# Patient Record
Sex: FEMALE | Race: WHITE | NOT HISPANIC OR LATINO | Employment: UNEMPLOYED | URBAN - METROPOLITAN AREA
[De-identification: names, ages, dates, MRNs, and addresses within clinical notes are randomized per-mention and may not be internally consistent; named-entity substitution may affect disease eponyms.]

---

## 2017-02-17 ENCOUNTER — HISTORICAL (OUTPATIENT)
Dept: RADIOLOGY | Facility: HOSPITAL | Age: 57
End: 2017-02-17

## 2017-02-27 ENCOUNTER — HISTORICAL (OUTPATIENT)
Dept: ADMINISTRATIVE | Facility: HOSPITAL | Age: 57
End: 2017-02-27

## 2017-03-31 ENCOUNTER — HISTORICAL (OUTPATIENT)
Dept: RADIOLOGY | Facility: HOSPITAL | Age: 57
End: 2017-03-31

## 2017-07-26 ENCOUNTER — HISTORICAL (OUTPATIENT)
Dept: ADMINISTRATIVE | Facility: HOSPITAL | Age: 57
End: 2017-07-26

## 2017-07-26 LAB
ABS NEUT (OLG): 2.44 X10(3)/MCL (ref 2.1–9.2)
ALBUMIN SERPL-MCNC: 3.7 GM/DL (ref 3.4–5)
ALBUMIN/GLOB SERPL: 1.3 {RATIO}
ALP SERPL-CCNC: 120 UNIT/L (ref 38–126)
ALT SERPL-CCNC: 26 UNIT/L (ref 12–78)
AST SERPL-CCNC: 18 UNIT/L (ref 15–37)
BASOPHILS # BLD AUTO: 0 X10(3)/MCL (ref 0–0.2)
BASOPHILS NFR BLD AUTO: 1 %
BILIRUB SERPL-MCNC: 0.6 MG/DL (ref 0.2–1)
BILIRUBIN DIRECT+TOT PNL SERPL-MCNC: 0.1 MG/DL (ref 0–0.2)
BILIRUBIN DIRECT+TOT PNL SERPL-MCNC: 0.5 MG/DL (ref 0–0.8)
BUN SERPL-MCNC: 21 MG/DL (ref 7–18)
CALCIUM SERPL-MCNC: 9.2 MG/DL (ref 8.5–10.1)
CHLORIDE SERPL-SCNC: 101 MMOL/L (ref 98–107)
CO2 SERPL-SCNC: 31 MMOL/L (ref 21–32)
CREAT SERPL-MCNC: 1.01 MG/DL (ref 0.55–1.02)
EOSINOPHIL # BLD AUTO: 0.4 X10(3)/MCL (ref 0–0.9)
EOSINOPHIL NFR BLD AUTO: 9 %
ERYTHROCYTE [DISTWIDTH] IN BLOOD BY AUTOMATED COUNT: 12.7 % (ref 11.5–17)
EST. AVERAGE GLUCOSE BLD GHB EST-MCNC: 154 MG/DL
GLOBULIN SER-MCNC: 2.9 GM/DL (ref 2.4–3.5)
GLUCOSE SERPL-MCNC: 107 MG/DL (ref 74–106)
HBA1C MFR BLD: 7 % (ref 4.2–6.3)
HCT VFR BLD AUTO: 40.3 % (ref 37–47)
HGB BLD-MCNC: 13.2 GM/DL (ref 12–16)
IRON SATN MFR SERPL: 19.3 % (ref 20–50)
IRON SERPL-MCNC: 75 MCG/DL (ref 50–175)
LYMPHOCYTES # BLD AUTO: 1.4 X10(3)/MCL (ref 0.6–4.6)
LYMPHOCYTES NFR BLD AUTO: 28 %
MCH RBC QN AUTO: 29.9 PG (ref 27–31)
MCHC RBC AUTO-ENTMCNC: 32.8 GM/DL (ref 33–36)
MCV RBC AUTO: 91.4 FL (ref 80–94)
MONOCYTES # BLD AUTO: 0.5 X10(3)/MCL (ref 0.1–1.3)
MONOCYTES NFR BLD AUTO: 10 %
NEUTROPHILS # BLD AUTO: 2.44 X10(3)/MCL (ref 2.1–9.2)
NEUTROPHILS NFR BLD AUTO: 51 %
PLATELET # BLD AUTO: 226 X10(3)/MCL (ref 130–400)
PMV BLD AUTO: 9.4 FL (ref 9.4–12.4)
POTASSIUM SERPL-SCNC: 3.4 MMOL/L (ref 3.5–5.1)
PROT SERPL-MCNC: 6.6 GM/DL (ref 6.4–8.2)
RBC # BLD AUTO: 4.41 X10(6)/MCL (ref 4.2–5.4)
SODIUM SERPL-SCNC: 140 MMOL/L (ref 136–145)
T3FREE SERPL-MCNC: 3.18 PG/ML (ref 2.18–3.98)
T4 FREE SERPL-MCNC: 0.94 NG/DL (ref 0.76–1.46)
TIBC SERPL-MCNC: 388 MCG/DL (ref 250–450)
TRANSFERRIN SERPL-MCNC: 323 MG/DL (ref 200–360)
TSH SERPL-ACNC: 0.71 MIU/ML (ref 0.36–3.74)
WBC # SPEC AUTO: 4.8 X10(3)/MCL (ref 4.5–11.5)

## 2017-07-27 ENCOUNTER — HISTORICAL (OUTPATIENT)
Dept: ADMINISTRATIVE | Facility: HOSPITAL | Age: 57
End: 2017-07-27

## 2017-10-17 ENCOUNTER — HISTORICAL (OUTPATIENT)
Dept: LAB | Facility: HOSPITAL | Age: 57
End: 2017-10-17

## 2017-12-14 ENCOUNTER — HISTORICAL (OUTPATIENT)
Dept: LAB | Facility: HOSPITAL | Age: 57
End: 2017-12-14

## 2017-12-14 LAB
ABS NEUT (OLG): 3.61 X10(3)/MCL (ref 2.1–9.2)
ALBUMIN SERPL-MCNC: 3.6 GM/DL (ref 3.4–5)
ALBUMIN/GLOB SERPL: 1.1 RATIO (ref 1.1–2)
ALP SERPL-CCNC: 119 UNIT/L (ref 38–126)
ALT SERPL-CCNC: 38 UNIT/L (ref 12–78)
APPEARANCE, UA: CLEAR
AST SERPL-CCNC: 35 UNIT/L (ref 15–37)
BACTERIA SPEC CULT: ABNORMAL /HPF
BASOPHILS # BLD AUTO: 0 X10(3)/MCL (ref 0–0.2)
BASOPHILS NFR BLD AUTO: 1 %
BILIRUB SERPL-MCNC: 0.5 MG/DL (ref 0.2–1)
BILIRUB UR QL STRIP: NEGATIVE
BILIRUBIN DIRECT+TOT PNL SERPL-MCNC: 0.1 MG/DL (ref 0–0.5)
BILIRUBIN DIRECT+TOT PNL SERPL-MCNC: 0.4 MG/DL (ref 0–0.8)
BUN SERPL-MCNC: 20 MG/DL (ref 7–18)
CALCIUM SERPL-MCNC: 8.7 MG/DL (ref 8.5–10.1)
CHLORIDE SERPL-SCNC: 102 MMOL/L (ref 98–107)
CO2 SERPL-SCNC: 31 MMOL/L (ref 21–32)
COLOR UR: YELLOW
CREAT SERPL-MCNC: 1 MG/DL (ref 0.55–1.02)
EOSINOPHIL # BLD AUTO: 0.6 X10(3)/MCL (ref 0–0.9)
EOSINOPHIL NFR BLD AUTO: 10 %
ERYTHROCYTE [DISTWIDTH] IN BLOOD BY AUTOMATED COUNT: 12.4 % (ref 11.5–17)
EST. AVERAGE GLUCOSE BLD GHB EST-MCNC: 157 MG/DL
GLOBULIN SER-MCNC: 3.3 GM/DL (ref 2.4–3.5)
GLUCOSE (UA): ABNORMAL
GLUCOSE SERPL-MCNC: 259 MG/DL (ref 74–106)
HBA1C MFR BLD: 7.1 % (ref 4.2–6.3)
HCT VFR BLD AUTO: 41.6 % (ref 37–47)
HGB BLD-MCNC: 13.5 GM/DL (ref 12–16)
HGB UR QL STRIP: NEGATIVE
KETONES UR QL STRIP: NEGATIVE
LEUKOCYTE ESTERASE UR QL STRIP: NEGATIVE
LYMPHOCYTES # BLD AUTO: 1.4 X10(3)/MCL (ref 0.6–4.6)
LYMPHOCYTES NFR BLD AUTO: 23 %
MCH RBC QN AUTO: 29.8 PG (ref 27–31)
MCHC RBC AUTO-ENTMCNC: 32.5 GM/DL (ref 33–36)
MCV RBC AUTO: 91.8 FL (ref 80–94)
MONOCYTES # BLD AUTO: 0.4 X10(3)/MCL (ref 0.1–1.3)
MONOCYTES NFR BLD AUTO: 6 %
NEUTROPHILS # BLD AUTO: 3.61 X10(3)/MCL (ref 1.4–7.9)
NEUTROPHILS NFR BLD AUTO: 60 %
NITRITE UR QL STRIP: NEGATIVE
PH UR STRIP: 6 [PH] (ref 5–9)
PLATELET # BLD AUTO: 233 X10(3)/MCL (ref 130–400)
PMV BLD AUTO: 9.7 FL (ref 9.4–12.4)
POTASSIUM SERPL-SCNC: 4 MMOL/L (ref 3.5–5.1)
PROT SERPL-MCNC: 6.9 GM/DL (ref 6.4–8.2)
PROT UR QL STRIP: NEGATIVE
RBC # BLD AUTO: 4.53 X10(6)/MCL (ref 4.2–5.4)
RBC #/AREA URNS HPF: ABNORMAL /[HPF]
SODIUM SERPL-SCNC: 139 MMOL/L (ref 136–145)
SP GR UR STRIP: 1.02 (ref 1–1.03)
SQUAMOUS EPITHELIAL, UA: ABNORMAL
TSH SERPL-ACNC: 0.4 MIU/ML (ref 0.36–3.74)
UROBILINOGEN UR STRIP-ACNC: 0.2
WBC # SPEC AUTO: 6 X10(3)/MCL (ref 4.5–11.5)
WBC #/AREA URNS HPF: ABNORMAL /[HPF]

## 2017-12-15 ENCOUNTER — HISTORICAL (OUTPATIENT)
Dept: LAB | Facility: HOSPITAL | Age: 57
End: 2017-12-15

## 2018-06-18 ENCOUNTER — HISTORICAL (OUTPATIENT)
Dept: ADMINISTRATIVE | Facility: HOSPITAL | Age: 58
End: 2018-06-18

## 2018-06-18 LAB
ABS NEUT (OLG): 2.72 X10(3)/MCL (ref 2.1–9.2)
ALBUMIN SERPL-MCNC: 3.4 GM/DL (ref 3.4–5)
ALBUMIN/GLOB SERPL: 1.2 {RATIO}
ALP SERPL-CCNC: 111 UNIT/L (ref 38–126)
ALT SERPL-CCNC: 24 UNIT/L (ref 12–78)
AST SERPL-CCNC: 15 UNIT/L (ref 15–37)
BASOPHILS # BLD AUTO: 0 X10(3)/MCL (ref 0–0.2)
BASOPHILS NFR BLD AUTO: 1 %
BILIRUB SERPL-MCNC: 0.5 MG/DL (ref 0.2–1)
BILIRUBIN DIRECT+TOT PNL SERPL-MCNC: 0.1 MG/DL (ref 0–0.2)
BILIRUBIN DIRECT+TOT PNL SERPL-MCNC: 0.4 MG/DL (ref 0–0.8)
BUN SERPL-MCNC: 12 MG/DL (ref 7–18)
CALCIUM SERPL-MCNC: 9 MG/DL (ref 8.5–10.1)
CHLORIDE SERPL-SCNC: 102 MMOL/L (ref 98–107)
CHOLEST SERPL-MCNC: 197 MG/DL (ref 0–200)
CHOLEST/HDLC SERPL: 3.4 {RATIO} (ref 0–4)
CO2 SERPL-SCNC: 31 MMOL/L (ref 21–32)
CREAT SERPL-MCNC: 0.89 MG/DL (ref 0.55–1.02)
CREAT UR-MCNC: 349 MG/DL
ERYTHROCYTE [DISTWIDTH] IN BLOOD BY AUTOMATED COUNT: 12.4 % (ref 11.5–17)
EST. AVERAGE GLUCOSE BLD GHB EST-MCNC: 134 MG/DL
GLOBULIN SER-MCNC: 2.9 GM/DL (ref 2.4–3.5)
GLUCOSE SERPL-MCNC: 210 MG/DL (ref 74–106)
HAV IGM SERPL QL IA: NEGATIVE
HBA1C MFR BLD: 6.3 % (ref 4.2–6.3)
HBV CORE IGM SERPL QL IA: NEGATIVE
HBV SURFACE AG SERPL QL IA: NEGATIVE
HCT VFR BLD AUTO: 40.2 % (ref 37–47)
HCV AB SERPL QL IA: NEGATIVE
HDLC SERPL-MCNC: 58 MG/DL (ref 35–60)
HEPATITIS PANEL INTERP: NORMAL
HGB BLD-MCNC: 13.5 GM/DL (ref 12–16)
LDLC SERPL CALC-MCNC: 112 MG/DL (ref 0–129)
LYMPHOCYTES # BLD AUTO: 2.1 X10(3)/MCL (ref 0.6–4.6)
LYMPHOCYTES NFR BLD AUTO: 39 %
MCH RBC QN AUTO: 31 PG (ref 27–31)
MCHC RBC AUTO-ENTMCNC: 33.6 GM/DL (ref 33–36)
MCV RBC AUTO: 92.4 FL (ref 80–94)
MICROALBUMIN UR-MCNC: 1.6 MG/DL
MICROALBUMIN/CREAT RATIO PNL UR: 4.6 MG/GM CR (ref 0–30)
MONOCYTES # BLD AUTO: 0.5 X10(3)/MCL (ref 0.1–1.3)
MONOCYTES NFR BLD AUTO: 9 %
NEUTROPHILS # BLD AUTO: 2.72 X10(3)/MCL (ref 1.4–7.9)
NEUTROPHILS NFR BLD AUTO: 51 %
PLATELET # BLD AUTO: 214 X10(3)/MCL (ref 130–400)
PMV BLD AUTO: 9.5 FL (ref 9.4–12.4)
POTASSIUM SERPL-SCNC: 4.1 MMOL/L (ref 3.5–5.1)
PROT SERPL-MCNC: 6.3 GM/DL (ref 6.4–8.2)
RBC # BLD AUTO: 4.35 X10(6)/MCL (ref 4.2–5.4)
SODIUM SERPL-SCNC: 142 MMOL/L (ref 136–145)
TRIGL SERPL-MCNC: 137 MG/DL (ref 30–150)
VLDLC SERPL CALC-MCNC: 27 MG/DL
WBC # SPEC AUTO: 5.3 X10(3)/MCL (ref 4.5–11.5)

## 2018-06-25 ENCOUNTER — HISTORICAL (OUTPATIENT)
Dept: ADMINISTRATIVE | Facility: HOSPITAL | Age: 58
End: 2018-06-25

## 2018-08-02 ENCOUNTER — HISTORICAL (OUTPATIENT)
Dept: ADMINISTRATIVE | Facility: HOSPITAL | Age: 58
End: 2018-08-02

## 2018-08-02 LAB
T4 FREE SERPL-MCNC: 0.85 NG/DL (ref 0.76–1.46)
TSH SERPL-ACNC: 0.64 MIU/L (ref 0.36–3.74)

## 2018-12-21 LAB — CRC RECOMMENDATION EXT: NORMAL

## 2019-01-21 LAB — BCS RECOMMENDATION EXT: NORMAL

## 2019-02-25 ENCOUNTER — HISTORICAL (OUTPATIENT)
Dept: ADMINISTRATIVE | Facility: HOSPITAL | Age: 59
End: 2019-02-25

## 2019-02-25 LAB
ALBUMIN SERPL-MCNC: 3.2 GM/DL (ref 3.4–5)
ALBUMIN/GLOB SERPL: 1 {RATIO}
ALP SERPL-CCNC: 128 UNIT/L (ref 38–126)
ALT SERPL-CCNC: 36 UNIT/L (ref 12–78)
AST SERPL-CCNC: 21 UNIT/L (ref 15–37)
BILIRUB SERPL-MCNC: 0.2 MG/DL (ref 0.2–1)
BILIRUBIN DIRECT+TOT PNL SERPL-MCNC: 0.1 MG/DL (ref 0–0.2)
BILIRUBIN DIRECT+TOT PNL SERPL-MCNC: 0.1 MG/DL (ref 0–0.8)
BUN SERPL-MCNC: 12 MG/DL (ref 7–18)
CALCIUM SERPL-MCNC: 8.9 MG/DL (ref 8.5–10.1)
CHLORIDE SERPL-SCNC: 100 MMOL/L (ref 98–107)
CO2 SERPL-SCNC: 31 MMOL/L (ref 21–32)
CREAT SERPL-MCNC: 0.99 MG/DL (ref 0.55–1.02)
CREAT UR-MCNC: 235 MG/DL
EST. AVERAGE GLUCOSE BLD GHB EST-MCNC: 169 MG/DL
GLOBULIN SER-MCNC: 3.2 GM/DL (ref 2.4–3.5)
GLUCOSE SERPL-MCNC: 284 MG/DL (ref 74–106)
HBA1C MFR BLD: 7.5 % (ref 4.2–6.3)
MICROALBUMIN UR-MCNC: 1.7 MG/DL
MICROALBUMIN/CREAT RATIO PNL UR: 7.2 MG/GM CR (ref 0–30)
POTASSIUM SERPL-SCNC: 4.7 MMOL/L (ref 3.5–5.1)
PROT SERPL-MCNC: 6.4 GM/DL (ref 6.4–8.2)
SODIUM SERPL-SCNC: 137 MMOL/L (ref 136–145)

## 2019-03-07 ENCOUNTER — HISTORICAL (OUTPATIENT)
Dept: ANESTHESIOLOGY | Facility: HOSPITAL | Age: 59
End: 2019-03-07

## 2019-05-15 ENCOUNTER — HISTORICAL (OUTPATIENT)
Dept: CARDIOLOGY | Facility: HOSPITAL | Age: 59
End: 2019-05-15

## 2019-07-02 ENCOUNTER — HISTORICAL (OUTPATIENT)
Dept: ADMINISTRATIVE | Facility: HOSPITAL | Age: 59
End: 2019-07-02

## 2019-07-02 LAB
BUN SERPL-MCNC: 17 MG/DL (ref 7–18)
CALCIUM SERPL-MCNC: 8.6 MG/DL (ref 8.5–10.1)
CHLORIDE SERPL-SCNC: 101 MMOL/L (ref 98–107)
CO2 SERPL-SCNC: 30 MMOL/L (ref 21–32)
CREAT SERPL-MCNC: 1.07 MG/DL (ref 0.55–1.02)
CREAT/UREA NIT SERPL: 15.9
GLUCOSE SERPL-MCNC: 322 MG/DL (ref 74–106)
POTASSIUM SERPL-SCNC: 4.8 MMOL/L (ref 3.5–5.1)
SODIUM SERPL-SCNC: 138 MMOL/L (ref 136–145)

## 2019-09-12 ENCOUNTER — HISTORICAL (OUTPATIENT)
Dept: LAB | Facility: HOSPITAL | Age: 59
End: 2019-09-12

## 2019-09-12 LAB
EST. AVERAGE GLUCOSE BLD GHB EST-MCNC: 151 MG/DL
HBA1C MFR BLD: 6.9 % (ref 4.2–6.3)

## 2020-01-15 ENCOUNTER — HISTORICAL (OUTPATIENT)
Dept: ADMINISTRATIVE | Facility: HOSPITAL | Age: 60
End: 2020-01-15

## 2020-01-15 LAB
ABS NEUT (OLG): 3.2 X10(3)/MCL (ref 2.1–9.2)
ALBUMIN SERPL-MCNC: 3.8 GM/DL (ref 3.4–5)
ALBUMIN/GLOB SERPL: 1.2 {RATIO}
ALP SERPL-CCNC: 122 UNIT/L (ref 38–126)
ALT SERPL-CCNC: 29 UNIT/L (ref 12–78)
AST SERPL-CCNC: 19 UNIT/L (ref 15–37)
BASOPHILS # BLD AUTO: 0.1 X10(3)/MCL (ref 0–0.2)
BASOPHILS NFR BLD AUTO: 1 %
BILIRUB SERPL-MCNC: 0.6 MG/DL (ref 0.2–1)
BILIRUBIN DIRECT+TOT PNL SERPL-MCNC: 0.1 MG/DL (ref 0–0.2)
BILIRUBIN DIRECT+TOT PNL SERPL-MCNC: 0.5 MG/DL (ref 0–0.8)
BUN SERPL-MCNC: 17 MG/DL (ref 7–18)
CALCIUM SERPL-MCNC: 9.5 MG/DL (ref 8.5–10.1)
CHLORIDE SERPL-SCNC: 103 MMOL/L (ref 98–107)
CHOLEST SERPL-MCNC: 195 MG/DL (ref 0–200)
CHOLEST/HDLC SERPL: 3.4 {RATIO} (ref 0–4)
CO2 SERPL-SCNC: 29 MMOL/L (ref 21–32)
CREAT SERPL-MCNC: 1.04 MG/DL (ref 0.55–1.02)
EOSINOPHIL # BLD AUTO: 0.3 X10(3)/MCL (ref 0–0.9)
EOSINOPHIL NFR BLD AUTO: 5 %
ERYTHROCYTE [DISTWIDTH] IN BLOOD BY AUTOMATED COUNT: 12.7 % (ref 11.5–17)
EST. AVERAGE GLUCOSE BLD GHB EST-MCNC: 148 MG/DL
GLOBULIN SER-MCNC: 3.1 GM/DL (ref 2.4–3.5)
GLUCOSE SERPL-MCNC: 91 MG/DL (ref 74–106)
HBA1C MFR BLD: 6.8 % (ref 4.2–6.3)
HCT VFR BLD AUTO: 42.6 % (ref 37–47)
HDLC SERPL-MCNC: 58 MG/DL (ref 35–60)
HGB BLD-MCNC: 13.8 GM/DL (ref 12–16)
LDLC SERPL CALC-MCNC: 114 MG/DL (ref 0–129)
LYMPHOCYTES # BLD AUTO: 2.4 X10(3)/MCL (ref 0.6–4.6)
LYMPHOCYTES NFR BLD AUTO: 36 %
MCH RBC QN AUTO: 29.9 PG (ref 27–31)
MCHC RBC AUTO-ENTMCNC: 32.4 GM/DL (ref 33–36)
MCV RBC AUTO: 92.2 FL (ref 80–94)
MONOCYTES # BLD AUTO: 0.6 X10(3)/MCL (ref 0.1–1.3)
MONOCYTES NFR BLD AUTO: 9 %
NEUTROPHILS # BLD AUTO: 3.2 X10(3)/MCL (ref 2.1–9.2)
NEUTROPHILS NFR BLD AUTO: 48 %
PLATELET # BLD AUTO: 287 X10(3)/MCL (ref 130–400)
PMV BLD AUTO: 9.1 FL (ref 9.4–12.4)
POTASSIUM SERPL-SCNC: 3.7 MMOL/L (ref 3.5–5.1)
PROT SERPL-MCNC: 6.9 GM/DL (ref 6.4–8.2)
RBC # BLD AUTO: 4.62 X10(6)/MCL (ref 4.2–5.4)
SODIUM SERPL-SCNC: 141 MMOL/L (ref 136–145)
TRIGL SERPL-MCNC: 117 MG/DL (ref 30–150)
VLDLC SERPL CALC-MCNC: 23 MG/DL
WBC # SPEC AUTO: 6.6 X10(3)/MCL (ref 4.5–11.5)

## 2020-02-03 ENCOUNTER — HISTORICAL (OUTPATIENT)
Dept: RADIOLOGY | Facility: HOSPITAL | Age: 60
End: 2020-02-03

## 2020-02-27 LAB
ALBUMIN SERPL-MCNC: 3.6 GM/DL (ref 3.4–5)
ALBUMIN/GLOB SERPL: 1.1 {RATIO}
ALP SERPL-CCNC: 104 UNIT/L (ref 38–126)
ALT SERPL-CCNC: 26 UNIT/L (ref 12–78)
AST SERPL-CCNC: 24 UNIT/L (ref 15–37)
BILIRUB SERPL-MCNC: 0.4 MG/DL (ref 0.2–1)
BILIRUBIN DIRECT+TOT PNL SERPL-MCNC: 0.1 MG/DL (ref 0–0.2)
BILIRUBIN DIRECT+TOT PNL SERPL-MCNC: 0.3 MG/DL (ref 0–0.8)
BUN SERPL-MCNC: 24 MG/DL (ref 7–18)
CALCIUM SERPL-MCNC: 9.2 MG/DL (ref 8.5–10.1)
CHLORIDE SERPL-SCNC: 103 MMOL/L (ref 98–107)
CO2 SERPL-SCNC: 32 MMOL/L (ref 21–32)
CREAT SERPL-MCNC: 1.02 MG/DL (ref 0.55–1.02)
ERYTHROCYTE [DISTWIDTH] IN BLOOD BY AUTOMATED COUNT: 12.5 % (ref 11.5–17)
GLOBULIN SER-MCNC: 3.2 GM/DL (ref 2.4–3.5)
GLUCOSE SERPL-MCNC: 133 MG/DL (ref 74–106)
HCT VFR BLD AUTO: 41 % (ref 37–47)
HGB BLD-MCNC: 13.1 GM/DL (ref 12–16)
MCH RBC QN AUTO: 29.6 PG (ref 27–31)
MCHC RBC AUTO-ENTMCNC: 32 GM/DL (ref 33–36)
MCV RBC AUTO: 92.6 FL (ref 80–94)
PLATELET # BLD AUTO: 269 X10(3)/MCL (ref 130–400)
PMV BLD AUTO: 9.7 FL (ref 9.4–12.4)
POTASSIUM SERPL-SCNC: 4.1 MMOL/L (ref 3.5–5.1)
PROT SERPL-MCNC: 6.8 GM/DL (ref 6.4–8.2)
RBC # BLD AUTO: 4.43 X10(6)/MCL (ref 4.2–5.4)
SODIUM SERPL-SCNC: 141 MMOL/L (ref 136–145)
WBC # SPEC AUTO: 5.6 X10(3)/MCL (ref 4.5–11.5)

## 2020-03-03 ENCOUNTER — HISTORICAL (OUTPATIENT)
Dept: ADMINISTRATIVE | Facility: HOSPITAL | Age: 60
End: 2020-03-03

## 2020-05-07 ENCOUNTER — HISTORICAL (OUTPATIENT)
Dept: LAB | Facility: HOSPITAL | Age: 60
End: 2020-05-07

## 2020-05-07 LAB
APPEARANCE, UA: CLEAR
BACTERIA SPEC CULT: NORMAL /HPF
BILIRUB UR QL STRIP: NEGATIVE
COLOR UR: YELLOW
GLUCOSE (UA): NEGATIVE
HGB UR QL STRIP: NEGATIVE
KETONES UR QL STRIP: NEGATIVE
LEUKOCYTE ESTERASE UR QL STRIP: NEGATIVE
NITRITE UR QL STRIP: NEGATIVE
PH UR STRIP: 5 [PH] (ref 5–9)
PROT UR QL STRIP: NEGATIVE
RBC #/AREA URNS HPF: NORMAL /[HPF]
SP GR UR STRIP: 1.01 (ref 1–1.03)
SQUAMOUS EPITHELIAL, UA: NORMAL
UROBILINOGEN UR STRIP-ACNC: 0.2
WBC #/AREA URNS HPF: NORMAL /[HPF]

## 2020-05-11 ENCOUNTER — HISTORICAL (OUTPATIENT)
Dept: ADMINISTRATIVE | Facility: HOSPITAL | Age: 60
End: 2020-05-11

## 2020-05-11 LAB
ALBUMIN SERPL-MCNC: 3.7 GM/DL (ref 3.5–5)
ALBUMIN/GLOB SERPL: 1.4 RATIO (ref 1.1–2)
ALP SERPL-CCNC: 95 UNIT/L (ref 40–150)
ALT SERPL-CCNC: 17 UNIT/L (ref 0–55)
AST SERPL-CCNC: 22 UNIT/L (ref 5–34)
BILIRUB SERPL-MCNC: 0.2 MG/DL
BILIRUBIN DIRECT+TOT PNL SERPL-MCNC: 0.1 MG/DL (ref 0–0.5)
BILIRUBIN DIRECT+TOT PNL SERPL-MCNC: 0.1 MG/DL (ref 0–0.8)
BUN SERPL-MCNC: 15.8 MG/DL (ref 9.8–20.1)
CALCIUM SERPL-MCNC: 9.2 MG/DL (ref 8.4–10.2)
CHLORIDE SERPL-SCNC: 103 MMOL/L (ref 98–107)
CO2 SERPL-SCNC: 29 MMOL/L (ref 22–29)
CREAT SERPL-MCNC: 0.93 MG/DL (ref 0.55–1.02)
ERYTHROCYTE [DISTWIDTH] IN BLOOD BY AUTOMATED COUNT: 12.3 % (ref 11.5–17)
EST. AVERAGE GLUCOSE BLD GHB EST-MCNC: 139.8 MG/DL
GLOBULIN SER-MCNC: 2.7 GM/DL (ref 2.4–3.5)
GLUCOSE SERPL-MCNC: 63 MG/DL (ref 74–100)
HBA1C MFR BLD: 6.5 %
HCT VFR BLD AUTO: 39.2 % (ref 37–47)
HGB BLD-MCNC: 12.6 GM/DL (ref 12–16)
MCH RBC QN AUTO: 30.2 PG (ref 27–31)
MCHC RBC AUTO-ENTMCNC: 32.1 GM/DL (ref 33–36)
MCV RBC AUTO: 94 FL (ref 80–94)
PLATELET # BLD AUTO: 244 X10(3)/MCL (ref 130–400)
PMV BLD AUTO: 9.3 FL (ref 9.4–12.4)
POTASSIUM SERPL-SCNC: 3.8 MMOL/L (ref 3.5–5.1)
PROT SERPL-MCNC: 6.4 GM/DL (ref 6.4–8.3)
RBC # BLD AUTO: 4.17 X10(6)/MCL (ref 4.2–5.4)
SODIUM SERPL-SCNC: 141 MMOL/L (ref 136–145)
WBC # SPEC AUTO: 5.9 X10(3)/MCL (ref 4.5–11.5)

## 2020-05-29 ENCOUNTER — HISTORICAL (OUTPATIENT)
Dept: ADMINISTRATIVE | Facility: HOSPITAL | Age: 60
End: 2020-05-29

## 2021-03-29 ENCOUNTER — HISTORICAL (OUTPATIENT)
Dept: ADMINISTRATIVE | Facility: HOSPITAL | Age: 61
End: 2021-03-29

## 2021-06-18 ENCOUNTER — HISTORICAL (OUTPATIENT)
Dept: RADIOLOGY | Facility: HOSPITAL | Age: 61
End: 2021-06-18

## 2021-08-05 ENCOUNTER — HISTORICAL (OUTPATIENT)
Dept: ADMINISTRATIVE | Facility: HOSPITAL | Age: 61
End: 2021-08-05

## 2021-08-05 LAB — SARS-COV-2 RNA RESP QL NAA+PROBE: NEGATIVE

## 2021-11-26 LAB
INFLUENZA A ANTIGEN, POC: NEGATIVE
INFLUENZA B ANTIGEN, POC: NEGATIVE
RAPID GROUP A STREP (OHS): NEGATIVE
SARS-COV-2 RNA RESP QL NAA+PROBE: NEGATIVE

## 2021-12-14 LAB — PAP RECOMMENDATION EXT: NORMAL

## 2022-01-22 LAB
INFLUENZA A ANTIGEN, POC: NEGATIVE
INFLUENZA B ANTIGEN, POC: NEGATIVE
RAPID GROUP A STREP (OHS): NEGATIVE
SARS-COV-2 RNA RESP QL NAA+PROBE: POSITIVE

## 2022-04-10 ENCOUNTER — HISTORICAL (OUTPATIENT)
Dept: ADMINISTRATIVE | Facility: HOSPITAL | Age: 62
End: 2022-04-10
Payer: COMMERCIAL

## 2022-04-27 VITALS
WEIGHT: 180.75 LBS | SYSTOLIC BLOOD PRESSURE: 110 MMHG | HEIGHT: 67 IN | BODY MASS INDEX: 28.37 KG/M2 | OXYGEN SATURATION: 98 % | DIASTOLIC BLOOD PRESSURE: 68 MMHG

## 2022-04-30 NOTE — H&P
"   Patient:   Beth Chaudhary            MRN: [id number]            FIN: [id number]               Age:   59 years     Sex:  Female     :  1960   Associated Diagnoses:   None   Author:   Yao Singletary MD      Chief Complaint     "I have a hernia"      History of Present Illness     60 yo female with history of multiple abdominal surgeries who states her hernia is continuing to worsen.  Tolerating a regular diet, with normal bowel movements.  Denies any fever / chills.        Review of Systems   Constitutional:  Negative.    Eye:  Negative.    Ear/Nose/Mouth/Throat:  Negative.    Respiratory:  Negative.    Cardiovascular:  Negative.    Gastrointestinal:  Negative.    Genitourinary:  Negative.    Hematology/Lymphatics:  Negative.    Endocrine:  Negative.    Immunologic:  Negative.    Musculoskeletal:  Negative.    Integumentary:  Negative.    Neurologic:  Negative.    Psychiatric:  Negative.    All other systems are negative      Health Status   Allergies:    Allergic Reactions (Selected)  Severity Not Documented  Codeine- No reactions were documented.  Gabapentin- No reactions were documented.  Morphine- Morphine.  Sulfa drugs- No reactions were documented.   Current medications:  (Selected)   Inpatient Medications  Ordered  Ancef: 2 gm, form: Infusion, IV Piggyback, On Call, Infuse over: 1 hr, Order duration: 1 dose(s), first dose 20 6:02:00 CST  Prescriptions  Prescribed  Altace 10 mg oral capsule: 10 mg = 1 cap(s), Oral, Daily, # 30 cap(s), 6 Refill(s), Pharmacy: [facility] [id number]  Benicar 5 mg oral tablet: 10 mg = 2 tab(s), Oral, BID, # 360 tab(s), 3 Refill(s), Pharmacy: "[email or web address] #[id number]  Bentyl 10 mg oral capsule: 10 mg = 1 cap(s), Oral, QID, # 56 cap(s), 0 Refill(s), Pharmacy: [facility] [id number]  Bentyl 10 mg oral capsule: 20 mg = 2 cap(s), Oral, QID, # 56 cap(s), 0 Refill(s), Pharmacy: [facility] [id number]  DuoNeb 0.5 mg-2.5 mg/3 mL inhalation solution: 3 " mL, INH, QID, # 30 EA, 0 Refill(s), Pharmacy: [facility] [id number]  Motrin 800 mg oral tablet: See Instructions, TAKE 1 TABLET BY MOUTH THREE TIMES DAILY, # 90 tab(s), 3 Refill(s), Pharmacy: [facility] #[id number], 169, cm, Height/Length Dosing, 11/13/19 16:58:00 CST, 81.5, kg, Weight Dosing, 11/13/19 16:58:00 CST  Phenergan 25 mg Tab: 25 mg = 1 tab(s), Oral, TID, # 21 tab(s), 0 Refill(s), Pharmacy: [facility] [id number]  ProAir HFA 90 mcg/inh inhalation aerosol with adapter: 2 puff(s), INH, q4hr, PRN PRN for wheezing, # 1 EA, 0 Refill(s), Pharmacy: [facility] [id number]  SYR BD JE U-FINE 0.3ML 10'S 30G 1/2: See Instructions, USE TWICE A DAY, # 200 EA, 3 Refill(s), eRx: [facility]  SYR BD Je U-Fine 0.3ml 10's 30G 1/2: SYR BD Je U-Fine 0.3ml 10's 30G 1/2, See Instructions, use bid  dx: DM, # 200 EA, 3 Refill(s), Pharmacy: [facility]  Seroquel 50 mg oral tablet: 50 mg = 1 tab(s), Oral, At Bedtime, # 30 tab(s), 3 Refill(s), Pharmacy: [facility] #[id number]  Toujeo SoloStar 300 units/mL subcutaneous solution: 10 units, Subcutaneous, Daily, # 4.5 mL, 6 Refill(s), Pharmacy: [facility] #[id number]  acetaminophen-hydrocodone 325 mg-10 mg oral tablet: 1 tab(s), Oral, TID, PRN PRN for pain, Greater than 7 day supply, medically necessary   Fill on 02/02/20, # 90 tab(s), 0 Refill(s), Weight Dosing  carisoprodol 350 mg oral tablet: 350 mg = 1 tab(s), Oral, Daily, # 30 tab(s), 0 Refill(s), Pharmacy: [place], 169, cm, Height/Length Dosing, 01/16/20 15:36:00 CST, 79.83, kg, Weight Dosing, 01/16/20 15:36:00 CST  hydrOXYzine hydrochloride 25 mg oral tablet: See Instructions, TAKE 1 TABLET BY MOUTH EVERY 6 HOURS AS NEEDED FOR ITCHING, # 40 tab(s), 5 Refill(s), eRx: [facility] [id number]  methocarbamol 500 mg oral tablet: 500 mg = 1 tab(s), Oral, BID, # 20 tab(s), 0 Refill(s), Pharmacy: [facility] #[id number], 169, cm,  Height/Length Dosing, 01/16/20 15:36:00 CST, 79.83, kg, Weight Dosing, 01/16/20 15:36:00 CST  one touch mini strips: one touch mini strips, See Instructions, use bid, # 100 EA, 0 Refill(s), Pharmacy: [facility]  traZODONE 50 mg oral tablet ( Desyrel ): 50 mg = 1 tab(s), Oral, Once a day (at bedtime), # 30 tab(s), 3 Refill(s), Pharmacy: [facility] #[id number]  venlafaxine 150 mg oral capsule, extended release: 150 mg = 1 cap(s), Oral, Daily, # 30 cap(s), 3 Refill(s), Pharmacy: [facility] #[id number], 169, cm, Height/Length Dosing, 01/16/20 15:36:00 CST, 79.83, kg, Weight Dosing, 01/16/20 15:36:00 CST  Documented Medications  Documented  ANASTROZOLE 1MG TABLETS: 1 mg = 1 tab(s), Oral, Daily  Co Q-10: 200 mg, Oral, Daily, 0 Refill(s)  Intestinal Movement formula: Intestinal Movement formula, 2 tab(s), Oral, Daily, 0 Refill(s)  LORAzepam 2 mg oral tablet: 1 tab(s) ( 2 mg ), PO, bid, PRN: for anxiety, # 60 tab(s), 0 Refill(s), Type: Maintenance  Misc Prescription: 1,000 mcg =, Oral, Daily, 0 Refill(s)  Movantik 25 mg oral tablet: 25 mg = 1 tab(s), Oral, qAM, 0 Refill(s)  Multiple Vitamins oral tablet: 1 tab(s), PO, Daily, # 30 tab(s), 0 Refill(s), Type: Maintenance  NovoLIN N 100 units/mL subcutaneous suspension: 30 units, Subcutaneous, Daily, See Instructions, Instructions: 30 units QAM 15 units QPM, # 10 mL, 11 Refill(s), Type: Maintenance, Pharmacy: [facility], 30 units QAM; 15 units QPM  Special Instructions: 30 units QAM 15 units QPM, # 10 mL  Raw probiotics: Raw probiotics, 1 cap, Oral, Daily, 0 Refill(s)  Vitamin C 500 mg oral tablet: 1 tab(s) ( 500 mg ), PO, Daily, # 30 tab(s), 0 Refill(s), Type: Maintenance  Zyrtec-D: 1 tab(s), Oral, Daily, 0 Refill(s)  ciprofloxacin 500 mg oral tablet: 500 mg = 1 tab(s), Oral, BID  hydroCHLOROthiazide 12.5 mg oral capsule: 12.5 mg = 1 cap(s), Oral, Daily  magnesium oxide 400 mg oral tablet: 400 mg = 1 tab(s), Oral, Daily, 0  Refill(s)  meloxicam 15 mg oral tablet: 15 mg = 1 tab(s), Oral, Daily  methocarbamol 500 mg oral tablet: 500 mg = 1 tab(s), Oral, TID  skinny shot: skinny shot, qWeek, 0 Refill(s)   Problem list:    All Problems  Acid reflux / SNOMED CT 0047019874 / Confirmed  Bronchial Asthma(  Confirmed  ) / SNOMED CT 16940208 / Confirmed  Back pain, chronic / SNOMED CT [id number] / Confirmed  Benign Essential Hypertension(  Confirmed  ) / SNOMED CT 7991355 / Confirmed  Breast cancer / SNOMED CT 293222783 / Confirmed  Chronic Pain Syndrome(  Confirmed  ) / SNOMED CT 3604948227 / Confirmed  Constipation / ICD-9-.00 / Confirmed  ALEC (Generalized Anxiety Disorder)(  Confirmed  ) / SNOMED CT 92664410 / Confirmed  Sleep apnea / SNOMED CT 390650667 / Confirmed  Diabetes mellitus type 1(  Confirmed  ) / SNOMED CT 383481931 / Confirmed,    Active Problems (10)  Acid reflux   Back pain, chronic   Benign Essential Hypertension(  Confirmed  )   Breast cancer   Bronchial Asthma(  Confirmed  )   Chronic Pain Syndrome(  Confirmed  )   Constipation   Diabetes mellitus type 1(  Confirmed  )   ALEC (Generalized Anxiety Disorder)(  Confirmed  )   Sleep apnea         Histories   Past Medical History:    No active or resolved past medical history items have been selected or recorded.   Family History:    Pancreatic cancer  Father  Heart failure.  Mother     Procedure history:    Colonoscopy (SNOMED CT 031362467) performed by LILIA Burgess MD on 12/21/2018 at 58 Years.  plastic sx.  cholecystectomy.  appendectomy.  T & A.  Skin graft for dehiscence wound abdomen.  lumpectomy.   Social History        Social & Psychosocial Habits    Alcohol  03/31/2015  Use: Never    Employment/School  03/31/2015  Status: Unemployed    Exercise  03/31/2015  Duration (average number of minutes): 30    Times per week: 3-4 times/week    Exercise type: Walking    Home/Environment  03/31/2015  Lives with:  Spouse    Nutrition/Health  03/31/2015  Type of diet: Regular    Sexual  03/31/2015  Sexually active: Yes    Substance Use  02/20/2018  Use: Never    Tobacco  03/31/2015 Risk Assessment: Denies Tobacco Use    11/13/2019  Use: Former smoker, quit more    Patient Wants Consult For Cessation Counseling N/A    03/03/2020  Use: Former smoker, quit more    Patient Wants Consult For Cessation Counseling N/A    Abuse/Neglect  03/03/2020  SHX Any signs of abuse or neglect No    Feels unsafe at home: No    Safe place to go: Yes  .        Physical Examination   General:  Alert and oriented, No acute distress.    Eye:  Pupils are equal, round and reactive to light.    HENT:  Normocephalic.    Neck:  Supple, Non-tender, No carotid bruit, No jugular venous distention.    Respiratory:  Lungs are clear to auscultation, Respirations are non-labored, Breath sounds are equal, Symmetrical chest wall expansion.    Cardiovascular:  Normal rate, Regular rhythm, No murmur.    Gastrointestinal:  Soft, Non-tender, Non-distended, Normal bowel sounds, midling incision well healed, lower midline reducible hernia, nttp.       Vital Signs (last 24 hrs)_____  Last Charted___________  Temp Oral     36.6 DegC  (MAR 03 06:45)  Heart Rate Peripheral   92 bpm  (MAR 03 06:45)  Resp Rate         18 br/min  (MAR 03 06:30)  SBP      L 87mmHg  (MAR 03 06:45)  DBP      L 56mmHg  (MAR 03 06:45)  SpO2      L 92%  (MAR 03 06:45)  Weight      79.8 kg  (MAR 03 06:28)  Height      170.18 cm  (MAR 03 06:28)  BMI      27.55  (MAR 03 06:28)        Review / Management     CT -   IMPRESSION:  1. Midline ventral abdominal wall surgical scar is seen. There appears  to be a right para midline ventral abdominal/lower pelvic wall hernia  to the right of the surgical scar which contains mesenteric fat with  slight involvement of the anterior wall of a portion of the sigmoid  colon. No associated bowel obstruction is seen. No evidence of  associated fluid or edema is seen to  suggest incarceration or  entrapment by CT. Additional small fat-containing midline ventral  abdominal wall hernias are seen along the upper abdomen. There is  background thinning of the ventral abdominal wall, mostly vascular  midline.  2. Additional findings and details as above.      Impression and Plan     58 yo female with ventral hernia    -  PLan for open ventral hernia repair  -  Pre-operative workup

## 2022-04-30 NOTE — OP NOTE
Patient:   Beth Chaudhary            MRN: [id number]            FIN: [id number]               Age:   59 years     Sex:  Female     :  1960   Associated Diagnoses:   None   Author:   Yao Singletary MD          DATE OF SURGERY:    3/3/20    SURGEON:  Yao Singletary MD    PREOPERATIVE DIAGNOSES: 1.  Ventral / Incisional hernia    POSTOPERATIVE DIAGNOSES:  1.  Same    OPERATION:  Primary ventral hernia repair.    ANESTHESIA:  General endotracheal anesthesia.    ESTIMATED BLOOD LOSS:  Minimal.    BLOOD ADMINISTERED:  None.    LAP AND INSTRUMENT COUNTS:  Correct x2 at the end the case.    INDICATIONS AND SIGNIFICANT HISTORY:  The patient is a 59-year-old female who presented with complaints of a ventral hernia from previous surgical manipulation.  Risks, benefits of surgery were discussed with the patient.  Patient voiced understanding of risks and benefits, and elected to proceed with surgery.    DESCRIPTION OF OPERATIVE PROCEDURE:  Once informed consents were obtained, patient was taken to the operating room, placed supine on the operating table.  After general endotracheal anesthesia was induced, the abdomen was prepped and draped in the standard surgical fashion.  An incision made with a scalpel over the midline, upon which the dissection was carried out to the level of the anterior rectus fascia.  After this was dissected to the hernia defect which was dissected around circumferentially exposing the hernia defect.  There was incarcerated omentum which was easily reduced and no bowel contents visualized in the defect.  This was then closed with a 0 Ethibond suture in a figure-of-eight fashion x3.  The wound was then irrigated and dried with no active bleeding noted.  The umbilical stalk was then reapproximated to the abdominal fascia with 3-0 Vicryl suture.  The skin was then closed in 2 layer fashion with 3-0 Vicryl followed by 4-0 Vicryl suture in running subcuticular fashion.  Skin was cleaned and  dried, and a dry sterile pressure dressing was then placed in the wound.

## 2022-05-04 NOTE — HISTORICAL [facility] CERNER
This is a historical note converted from Gracie. Formatting and pictures may have been removed.  Please reference Gracie for original formatting and attached multimedia. Chief Complaint  5 week follow up Left foot fracture here for eval and xrays. Doing good c/o No pain. Stopped boot x2 days. fx 05/21/18 gl. 08/19/18.  History of Present Illness  5/21/2018: Left?tarsal bone fracture, nonoperative treatment  ?  She returns today.? She has been able to successfully wean out of the boot.? She is not having any pain.? She is eager to get back to walking.  Physical Exam  Vitals & Measurements  BP:?112/60?  HT:?169?cm? HT:?169?cm? HT:?169?cm? WT:?78.92?kg? WT:?78.92?kg? WT:?78.92?kg? BMI:?27.63?  Her foot is well aligned. ?The swelling and ecchymosis have resolved. ?She is nontender over her calcaneus,?cuneiform, and navicular. ?She is full range of motion her ankle and foot. ?Distally she is neurovascular intact  Assessment/Plan  Fractured tarsal bone  Doing well status post above.? She has weaned from the boot. ?She can return to?walking activities. ?She is clear for full activities?about 2 weeks. ?I will see her back as needed  Ordered:  Clinic Follow up, 06/25/18 15:06:23 CDT, Fracture of tarsal bone, [facility]  Post-Op follow-up visit [id number] PC, Fractured tarsal bone, [facility], 06/25/18 15:38:00 CDT  XR Foot Left Minimum 3 Views, Routine, 06/25/18 15:21:00 CDT, Fracture, None, Ambulatory, Rad Type, Fractured tarsal bone, Not Scheduled, 06/25/18 15:21:00 CDT  ?  Orders:  Clinic Follow-up PRN, 06/25/18 15:38:00 CDT, Future Order, [facility]   Problem List/Past Medical History  Ongoing  Acid reflux  Back pain, chronic  Benign Essential Hypertension(  Confirmed  )  Breast cancer  Bronchial Asthma(  Confirmed  )  Chronic Pain Syndrome(  Confirmed  )  Constipation  Diabetes mellitus type 1(  Confirmed  )  ALEC (Generalized Anxiety Disorder)(  Confirmed  )  Historical  No qualifying  data  Procedure/Surgical History  Application of short leg splint (calf to foot) (05/17/2018), Immobilization of Left Lower Leg using Splint (05/17/2018), appendectomy, cholecystectomy, plastic sx, Skin graft for dehiscence wound abdomen, T & A.  Medications  acetaminophen-hydrocodone 325 mg-10 mg oral tablet, 1 tab(s), Oral, TID, PRN  ANASTROZOLE 1MG TABLETS, 1 mg= 1 tab(s), Oral, Daily  carisoprodol 350 mg oral tablet, 350 mg= 1 tab(s), Oral, TID  Co Q-10, 200 mg, Oral, Daily  DuoNeb 0.5 mg-2.5 mg/3 mL inhalation solution, 3 mL, INH, QID  Elderberry syrup, Oral, Daily  hydrochlorothiazide 12.5 mg oral capsule  hydrOXYzine hydrochloride 25 mg oral tablet, 25 mg= 1 tab(s), Oral, QID, PRN  LORAzepam 2 mg oral tablet  Misc Prescription, 1000 mcg, Oral, Daily  Movantik 25 mg oral tablet, 25 mg= 1 tab(s), Oral, qAM  Multiple Vitamins oral tablet  NovoLIN N 100 units/mL subcutaneous suspension, 20 units, Subcutaneous, Daily  olmesartan 5 mg oral tablet, 1 tab, Oral, BID, 3 refills  one touch mini strips, See Instructions  ProAir HFA 90 mcg/inh inhalation aerosol with adapter, 2 puff(s), INH, q4hr, PRN  Protonix 40 mg ORAL enteric coated tablet, 40 mg= 1 tab(s), Oral, Daily, 6 refills  Qsymia 3.75 mg-23 mg oral capsule, extended release, 1 cap(s), Oral, qAM, 1 refills  SYR BD Imer U-Fine 0.3ml 10s 30G 1/2, See Instructions, 3 refills  Toujeo SoloStar 300 units/mL subcutaneous solution, 10 units, Subcutaneous, Daily, 6 refills  venlafaxine 150 mg oral capsule, extended release, 150 mg= 1 cap(s), Oral, BID, 1 refills  Vitamin C 500 mg oral tablet  Zyrtec-D, 1 tab(s), Oral, Daily  Allergies  codeine  gabapentin  morphine?(morphine)  sulfa drugs  Social History  Alcohol  Never, 03/31/2015  Employment/School  Unemployed, 03/31/2015  Exercise  Exercise duration: 30. Exercise frequency: 3-4 times/week. Exercise type: Walking., 03/31/2015  Home/Environment  Lives with Spouse., 03/31/2015  Nutrition/Health  Regular,  03/31/2015  Sexual  Sexually active: Yes., 03/31/2015  Substance Abuse  Never, 02/20/2018  Tobacco - Denies Tobacco Use, 03/31/2015  Former smoker Use:., 06/19/2018  Former smoker, Started age 53.0 Years. Stopped age 18 Years., 11/28/2017  Family History  Heart failure.: Mother.  Pancreatic cancer: Father.  Immunizations  Vaccine Date Status   influenza virus vaccine, inactivated 09/22/2017 Recorded   influenza virus vaccine, inactivated 09/02/2017 Recorded   influenza virus vaccine, inactivated 08/26/2016 Recorded   influenza virus vaccine, inactivated 10/27/2015 Recorded   influenza virus vaccine, inactivated 09/23/2014 Recorded   influenza virus vaccine, inactivated 09/16/2014 Recorded   influenza virus vaccine, inactivated 12/09/2013 Recorded   influenza virus vaccine, inactivated 10/18/2010 Recorded   Health Maintenance  Health Maintenance  ???Pending?(in the next year)  ??? ??OverDue  ??? ? ? ?Diabetes Maintenance-Eye Exam due??01/20/17??and every 1??day(s)  ??? ? ? ?Asthma Management-Asthma Education due??06/07/17??and every 6??month(s)  ??? ? ? ?Asthma Management-Spirometry due??12/07/17??Variable frequency  ??? ? ? ?Asthma Management-[place] Flow due??12/07/17??Variable frequency  ??? ? ? ?Tetanus Vaccine due??12/07/17??and every 10??year(s)  ??? ? ? ?Diabetes Maintenance-Foot Exam due??06/15/18??and every 1??year(s)  ??? ??Due?  ??? ? ? ?Cervical Cancer Screening due??06/25/18??and every 5??year(s)  ??? ??Postponed?  ??? ? ? ?Alcohol Misuse Screening due??12/07/18??and every 1??year(s)  ??? ??Refused?  ??? ? ? ?Asthma Management-Written Action Plan due??06/07/17??and every 6??month(s)  ??? ??Due In Future?  ??? ? ? ?Asthma Management-Asthma Medication Prescribed not due until??07/27/18??and every 1??year(s)  ??? ? ? ?Influenza Vaccine not due until??12/07/18??and every 1??year(s)  ??? ? ? ?Aspirin Therapy for CVD Prevention not due until??12/14/18??and every 1??year(s)  ??? ? ? ?Hypertension  Management-Education not due until??12/14/18??and every 1??year(s)  ??? ? ? ?Diabetes Maintenance-Urine Dipstick not due until??12/14/18??and every 1??year(s)  ??? ? ? ?Diabetes Maintenance-HgbA1c not due until??12/18/18??and every 6??month(s)  ??? ? ? ?Hypertension Management-Blood Pressure not due until??12/25/18??and every 6??month(s)  ??? ? ? ?Diabetes Maintenance-Fasting Lipid Profile not due until??06/18/19??and every 1??year(s)  ??? ? ? ?Diabetes Maintenance-Serum Creatinine not due until??06/18/19??and every 1??year(s)  ??? ? ? ?Lipid Screening not due until??06/18/19??and every 1??year(s)  ??? ? ? ?Diabetes Maintenance-Microalbumin not due until??06/18/19??and every 1??year(s)  ??? ? ? ?Hypertension Management-BMP not due until??06/18/19??and every 1??year(s)  ???Satisfied?(in the past 1 year)  ??? ??Satisfied?  ??? ? ? ?Aspirin Therapy for CVD Prevention on??12/14/17.??Satisfied by Adilene Jacobsen LPN??Reason: Expectation Satisfied Elsewhere  ??? ? ? ?Asthma Management-Asthma Medication Prescribed on??07/27/17.??Satisfied by Rachel Schaffer MD  ??? ? ? ?Blood Pressure Screening on??06/25/18.??Satisfied by Sarah Julien L. L.  ??? ? ? ?Body Mass Index Check on??06/25/18.??Satisfied by Sarah Julien L. L.  ??? ? ? ?Depression Screening on??06/25/18.??Satisfied by Anaya Sarah L. L.  ??? ? ? ?Diabetes Maintenance-Microalbumin on??06/18/18.??Satisfied by Yojana Ponce MT  ??? ? ? ?Hypertension Management-Blood Pressure on??06/25/18.??Satisfied by Sarah Julien.  ??? ? ? ?Influenza Vaccine on??09/22/17.??Satisfied by Albertina Kaye LPN.  ??? ? ? ?Lipid Screening on??06/18/18.??Satisfied by Yojana Ponce MT.  ??? ? ? ?Obesity Screening on??06/25/18.??Satisfied by Sarah Julien.  ??? ? ? ?Tobacco Use Screening on??06/25/18.??Satisfied by Sarah Julien.  ?  ?  Diagnostic Results  Foot radiographs 6/25/2018:?3 views of the foot show interval fracture healing

## 2022-06-22 ENCOUNTER — OFFICE VISIT (OUTPATIENT)
Dept: URGENT CARE | Facility: CLINIC | Age: 62
End: 2022-06-22
Payer: COMMERCIAL

## 2022-06-22 DIAGNOSIS — B34.9 VIRAL INFECTION: ICD-10-CM

## 2022-06-22 DIAGNOSIS — J02.9 SORE THROAT: Primary | ICD-10-CM

## 2022-06-22 DIAGNOSIS — L98.9 SKIN LESION: ICD-10-CM

## 2022-06-22 LAB
CTP QC/QA: YES
CTP QC/QA: YES
S PYO RRNA THROAT QL PROBE: NEGATIVE
SARS-COV-2 RDRP RESP QL NAA+PROBE: NEGATIVE

## 2022-06-22 PROCEDURE — U0002: ICD-10-PCS | Mod: QW,,, | Performed by: GENERAL PRACTICE

## 2022-06-22 PROCEDURE — 99213 PR OFFICE/OUTPT VISIT, EST, LEVL III, 20-29 MIN: ICD-10-PCS | Mod: S$PBB,25,, | Performed by: GENERAL PRACTICE

## 2022-06-22 PROCEDURE — U0002 COVID-19 LAB TEST NON-CDC: HCPCS | Mod: QW,,, | Performed by: GENERAL PRACTICE

## 2022-06-22 PROCEDURE — 4010F PR ACE/ARB THEARPY RXD/TAKEN: ICD-10-PCS | Mod: CPTII,,, | Performed by: GENERAL PRACTICE

## 2022-06-22 PROCEDURE — 1159F MED LIST DOCD IN RCRD: CPT | Mod: CPTII,,, | Performed by: GENERAL PRACTICE

## 2022-06-22 PROCEDURE — 1159F PR MEDICATION LIST DOCUMENTED IN MEDICAL RECORD: ICD-10-PCS | Mod: CPTII,,, | Performed by: GENERAL PRACTICE

## 2022-06-22 PROCEDURE — 1160F RVW MEDS BY RX/DR IN RCRD: CPT | Mod: CPTII,,, | Performed by: GENERAL PRACTICE

## 2022-06-22 PROCEDURE — 87880 POCT RAPID STREP A: ICD-10-PCS | Mod: QW,,, | Performed by: GENERAL PRACTICE

## 2022-06-22 PROCEDURE — 1160F PR REVIEW ALL MEDS BY PRESCRIBER/CLIN PHARMACIST DOCUMENTED: ICD-10-PCS | Mod: CPTII,,, | Performed by: GENERAL PRACTICE

## 2022-06-22 PROCEDURE — 87880 STREP A ASSAY W/OPTIC: CPT | Mod: QW,,, | Performed by: GENERAL PRACTICE

## 2022-06-22 PROCEDURE — 99213 OFFICE O/P EST LOW 20 MIN: CPT | Mod: S$PBB,25,, | Performed by: GENERAL PRACTICE

## 2022-06-22 PROCEDURE — 4010F ACE/ARB THERAPY RXD/TAKEN: CPT | Mod: CPTII,,, | Performed by: GENERAL PRACTICE

## 2022-06-22 RX ORDER — PRAVASTATIN SODIUM 40 MG/1
40 TABLET ORAL DAILY
COMMUNITY
Start: 2022-05-17 | End: 2023-11-29 | Stop reason: ALTCHOICE

## 2022-06-22 RX ORDER — VENLAFAXINE HYDROCHLORIDE 150 MG/1
150 CAPSULE, EXTENDED RELEASE ORAL DAILY
COMMUNITY
Start: 2022-01-22 | End: 2023-11-29 | Stop reason: ALTCHOICE

## 2022-06-22 RX ORDER — ALBUTEROL SULFATE 90 UG/1
2 AEROSOL, METERED RESPIRATORY (INHALATION) EVERY 6 HOURS PRN
COMMUNITY
Start: 2022-01-22

## 2022-06-22 RX ORDER — LORAZEPAM 2 MG/1
2 TABLET ORAL EVERY 8 HOURS PRN
COMMUNITY
Start: 2022-05-23

## 2022-06-22 RX ORDER — INSULIN GLARGINE 300 U/ML
INJECTION, SOLUTION SUBCUTANEOUS
COMMUNITY
Start: 2022-06-14 | End: 2023-04-28 | Stop reason: ALTCHOICE

## 2022-06-22 RX ORDER — INSULIN ASPART INJECTION 100 [IU]/ML
INJECTION, SOLUTION SUBCUTANEOUS
COMMUNITY
Start: 2022-06-21 | End: 2023-04-28 | Stop reason: ALTCHOICE

## 2022-06-22 RX ORDER — FLUTICASONE PROPIONATE 50 MCG
SPRAY, SUSPENSION (ML) NASAL
COMMUNITY
Start: 2021-09-27

## 2022-06-22 RX ORDER — VIT C/E/ZN/COPPR/LUTEIN/ZEAXAN 250MG-90MG
2500 CAPSULE ORAL
COMMUNITY

## 2022-06-22 RX ORDER — OLMESARTAN MEDOXOMIL 5 MG/1
10 TABLET ORAL 2 TIMES DAILY
COMMUNITY
Start: 2022-04-18 | End: 2023-11-29 | Stop reason: ALTCHOICE

## 2022-06-22 RX ORDER — HYDROCHLOROTHIAZIDE 12.5 MG/1
12.5 CAPSULE ORAL EVERY MORNING
COMMUNITY
Start: 2022-05-02 | End: 2023-11-29 | Stop reason: ALTCHOICE

## 2022-06-22 RX ORDER — ALPHA LIPOIC ACID 300 MG
1 CAPSULE ORAL EVERY MORNING
COMMUNITY

## 2022-06-22 RX ORDER — MUPIROCIN 20 MG/G
OINTMENT TOPICAL 3 TIMES DAILY
Qty: 15 G | Refills: 3 | Status: SHIPPED | OUTPATIENT
Start: 2022-06-22 | End: 2023-04-28 | Stop reason: ALTCHOICE

## 2022-06-22 NOTE — PROGRESS NOTES
Subjective:       Patient ID: Beth Chaudhary is a 61 y.o. female.    Chief Complaint: Sore Throat (Sore throat, headache, fatigue x 6 days)    HPI   Patient presents to the clinic with sore throat, headache, and fatigue for approximately 1 week.  Her sore throat is not severe, she has been vaccinated for COVID.  She also has some skin lesions on her left thigh over a skin graft that she would like to have some antibiotic ointment for.  Review of Systems   Constitutional: Positive for fatigue. Negative for fever.   HENT: Positive for sore throat.    Respiratory: Negative.    Cardiovascular: Negative.    Gastrointestinal: Negative.    Neurological: Positive for headaches.         Objective:   There were no vitals filed for this visit.There is no height or weight on file to calculate BMI.     Physical Exam  Constitutional:       Appearance: Normal appearance.   HENT:      Head: Normocephalic.      Nose: Nose normal.      Mouth/Throat:      Mouth: Mucous membranes are moist.   Eyes:      Conjunctiva/sclera: Conjunctivae normal.   Cardiovascular:      Rate and Rhythm: Normal rate.   Pulmonary:      Effort: Pulmonary effort is normal.      Breath sounds: Normal breath sounds.       skin examination shows some excoriated type lesions, left thigh without signs of secondary infection  Results for orders placed or performed in visit on 06/22/22   POCT COVID-19 Rapid Screening   Result Value Ref Range    POC Rapid COVID Negative Negative     Acceptable Yes    POCT rapid strep A   Result Value Ref Range    Rapid Strep A Screen Negative Negative     Acceptable Yes        Assessment:     Problem List Items Addressed This Visit    None     Visit Diagnoses     Sore throat    -  Primary    Relevant Orders    POCT COVID-19 Rapid Screening (Completed)    POCT rapid strep A (Completed)    Viral infection        Skin lesion        Relevant Medications    mupirocin (BACTROBAN) 2 % ointment              Medication List with Changes/Refills   New Medications    MUPIROCIN (BACTROBAN) 2 % OINTMENT    Apply topically 3 (three) times daily.   Current Medications    ALBUTEROL (PROVENTIL/VENTOLIN HFA) 90 MCG/ACTUATION INHALER    Inhale 2 puffs into the lungs every 6 (six) hours as needed.    ALPHA LIPOIC ACID 300 MG CAP    Take 1 capsule by mouth every morning.    CHOLECALCIFEROL, VITAMIN D3, (VITAMIN D3) 25 MCG (1,000 UNIT) CAPSULE    Take 2,500 Units by mouth.    FIASP FLEXTOUCH U-100 INSULIN 100 UNIT/ML (3 ML) INPN    Inject into the skin.    FLUTICASONE PROPIONATE (FLONASE) 50 MCG/ACTUATION NASAL SPRAY        GINKGO BILOBA ORAL    Take 2 tablets by mouth.    HYDROCHLOROTHIAZIDE (MICROZIDE) 12.5 MG CAPSULE    Take 12.5 mg by mouth every morning.    LORAZEPAM (ATIVAN) 2 MG TAB    Take 2 mg by mouth every 8 (eight) hours as needed.    OLMESARTAN (BENICAR) 5 MG TAB    Take 10 mg by mouth 2 (two) times daily.    PRAVASTATIN (PRAVACHOL) 40 MG TABLET    Take 40 mg by mouth once daily.    TOUJEO SOLOSTAR U-300 INSULIN 300 UNIT/ML (1.5 ML) INPN PEN    SMARTSI Unit(s) SUB-Q Daily    VENLAFAXINE (EFFEXOR-XR) 150 MG CP24    Take 150 mg by mouth once daily.     Plan:           Ibuprofen for Tylenol for fever/pain as needed.  Increase fluid intake.  Suggest taking combination guaifenesin and or dextromethorphan medication for significant coughing, Mucinex and Delsym or examples.  Watch for worsening symptoms and contact this clinic or return for any significant problems.    Watch for signs of secondary infection and contact the clinic if this occurs, signs of secondary infection would be:  -increasing localized pain, especially sinus or throat pain.  -development of yellow/green purulent mucus in significant amounts.  -development of worsening symptoms/fever over time.    If a secondary infection were to occur, and antibiotic may be required, contact the clinic or primary care physician/provider.      No follow-ups on  file.

## 2022-06-22 NOTE — PROGRESS NOTES
Subjective:       Patient ID: Beth Chaudhary is a 61 y.o. female.    Vitals:  vitals were not taken for this visit.     Chief Complaint: Sore Throat (Sore throat, headache, fatigue x 6 days)    Sore throat, headache, fatigue x 6 days    Sore Throat   This is a new problem. The current episode started in the past 7 days. The problem has been gradually improving. Neither side of throat is experiencing more pain than the other. There has been no fever. The pain is at a severity of 8/10. The pain is severe. Associated symptoms include headaches. She has tried NSAIDs for the symptoms. The treatment provided no relief.       HENT: Positive for sore throat.    Neurological: Positive for headaches.       Objective:      Physical Exam      Assessment:       No diagnosis found.      Plan:         There are no diagnoses linked to this encounter.

## 2022-06-27 ENCOUNTER — HOSPITAL ENCOUNTER (EMERGENCY)
Facility: HOSPITAL | Age: 62
Discharge: HOME | End: 2022-06-27
Attending: INTERNAL MEDICINE
Payer: COMMERCIAL

## 2022-06-27 VITALS
HEART RATE: 84 BPM | SYSTOLIC BLOOD PRESSURE: 121 MMHG | BODY MASS INDEX: 25.9 KG/M2 | TEMPERATURE: 98 F | HEIGHT: 67 IN | WEIGHT: 165 LBS | RESPIRATION RATE: 18 BRPM | OXYGEN SATURATION: 98 % | DIASTOLIC BLOOD PRESSURE: 80 MMHG

## 2022-06-27 DIAGNOSIS — R10.84 ABDOMINAL PAIN, GENERALIZED: ICD-10-CM

## 2022-06-27 DIAGNOSIS — S39.012A STRAIN OF LUMBAR REGION, INITIAL ENCOUNTER: Primary | ICD-10-CM

## 2022-06-27 LAB
ALBUMIN SERPL-MCNC: 3.6 GM/DL (ref 3.4–4.8)
ALBUMIN/GLOB SERPL: 1 RATIO (ref 1.1–2)
ALP SERPL-CCNC: 81 UNIT/L (ref 40–150)
ALT SERPL-CCNC: 23 UNIT/L (ref 0–55)
APPEARANCE UR: CLEAR
AST SERPL-CCNC: 26 UNIT/L (ref 5–34)
BASOPHILS # BLD AUTO: 0.06 X10(3)/MCL (ref 0–0.2)
BASOPHILS NFR BLD AUTO: 0.9 %
BILIRUB UR QL STRIP.AUTO: NEGATIVE MG/DL
BILIRUBIN DIRECT+TOT PNL SERPL-MCNC: 0.3 MG/DL
BUN SERPL-MCNC: 20 MG/DL (ref 9.8–20.1)
CALCIUM SERPL-MCNC: 9.3 MG/DL (ref 8.4–10.2)
CHLORIDE SERPL-SCNC: 103 MMOL/L (ref 98–107)
CO2 SERPL-SCNC: 30 MMOL/L (ref 23–31)
COLOR UR AUTO: ABNORMAL
CREAT SERPL-MCNC: 1.19 MG/DL (ref 0.55–1.02)
EOSINOPHIL # BLD AUTO: 0.37 X10(3)/MCL (ref 0–0.9)
EOSINOPHIL NFR BLD AUTO: 5.4 %
ERYTHROCYTE [DISTWIDTH] IN BLOOD BY AUTOMATED COUNT: 12.3 % (ref 11.5–17)
GLOBULIN SER-MCNC: 3.6 GM/DL (ref 2.4–3.5)
GLUCOSE SERPL-MCNC: 65 MG/DL (ref 82–115)
GLUCOSE UR QL STRIP.AUTO: NEGATIVE MG/DL
HCT VFR BLD AUTO: 39 % (ref 37–47)
HGB BLD-MCNC: 12.7 GM/DL (ref 12–16)
IMM GRANULOCYTES # BLD AUTO: 0.03 X10(3)/MCL (ref 0–0.02)
IMM GRANULOCYTES NFR BLD AUTO: 0.4 % (ref 0–0.43)
INR BLD: 0.93 (ref 2–3)
KETONES UR QL STRIP.AUTO: NEGATIVE MG/DL
LEUKOCYTE ESTERASE UR QL STRIP.AUTO: NEGATIVE UNIT/L
LIPASE SERPL-CCNC: 8 U/L
LYMPHOCYTES # BLD AUTO: 2.49 X10(3)/MCL (ref 0.6–4.6)
LYMPHOCYTES NFR BLD AUTO: 36.1 %
MCH RBC QN AUTO: 30.8 PG (ref 27–31)
MCHC RBC AUTO-ENTMCNC: 32.6 MG/DL (ref 33–36)
MCV RBC AUTO: 94.4 FL (ref 80–94)
MONOCYTES # BLD AUTO: 0.63 X10(3)/MCL (ref 0.1–1.3)
MONOCYTES NFR BLD AUTO: 9.1 %
NEUTROPHILS # BLD AUTO: 3.3 X10(3)/MCL (ref 2.1–9.2)
NEUTROPHILS NFR BLD AUTO: 48.1 %
NITRITE UR QL STRIP.AUTO: NEGATIVE
NRBC BLD AUTO-RTO: 0 %
PH UR STRIP.AUTO: 7 [PH]
PLATELET # BLD AUTO: 247 X10(3)/MCL (ref 130–400)
PMV BLD AUTO: 9.9 FL (ref 9.4–12.4)
POTASSIUM SERPL-SCNC: 4.4 MMOL/L (ref 3.5–5.1)
PROT SERPL-MCNC: 7.2 GM/DL (ref 5.8–7.6)
PROT UR QL STRIP.AUTO: NEGATIVE MG/DL
PROTHROMBIN TIME: 12.3 SECONDS (ref 11.7–14.5)
RBC # BLD AUTO: 4.13 X10(6)/MCL (ref 4.2–5.4)
RBC UR QL AUTO: NEGATIVE UNIT/L
SODIUM SERPL-SCNC: 142 MMOL/L (ref 136–145)
SP GR UR STRIP.AUTO: 1.01
UROBILINOGEN UR STRIP-ACNC: 0.2 MG/DL
WBC # SPEC AUTO: 6.9 X10(3)/MCL (ref 4.5–11.5)

## 2022-06-27 PROCEDURE — 80053 COMPREHEN METABOLIC PANEL: CPT | Performed by: INTERNAL MEDICINE

## 2022-06-27 PROCEDURE — 83690 ASSAY OF LIPASE: CPT | Performed by: INTERNAL MEDICINE

## 2022-06-27 PROCEDURE — 81003 URINALYSIS AUTO W/O SCOPE: CPT | Performed by: INTERNAL MEDICINE

## 2022-06-27 PROCEDURE — 36415 COLL VENOUS BLD VENIPUNCTURE: CPT | Performed by: INTERNAL MEDICINE

## 2022-06-27 PROCEDURE — 85610 PROTHROMBIN TIME: CPT | Performed by: INTERNAL MEDICINE

## 2022-06-27 PROCEDURE — 96375 TX/PRO/DX INJ NEW DRUG ADDON: CPT

## 2022-06-27 PROCEDURE — 25000003 PHARM REV CODE 250: Performed by: INTERNAL MEDICINE

## 2022-06-27 PROCEDURE — 63600175 PHARM REV CODE 636 W HCPCS: Performed by: INTERNAL MEDICINE

## 2022-06-27 PROCEDURE — 96361 HYDRATE IV INFUSION ADD-ON: CPT

## 2022-06-27 PROCEDURE — 85025 COMPLETE CBC W/AUTO DIFF WBC: CPT | Performed by: INTERNAL MEDICINE

## 2022-06-27 PROCEDURE — 99285 EMERGENCY DEPT VISIT HI MDM: CPT | Mod: 25

## 2022-06-27 PROCEDURE — 96374 THER/PROPH/DIAG INJ IV PUSH: CPT

## 2022-06-27 RX ORDER — METHYLPREDNISOLONE SOD SUCC 125 MG
125 VIAL (EA) INJECTION ONCE
Status: COMPLETED | OUTPATIENT
Start: 2022-06-27 | End: 2022-06-27

## 2022-06-27 RX ORDER — TIZANIDINE HYDROCHLORIDE 4 MG/1
4 CAPSULE, GELATIN COATED ORAL EVERY 8 HOURS PRN
Qty: 21 CAPSULE | Refills: 0 | Status: SHIPPED | OUTPATIENT
Start: 2022-06-27 | End: 2023-08-22

## 2022-06-27 RX ORDER — OXYCODONE AND ACETAMINOPHEN 7.5; 325 MG/1; MG/1
1 TABLET ORAL EVERY 6 HOURS PRN
Qty: 12 TABLET | Refills: 0 | Status: SHIPPED | OUTPATIENT
Start: 2022-06-27 | End: 2022-06-30

## 2022-06-27 RX ORDER — HYDROMORPHONE HYDROCHLORIDE 2 MG/ML
1 INJECTION, SOLUTION INTRAMUSCULAR; INTRAVENOUS; SUBCUTANEOUS ONCE
Status: COMPLETED | OUTPATIENT
Start: 2022-06-27 | End: 2022-06-27

## 2022-06-27 RX ADMIN — HYDROMORPHONE HYDROCHLORIDE 1 MG: 2 INJECTION, SOLUTION INTRAMUSCULAR; INTRAVENOUS; SUBCUTANEOUS at 11:06

## 2022-06-27 RX ADMIN — SODIUM CHLORIDE 500 ML: 9 INJECTION, SOLUTION INTRAVENOUS at 10:06

## 2022-06-27 RX ADMIN — METHYLPREDNISOLONE SODIUM SUCCINATE 125 MG: 125 INJECTION, POWDER, FOR SOLUTION INTRAMUSCULAR; INTRAVENOUS at 11:06

## 2022-06-28 NOTE — ED PROVIDER NOTES
Source of History:  Patient, no limitations    Chief complaint:  Back Pain and Abdominal Pain      HPI:  Beth Chaudhary is a 61 y.o. female presenting with Back Pain and Abdominal Pain       Low back pain radiating into abdomen, present for 2 weeks, was on pain management years ago, no numbness or tingling, worse with movement, no flank pain, no fever, no vomiting, +constipation        Review of Systems   Constitutional symptoms:  Negative except as documented in HPI.   Skin symptoms:  Negative except as documented in HPI.   HEENT symptoms:  Negative except as documented in HPI.   Respiratory symptoms:  Negative except as documented in HPI.   Cardiovascular symptoms:  Negative except as documented in HPI.   Gastrointestinal symptoms:  Negative except as documented in HPI.    Genitourinary symptoms:  Negative except as documented in HPI.   Musculoskeletal symptoms:  Negative except as documented in HPI.   Neurologic symptoms:  Negative except as documented in HPI.   Psychiatric symptoms:  Negative except as documented in HPI.   Allergy/immunologic symptoms:  Negative except as documented in HPI.             Additional review of systems information: All other systems reviewed and otherwise negative.      Review of patient's allergies indicates:   Allergen Reactions    Gabapentin      Other reaction(s): Hives    Morphine      Other reaction(s): morphine  Other reaction(s): morphine      Sulfa (sulfonamide antibiotics)     Codeine Rash       PMH:  As per HPI and below:    Past Medical History:   Diagnosis Date    Diabetes mellitus type I     Hyperlipidemia     Hypotension         Family History   Problem Relation Age of Onset    Thyroid disease Sister        Past Surgical History:   Procedure Laterality Date    APPENDECTOMY      CHOLECYSTECTOMY      TONSILLECTOMY         Social History     Tobacco Use    Smoking status: Former Smoker    Smokeless tobacco: Never Used   Substance Use Topics    Alcohol  "use: Not Currently    Drug use: Never       There is no problem list on file for this patient.       Physical Exam:    /80   Pulse 84   Temp 97.7 °F (36.5 °C) (Oral)   Resp 18   Ht 5' 7" (1.702 m)   Wt 74.8 kg (165 lb)   SpO2 98%   Breastfeeding No   BMI 25.84 kg/m²     Nursing note and vital signs reviewed.    General:  Alert, appears uncomfortable  Skin: Normal for Ethnic Origin, No cyanosis  HEENT: Normocephalic and atraumatic, Vision unchanged, Pupils symmetric, No icterus , Nasal mucosa is pink and moist  Cardiovascular:  Regular rate and rhythm, No edema  Chest Wall: No deformity, equal chest rise  Respiratory:  Lungs are clear to auscultation, respirations are non-labored.    Musculoskeletal:  No deformity, Normal perfusion to all extremities  Back: No bony tenderness, painful ROM low back  : No suprapubic pain, No CVA tenderness  Gastrointestinal:  Soft, generalized abdominal tenderness, Non distended, Normal bowel sounds.    Neurological:  Alert and oriented to person, place, time, and situation, normal motor observed, normal speech observed.    Psychiatric:  Cooperative, appropriate mood & affect.        Labs that have been ordered have been independently reviewed and interpreted by myself.     Old Chart Reviewed.      Initial Impression/ Differential Dx:  GERD, intestinal spasm, gastroenteritis, gastritis, ulcer, cholecystitis, cholelithiasis, gallstones, pancreatitis, ileus, small bowel obstruction, appendicitis, diverticulitis, colitis, constipation, intestinal gas pain.      MDM:      Reviewed Nurses Note.    Reviewed Pertinent old records.    Orders Placed This Encounter    X-Ray Abdomen Flat And Erect    CT Abdomen Pelvis  Without Contrast    Urinalysis, Reflex to Urine Culture Urine, Clean Catch    CBC Auto Differential    Comprehensive Metabolic Panel    Protime-INR    Lipase    CBC with Differential    sodium chloride 0.9% bolus 500 mL    HYDROmorphone (PF) injection 1 " mg    methylPREDNISolone sodium succinate injection 125 mg    tiZANidine 4 mg Cap    oxyCODONE-acetaminophen (PERCOCET) 7.5-325 mg per tablet                    Labs Reviewed   URINALYSIS, REFLEX TO URINE CULTURE - Abnormal; Notable for the following components:       Result Value    Color, UA Straw (*)     All other components within normal limits   COMPREHENSIVE METABOLIC PANEL - Abnormal; Notable for the following components:    Glucose Level 65 (*)     Creatinine 1.19 (*)     Globulin 3.6 (*)     Albumin/Globulin Ratio 1.0 (*)     All other components within normal limits   PROTIME-INR - Abnormal; Notable for the following components:    INR 0.93 (*)     All other components within normal limits   CBC WITH DIFFERENTIAL - Abnormal; Notable for the following components:    RBC 4.13 (*)     MCV 94.4 (*)     MCHC 32.6 (*)     IG# 0.03 (*)     All other components within normal limits   LIPASE - Normal   CBC W/ AUTO DIFFERENTIAL    Narrative:     The following orders were created for panel order CBC Auto Differential.  Procedure                               Abnormality         Status                     ---------                               -----------         ------                     CBC with Differential[[id number]]        Abnormal            Final result                 Please view results for these tests on the individual orders.          CT Abdomen Pelvis  Without Contrast         X-Ray Abdomen Flat And Erect    (Results Pending)        Admission on 06/27/2022, Discharged on 06/27/2022   Component Date Value Ref Range Status    Color, UA 06/27/2022 Straw (A) Yellow, Colorless, Other, Clear Final    Appearance, UA 06/27/2022 Clear  Clear Final    Specific [place], UA 06/27/2022 1.010   Final    pH, UA 06/27/2022 7.0  5.0, 5.5, 6.0, 6.5, 7.0, 7.5, 8.0, 8.5 Final    Protein, UA 06/27/2022 Negative  Negative, 300  mg/dL Final    Glucose, UA 06/27/2022 Negative  Negative, Normal mg/dL Final    Ketones,  UA 06/27/2022 Negative  Negative, +1, +2, +3, +4, +5, >=160, >=80 mg/dL Final    Blood, UA 06/27/2022 Negative  Negative unit/L Final    Bilirubin, UA 06/27/2022 Negative  Negative mg/dL Final    Urobilinogen, UA 06/27/2022 0.2  0.2, 1.0, Normal mg/dL Final    Nitrites, UA 06/27/2022 Negative  Negative Final    Leukocyte Esterase, UA 06/27/2022 Negative  Negative, 75  unit/L Final    Sodium Level 06/27/2022 142  136 - 145 mmol/L Final    Potassium Level 06/27/2022 4.4  3.5 - 5.1 mmol/L Final    Chloride 06/27/2022 103  98 - 107 mmol/L Final    Carbon Dioxide 06/27/2022 30  23 - 31 mmol/L Final    Glucose Level 06/27/2022 65 (A) 82 - 115 mg/dL Final    Blood Urea Nitrogen 06/27/2022 20.0  9.8 - 20.1 mg/dL Final    Creatinine 06/27/2022 1.19 (A) 0.55 - 1.02 mg/dL Final    Calcium Level Total 06/27/2022 9.3  8.4 - 10.2 mg/dL Final    Protein Total 06/27/2022 7.2  5.8 - 7.6 gm/dL Final    Albumin Level 06/27/2022 3.6  3.4 - 4.8 gm/dL Final    Globulin 06/27/2022 3.6 (A) 2.4 - 3.5 gm/dL Final    Albumin/Globulin Ratio 06/27/2022 1.0 (A) 1.1 - 2.0 ratio Final    Bilirubin Total 06/27/2022 0.3  <=1.5 mg/dL Final    Alkaline Phosphatase 06/27/2022 81  40 - 150 unit/L Final    Alanine Aminotransferase 06/27/2022 23  0 - 55 unit/L Final    Aspartate Aminotransferase 06/27/2022 26  5 - 34 unit/L Final    Estimated GFR-Non  06/27/2022 49  mls/min/1.73/m2 Final    PT 06/27/2022 12.3  11.7 - 14.5 seconds Final    INR 06/27/2022 0.93 (A) 2.00 - 3.00 Final    Lipase Level 06/27/2022 8  <=60 U/L Final    WBC 06/27/2022 6.9  4.5 - 11.5 x10(3)/mcL Final    RBC 06/27/2022 4.13 (A) 4.20 - 5.40 x10(6)/mcL Final    Hgb 06/27/2022 12.7  12.0 - 16.0 gm/dL Final    Hct 06/27/2022 39.0  37.0 - 47.0 % Final    MCV 06/27/2022 94.4 (A) 80.0 - 94.0 fL Final    MCH 06/27/2022 30.8  27.0 - 31.0 pg Final    MCHC 06/27/2022 32.6 (A) 33.0 - 36.0 mg/dL Final    RDW 06/27/2022 12.3  11.5 - 17.0 % Final     Platelet 06/27/2022 247  130 - 400 x10(3)/mcL Final    MPV 06/27/2022 9.9  9.4 - 12.4 fL Final    Neut % 06/27/2022 48.1  % Final    Lymph % 06/27/2022 36.1  % Final    Mono % 06/27/2022 9.1  % Final    Eos % 06/27/2022 5.4  % Final    Basophil % 06/27/2022 0.9  % Final    Lymph # 06/27/2022 2.49  0.6 - 4.6 x10(3)/mcL Final    Neut # 06/27/2022 3.3  2.1 - 9.2 x10(3)/mcL Final    Mono # 06/27/2022 0.63  0.1 - 1.3 x10(3)/mcL Final    Eos # 06/27/2022 0.37  0 - 0.9 x10(3)/mcL Final    Baso # 06/27/2022 0.06  0 - 0.2 x10(3)/mcL Final    IG# 06/27/2022 0.03 (A) 0 - 0.0155 x10(3)/mcL Final    IG% 06/27/2022 0.4  0 - 0.43 % Final    NRBC% 06/27/2022 0.0  % Final       Imaging Results          CT Abdomen Pelvis  Without Contrast (Preliminary result)  Result time 06/27/22 22:29:37    Preliminary result by Bijan Hill Jr., MD (06/27/22 22:29:37)                 Narrative:    START OF REPORT:  Technique: CT of the abdomen and pelvis was performed with axial images as well as sagittal and coronal reconstruction images without intravenous contrast renal stone protocol.    Comparison: None available.    Clinical History: Lower abd pain, h.o necrotizing fasciitis.    Dosage Information: Automated Exposure Control was utilized.    Findings:  Lines and Tubes: None.  Thorax:  Lungs: The visualized lung bases appear unremarkable.  Pleura: No pleural effusion is seen.  Heart: The heart size is within normal limits.  Abdomen:  Abdominal Wall: A periumbilical hernia is seen that contains a loop of nonobstructed colon.  Liver: The liver appears unremarkable.  Biliary System: No extrahepatic biliary duct dilatation is seen.  Gallbladder: Surgical clips are seen in the gallbladder fossa consistent with prior cholecystectomy.  Pancreas: The pancreas appears unremarkable.  Spleen: The spleen appears unremarkable.  Adrenals: The adrenal glands appear unremarkable.  Kidneys: The kidneys appear unremarkable with no  stones cysts masses or hydronephrosis.  Aorta: There is mild calcification of the abdominal aorta and its branches.  IVC: Unremarkable.  Bowel:  Esophagus: The visualized esophagus appears unremarkable.  Stomach: The stomach appears unremarkable.  Duodenum: Unremarkable appearing duodenum.  Small Bowel: The small bowel appears unremarkable.  Colon: Nondistended.  Appendix: The appendix is not identified but no inflammatory changes are seen in the right lower quadrant to suggest appendicitis.  Peritoneum: No intraperitoneal free air or ascites is seen.    Pelvis:  Bladder: The bladder appears unremarkable.  Female:  Uterus: The uterus appears unremarkable.  Ovaries: No adnexal masses are seen.    Bony structures:  Dorsal Spine: There is mild spondylosis of the visualized dorsal spine.      Impression:  1. A periumbilical hernia is seen that contains a loop of nonobstructed colon.  2. No acute intraabdominal or pelvic pathology is identified. Details as above.                                 X-Ray Abdomen Flat And Erect (In process)                                                      Diagnostic Impression:    1. Strain of lumbar region, initial encounter    2. Abdominal pain, generalized         ED Disposition Condition    Discharge Stable           Follow-up Information     Eligio Womack Jr In 1 week.    Specialty: Internal Medicine  Contact information:  [facility] [address] [facility] [address]  [phone]                          ED Prescriptions     Medication Sig Dispense Start Date End Date Auth. Provider    tiZANidine 4 mg Cap Take 4 mg by mouth every 8 (eight) hours as needed (muscle spasm). 21 capsule 6/27/2022  Orlando Delcid DO    oxyCODONE-acetaminophen (PERCOCET) 7.5-325 mg per tablet Take 1 tablet by mouth every 6 (six) hours as needed for Pain. 12 tablet 6/27/2022 6/30/2022 Orlando Delcid DO        Follow-up Information     Follow up With Specialties Details Why Contact Info    Eligio  BEN Womack Jr. Internal Medicine In 1 week  [address] [facility] [address]  [phone]             Orlando Delcid,   06/28/22 5021

## 2022-07-27 ENCOUNTER — PATIENT OUTREACH (OUTPATIENT)
Dept: ADMINISTRATIVE | Facility: HOSPITAL | Age: 62
End: 2022-07-27
Payer: COMMERCIAL

## 2022-07-27 NOTE — PROGRESS NOTES
Population Health Outreach. The following record(s)  below were uploaded for Health Maintenance .    MAMMOGRAM SCREENING     01/21/2019         PAP SMEAR 12/16/2021    COLONOSCOPY     12/21/2018

## 2022-09-21 ENCOUNTER — HISTORICAL (OUTPATIENT)
Dept: ADMINISTRATIVE | Facility: HOSPITAL | Age: 62
End: 2022-09-21
Payer: COMMERCIAL

## 2022-10-07 ENCOUNTER — HOSPITAL ENCOUNTER (EMERGENCY)
Facility: HOSPITAL | Age: 62
Discharge: HOME | End: 2022-10-07
Attending: FAMILY MEDICINE
Payer: COMMERCIAL

## 2022-10-07 VITALS
HEIGHT: 67 IN | BODY MASS INDEX: 26.68 KG/M2 | WEIGHT: 170 LBS | RESPIRATION RATE: 18 BRPM | HEART RATE: 87 BPM | TEMPERATURE: 98 F | SYSTOLIC BLOOD PRESSURE: 111 MMHG | OXYGEN SATURATION: 95 % | DIASTOLIC BLOOD PRESSURE: 68 MMHG

## 2022-10-07 DIAGNOSIS — S02.2XXA CLOSED FRACTURE OF NASAL BONE, INITIAL ENCOUNTER: ICD-10-CM
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
## 2022-10-07 DIAGNOSIS — S00.03XA HEMATOMA OF FRONTAL SCALP, INITIAL ENCOUNTER: Primary | ICD-10-CM

## 2022-10-07 DIAGNOSIS — S02.2XXA NASAL SEPTUM FRACTURE, CLOSED, INITIAL ENCOUNTER: ICD-10-CM

## 2022-10-07 PROCEDURE — 96361 HYDRATE IV INFUSION ADD-ON: CPT

## 2022-10-07 PROCEDURE — 96374 THER/PROPH/DIAG INJ IV PUSH: CPT

## 2022-10-07 PROCEDURE — 63600175 PHARM REV CODE 636 W HCPCS: Performed by: PHYSICIAN ASSISTANT

## 2022-10-07 PROCEDURE — 99285 EMERGENCY DEPT VISIT HI MDM: CPT | Mod: 25

## 2022-10-07 PROCEDURE — 25000003 PHARM REV CODE 250: Performed by: PHYSICIAN ASSISTANT

## 2022-10-07 RX ORDER — OXYCODONE AND ACETAMINOPHEN 5; 325 MG/1; MG/1
1 TABLET ORAL EVERY 6 HOURS PRN
Qty: 20 TABLET | Refills: 0 | Status: SHIPPED | OUTPATIENT
Start: 2022-10-07 | End: 2022-10-12

## 2022-10-07 RX ORDER — HYDROCODONE BITARTRATE AND ACETAMINOPHEN 10; 325 MG/1; MG/1
1 TABLET ORAL
Status: COMPLETED | OUTPATIENT
Start: 2022-10-07 | End: 2022-10-07

## 2022-10-07 RX ORDER — HYDROMORPHONE HYDROCHLORIDE 2 MG/ML
1 INJECTION, SOLUTION INTRAMUSCULAR; INTRAVENOUS; SUBCUTANEOUS
Status: COMPLETED | OUTPATIENT
Start: 2022-10-07 | End: 2022-10-07

## 2022-10-07 RX ORDER — HYDROMORPHONE HYDROCHLORIDE 2 MG/ML
1 INJECTION, SOLUTION INTRAMUSCULAR; INTRAVENOUS; SUBCUTANEOUS
Status: DISCONTINUED | OUTPATIENT
Start: 2022-10-07 | End: 2022-10-07

## 2022-10-07 RX ORDER — OXYCODONE AND ACETAMINOPHEN 5; 325 MG/1; MG/1
1 TABLET ORAL EVERY 6 HOURS PRN
Qty: 20 TABLET | Refills: 0 | Status: SHIPPED | OUTPATIENT
Start: 2022-10-07 | End: 2022-10-07 | Stop reason: SDUPTHER

## 2022-10-07 RX ADMIN — HYDROMORPHONE HYDROCHLORIDE 1 MG: 2 INJECTION, SOLUTION INTRAMUSCULAR; INTRAVENOUS; SUBCUTANEOUS at 10:10

## 2022-10-07 RX ADMIN — SODIUM CHLORIDE 1000 ML: 9 INJECTION, SOLUTION INTRAVENOUS at 10:10

## 2022-10-07 RX ADMIN — HYDROCODONE BITARTRATE AND ACETAMINOPHEN 1 TABLET: 10; 325 TABLET ORAL at 09:10

## 2022-10-08 NOTE — ED PROVIDER NOTES
"Encounter Date: 10/7/2022       History     Chief Complaint   Patient presents with    Facial Injury     62 y.o. female presents to the ED with facial pain as well as right-sided rib pain onset PTA after fall. Patient states her PCP gave her tizanidine this week and she took it for the first time tonight and states it made her feel "woozy." Notes when she started walking and she felt as if she needed to stop and catch herself, so she tried lowering herself to the floor and states when she sat down she fell face forward hitting the ground with her nose. Denies LOC, n/v, blurred vision, dizziness, neck pain. States she "just wanted to come get checked."    The history is provided by the patient. No  was used.   Facial Injury   The current episode started just prior to arrival. The incident occurred at home. The injury mechanism was a direct blow and a fall. The wounds were not self-inflicted. Pertinent negatives include no chest pain, no altered mental status, no visual disturbance, no nausea, no headaches, no neck pain, no focal weakness, no loss of consciousness, no tingling and no weakness. She has been Behaving normally. Recently, medical care has been given by the PCP.   Review of patient's allergies indicates:   Allergen Reactions    Gabapentin      Other reaction(s): Hives    Morphine      Other reaction(s): morphine  Other reaction(s): morphine      Sulfa (sulfonamide antibiotics)     Codeine Rash     Past Medical History:   Diagnosis Date    Diabetes mellitus type I     Hyperlipidemia     Hypotension     Personal history of colonic polyps 12/21/2018     Past Surgical History:   Procedure Laterality Date    APPENDECTOMY      CHOLECYSTECTOMY      COLONOSCOPY N/A 12/21/2018    TONSILLECTOMY       Family History   Problem Relation Age of Onset    Thyroid disease Sister      Social History     Tobacco Use    Smoking status: Former    Smokeless tobacco: Never   Substance Use Topics    Alcohol " use: Not Currently    Drug use: Never     Review of Systems   Constitutional:  Negative for fever.   HENT:  Negative for sore throat.         Facial pain   Eyes:  Negative for visual disturbance.   Respiratory:  Negative for shortness of breath.    Cardiovascular:  Negative for chest pain.   Gastrointestinal:  Negative for nausea.   Genitourinary:  Negative for dysuria.   Musculoskeletal:  Negative for back pain and neck pain.   Skin:  Negative for rash.   Neurological:  Negative for tingling, focal weakness, loss of consciousness, weakness and headaches.   Hematological:  Does not bruise/bleed easily.   All other systems reviewed and are negative.    Physical Exam     Initial Vitals [10/07/22 2031]   BP Pulse Resp Temp SpO2   97/64 98 20 97.9 °F (36.6 °C) 98 %      MAP       --         Physical Exam    Nursing note and vitals reviewed.  Constitutional: She appears well-developed and well-nourished.   HENT:   Head: Normocephalic and atraumatic. Head is without raccoon's eyes.       Nose: Sinus tenderness present. No nose lacerations, nasal deformity, septal deviation or nasal septal hematoma. No epistaxis.   Gross swelling and ecchymosis noted to nose   Eyes: EOM are normal. Pupils are equal, round, and reactive to light.   Neck: Neck supple.    Full passive range of motion without pain.     Cardiovascular:  Normal rate, regular rhythm and normal heart sounds.           Pulmonary/Chest: Breath sounds normal. She exhibits no tenderness, no laceration, no deformity and no retraction.     Abdominal: Abdomen is soft. Bowel sounds are normal. There is no abdominal tenderness.   Musculoskeletal:         General: Normal range of motion.      Cervical back: Full passive range of motion without pain and neck supple.     Neurological: She is alert and oriented to person, place, and time. She has normal strength. GCS score is 15. GCS eye subscore is 4. GCS verbal subscore is 5. GCS motor subscore is 6.   Skin: Skin is warm and  dry.   Psychiatric: She has a normal mood and affect.       ED Course   Procedures  Labs Reviewed - No data to display       Imaging Results              CT Chest Without Contrast (Final result)  Result time 10/07/22 21:53:31      Final result by Mateo Esteves MD (10/07/22 21:53:31)                   Impression:      No acute findings.      Electronically signed by: Job Esteves MD  Date:    10/07/2022  Time:    21:53               Narrative:    EXAMINATION:  CT CHEST WITHOUT CONTRAST    CLINICAL HISTORY:  Rib fracture suspected, traumatic;    TECHNIQUE:  Low dose axial images, sagittal and coronal reformations were obtained from the thoracic inlet to the lung bases. Contrast was not administered.  .  Automated exposure control used.    COMPARISON:  None.    FINDINGS:  Heart, pericardium, trachea, esophagus normal.  No mediastinal mass, lymphadenopathy, mediastinal hematoma, or hilar enlargement.  Soft tissues of the surrounding chest wall are normal.  Visualized upper abdominal organs are normal.  Cholecystectomy incidentally noted.  Lungs clear without infiltrate, consolidation, pleural fluid, pneumothorax, or pulmonary nodule.  Bones are intact.  No fracture or dislocation seen.  Vertebral body heights maintained.                                       CT Maxillofacial Without Contrast (Final result)  Result time 10/07/22 21:56:24      Final result by Mateo Esteves MD (10/07/22 21:56:24)                   Impression:      Complex nasal fracture and anterior nasal septal fracture.      Electronically signed by: Job Esteves MD  Date:    10/07/2022  Time:    21:56               Narrative:    EXAMINATION:  CT MAXILLOFACIAL WITHOUT CONTRAST    CLINICAL HISTORY:  Facial trauma, blunt;    TECHNIQUE:  Low dose axial images, sagittal and coronal reformations were obtained through the face.  Contrast was not administered.  .  Automated exposure control  used.    COMPARISON:  None    FINDINGS:  Recent appearing complex nasal fracture extending across the nasal bridge.  Regional soft tissue swelling and midline spur frontal scalp hematoma.  Suspected fracture of the anterior nasal septum with sharp deviation anteriorly..  Chronic appearing irregularity irregularity of the left medial orbital wall.  No acute fracture evident.  Zygomatic arches and mandible intact.  Mucosal thickening anterior ethmoid sinuses.  Paranasal sinuses otherwise normally aerated.  Regional soft tissues otherwise normal.  Orbital contents normal.                                       CT Head Without Contrast (Final result)  Result time 10/07/22 21:51:54      Final result by Mateo Esteves MD (10/07/22 21:51:54)                   Impression:      No acute intracranial abnormality.  Acute nasal bone fracture, midline scalp hematoma..      Electronically signed by: Job Esteves MD  Date:    10/07/2022  Time:    21:51               Narrative:    EXAMINATION:  CT HEAD WITHOUT CONTRAST    CLINICAL HISTORY:  Head trauma, moderate-severe;    TECHNIQUE:  Low dose axial CT images obtained throughout the head without intravenous contrast. Sagittal and coronal reconstructions were performed.  .  Automated exposure control used.    COMPARISON:  None.    FINDINGS:  Intracranial compartment:    Ventricles and sulci are normal in size for age without evidence of hydrocephalus. No extra-axial blood or fluid collections.    The brain parenchyma appears normal. No parenchymal mass, hemorrhage, edema or major vascular distribution infarct.    Skull/extracranial contents (limited evaluation): Acute appearing nasal fracture.  Midline frontal scalp hematoma.  Mastoid air cells and paranasal sinuses are essentially clear.                                       Medications   HYDROcodone-acetaminophen  mg per tablet 1 tablet (1 tablet Oral Given 10/7/22 2119)   sodium chloride 0.9% bolus 1,000 mL  "(1,000 mLs Intravenous New Bag 10/7/22 2215)   HYDROmorphone (PF) injection 1 mg (1 mg Intravenous Given 10/7/22 2255)     Medical Decision Making:   Differential Diagnosis:   Medication reaction, fall, facial fracture, contusion, rib fracture   Clinical Tests:   Radiological Study: Reviewed and Ordered           ED Course as of 10/07/22 2326   Fri Oct 07, 2022   2214 BP noted to be 82/63 while I was in the room. States she has a hx of hypotension however notes this is low for her. Will give fluids and crackers. States she feels fine besides pain in nose  [MA]   2318 11:21 PM I reassessed the pt.  BP now 111/68 after only about 150ccs of IVF and crackers. The pt is resting comfortably and is NAD. States she feels much better after the pain medication and is ready to go home and rest. Discussed test results, shared treatment plan, specific conditions for return, and the need for f/u. No blowing through the nose and make sure to sleep with head of bed elevated. Referral sent to [facility] for follow up; they will call you with appt. Answered their questions at this time.  Pt understands and agrees to the plan.  The pt has remained hemodynamically stable through ED course and is stable for discharge.  [MA]      ED Course User Index  [MA] Alpesh Velasquez PA-C          REPEAT VS: improving   /68 (BP Location: Right arm, Patient Position: Lying)   Pulse 87   Temp 97.9 °F (36.6 °C) (Oral)   Resp 18   Ht 5' 7" (1.702 m)   Wt 77.1 kg (170 lb)   SpO2 95%   BMI 26.63 kg/m²            Clinical Impression:   Final diagnoses:  [S00.03XA] Hematoma of frontal scalp, initial encounter (Primary)  [S02.2XXA] Closed fracture of nasal bone, initial encounter  [S02.2XXA] Nasal septum fracture, closed, initial encounter  [W19.XXXA] Fall, initial encounter      ED Disposition Condition    Discharge Stable          ED Prescriptions       Medication Sig Dispense Start Date End Date Auth. Provider    oxyCODONE-acetaminophen " (PERCOCET) 5-325 mg per tablet Take 1 tablet by mouth every 6 (six) hours as needed for Pain. 20 tablet 10/7/2022 10/12/2022 Alpesh Velasquez PA-C          Follow-up Information       Follow up With Specialties Details Why Contact Info    Eligio Womack Jr. Internal Medicine   [address] [facility] [address]  [phone]      [facility] - Emergency Dept Emergency Medicine In 1 week If symptoms worsen [address]  [facility] [id number] [phone]    [facility]   Referral has been sent on your behalf; they will call to schedule follow-up appointment              Alpesh Velasquez PA-C  10/07/22 1378

## 2022-10-08 NOTE — ED TRIAGE NOTES
Pt to er with facial injury. Pt states that she went to sit down on the floor and fell over. Denies thinners

## 2022-10-14 ENCOUNTER — HOSPITAL ENCOUNTER (EMERGENCY)
Facility: HOSPITAL | Age: 62
Discharge: HOME | End: 2022-10-14
Attending: EMERGENCY MEDICINE
Payer: COMMERCIAL

## 2022-10-14 VITALS
BODY MASS INDEX: 26.68 KG/M2 | HEIGHT: 67 IN | SYSTOLIC BLOOD PRESSURE: 175 MMHG | WEIGHT: 170 LBS | RESPIRATION RATE: 18 BRPM | TEMPERATURE: 98 F | OXYGEN SATURATION: 99 % | HEART RATE: 97 BPM | DIASTOLIC BLOOD PRESSURE: 93 MMHG

## 2022-10-14 DIAGNOSIS — S22.39XA: Primary | ICD-10-CM
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
## 2022-10-14 PROCEDURE — 63600175 PHARM REV CODE 636 W HCPCS: Performed by: PHYSICIAN ASSISTANT

## 2022-10-14 PROCEDURE — 25000003 PHARM REV CODE 250: Performed by: PHYSICIAN ASSISTANT

## 2022-10-14 PROCEDURE — 94799 UNLISTED PULMONARY SVC/PX: CPT

## 2022-10-14 PROCEDURE — 99284 EMERGENCY DEPT VISIT MOD MDM: CPT | Mod: 25

## 2022-10-14 PROCEDURE — 96372 THER/PROPH/DIAG INJ SC/IM: CPT | Performed by: PHYSICIAN ASSISTANT

## 2022-10-14 RX ORDER — HYDROCODONE BITARTRATE AND ACETAMINOPHEN 10; 325 MG/1; MG/1
1 TABLET ORAL EVERY 4 HOURS PRN
Qty: 5 TABLET | Refills: 0 | Status: SHIPPED | OUTPATIENT
Start: 2022-10-14 | End: 2023-08-22

## 2022-10-14 RX ORDER — HYDROMORPHONE HYDROCHLORIDE 2 MG/ML
2 INJECTION, SOLUTION INTRAMUSCULAR; INTRAVENOUS; SUBCUTANEOUS
Status: COMPLETED | OUTPATIENT
Start: 2022-10-14 | End: 2022-10-14

## 2022-10-14 RX ORDER — ONDANSETRON 4 MG/1
4 TABLET, ORALLY DISINTEGRATING ORAL
Status: COMPLETED | OUTPATIENT
Start: 2022-10-14 | End: 2022-10-14

## 2022-10-14 RX ADMIN — ONDANSETRON 4 MG: 4 TABLET, ORALLY DISINTEGRATING ORAL at 05:10

## 2022-10-14 RX ADMIN — HYDROMORPHONE HYDROCHLORIDE 2 MG: 2 INJECTION, SOLUTION INTRAMUSCULAR; INTRAVENOUS; SUBCUTANEOUS at 05:10

## 2022-10-14 NOTE — ED PROVIDER NOTES
Encounter Date: 10/14/2022       History     Chief Complaint   Patient presents with    Fall     Pt to er c/o bilateral rib area pain s/p fall one week ago. States was seen here following fall.     62-year-old female was involved in a injury/fall a week ago.  She was diagnosed with a complex nasal fracture and anterior nasal septal fracture.  She is scheduled to follow-up with an ENT.  Her main complaint today is not her facial pain.  Her main complaint is right side rib cage.  She denies shortness of breath, denies chest pain her symptoms with her right ribcage worsen with movement.       Review of patient's allergies indicates:   Allergen Reactions    Gabapentin      Other reaction(s): Hives    Morphine      Other reaction(s): morphine  Other reaction(s): morphine      Sulfa (sulfonamide antibiotics)     Codeine Rash     Past Medical History:   Diagnosis Date    Diabetes mellitus type I     Hyperlipidemia     Hypotension     Personal history of colonic polyps 12/21/2018     Past Surgical History:   Procedure Laterality Date    APPENDECTOMY      CHOLECYSTECTOMY      COLONOSCOPY N/A 12/21/2018    TONSILLECTOMY       Family History   Problem Relation Age of Onset    Thyroid disease Sister      Social History     Tobacco Use    Smoking status: Former    Smokeless tobacco: Never   Substance Use Topics    Alcohol use: Not Currently    Drug use: Never     Review of Systems   Constitutional:  Negative for fever.   HENT:  Negative for sore throat.    Respiratory:  Negative for shortness of breath.    Cardiovascular:  Negative for chest pain.   Gastrointestinal:  Negative for nausea.   Genitourinary:  Negative for dysuria.   Musculoskeletal:  Negative for back pain.        RIGHT SIDE RIBCAGE PAIN   Skin:  Negative for rash.   Neurological:  Negative for weakness.   Hematological:  Does not bruise/bleed easily.     Physical Exam     Initial Vitals [10/14/22 1550]   BP Pulse Resp Temp SpO2   (!) 175/93 97 (!) 22 97.9 °F (36.6  °C) 99 %      MAP       --         Physical Exam    Constitutional: She appears well-developed.   Cardiovascular:  Normal rate.           Pulmonary/Chest: Breath sounds normal. No respiratory distress.   Musculoskeletal:      Thoracic back: No lacerations or spasms.      Lumbar back: Normal.      Comments: Tenderness around ribcage right side, worsened with movement, activity, and touch.       Neurological: She is alert and oriented to person, place, and time. She has normal strength.   Skin: Skin is warm.   Psychiatric: Her behavior is normal. Thought content normal.       ED Course   Procedures  Labs Reviewed - No data to display       Imaging Results               X-Ray Ribs 2 View Right (Final result)  Result time 10/14/22 17:23:59      Final result by Nilson Pompa MD (10/14/22 17:23:59)                   Narrative:    EXAMINATION  XR RIBS 2 VIEW RIGHT    CLINICAL HISTORY  Unspecified fall, initial encounter    TECHNIQUE  Total of 4 images of the right ribs.    COMPARISON  None available at the time of initial interpretation.    FINDINGS  The visualized cardiac silhouette, mediastinal structures, and lung fields are without evidence of acute or suspicious focal abnormality.  There is no significant pleural fluid or appreciable pneumothorax.    Multiple minimally displaced fractures are noted involving the lateral right 4th through 6th ribs.  No other convincing acute osseous abnormality is identified.  Regional degenerative changes are present.    IMPRESSION  1. No acute cardiopulmonary abnormality.  2. Minimally displaced lateral right 4th through 6th rib fractures.  ==========    This report was flagged in Epic as abnormal.      Electronically signed by: Nilson Pompa  Date:    10/14/2022  Time:    17:23                                     Medications   HYDROmorphone (PF) injection 2 mg (2 mg Intramuscular Given 10/14/22 1731)   ondansetron disintegrating tablet 4 mg (4 mg Oral Given 10/14/22 1731)      Medical Decision Making:   Differential Diagnosis:   Ribcage fracture     Clinical Tests:   Radiological Study: Reviewed  ED Management:   patient to control pain to continue with pain medicine as needed anti-inflammatories as needed recommending for a brace and educated patient how to wear the brace and not to sleep with it.  Provided the spirometer.            ED Course as of 10/14/22 1801   Fri Oct 14, 2022   1801 Pain is controlled , patient is doing well  [YG]      ED Course User Index  [YG] WILI West                 Clinical Impression:   Final diagnoses:  [W19.XXXA] Fall  [S22.39XA] Closed traumatic minimally displaced fracture of rib (Primary)        ED Disposition Condition    Discharge Stable          ED Prescriptions       Medication Sig Dispense Start Date End Date Auth. Provider    HYDROcodone-acetaminophen (NORCO)  mg per tablet Take 1 tablet by mouth every 4 (four) hours as needed for Pain. 5 tablet 10/14/2022 -- WILI West          Follow-up Information       Follow up With Specialties Details Why Contact Info    [facility] - Emergency Dept Emergency Medicine  If symptoms worsen [address] [facility] [phone]    [facility]  If symptoms worsen, [address] [facility] [id number] [phone]      [facility] - Emergency Dept Emergency Medicine   [address] [facility] [phone]             WILI West  10/14/22 1802

## 2023-04-28 ENCOUNTER — OFFICE VISIT (OUTPATIENT)
Dept: URGENT CARE | Facility: CLINIC | Age: 63
End: 2023-04-28
Payer: COMMERCIAL

## 2023-04-28 VITALS
DIASTOLIC BLOOD PRESSURE: 70 MMHG | BODY MASS INDEX: 29.82 KG/M2 | RESPIRATION RATE: 17 BRPM | HEART RATE: 90 BPM | TEMPERATURE: 98 F | OXYGEN SATURATION: 97 % | SYSTOLIC BLOOD PRESSURE: 110 MMHG | WEIGHT: 190 LBS | HEIGHT: 67 IN

## 2023-04-28 DIAGNOSIS — H10.12 ALLERGIC CONJUNCTIVITIS OF LEFT EYE: Primary | ICD-10-CM

## 2023-04-28 PROCEDURE — 99213 PR OFFICE/OUTPT VISIT, EST, LEVL III, 20-29 MIN: ICD-10-PCS | Mod: ,,, | Performed by: FAMILY MEDICINE

## 2023-04-28 PROCEDURE — 99213 OFFICE O/P EST LOW 20 MIN: CPT | Mod: ,,, | Performed by: FAMILY MEDICINE

## 2023-04-28 RX ORDER — OFLOXACIN 3 MG/ML
1 SOLUTION/ DROPS OPHTHALMIC 4 TIMES DAILY
Qty: 5 ML | Refills: 0 | Status: SHIPPED | OUTPATIENT
Start: 2023-04-28 | End: 2023-05-01

## 2023-04-28 NOTE — PATIENT INSTRUCTIONS
Discussed the physical finding, condition and course.  Monitor the symptoms.  Medications as directed.    With persistent symptoms will follow-up with ophthalmologist on Monday.  ER precautions with any acute change in symptoms

## 2023-04-28 NOTE — PROGRESS NOTES
"Subjective:      Patient ID: Beth Chaudhary is a 62 y.o. female.    Vitals:  height is 5' 7" (1.702 m) and weight is 86.2 kg (190 lb). Her temperature is 98.3 °F (36.8 °C). Her blood pressure is 110/70 and her pulse is 90. Her respiration is 17 and oxygen saturation is 97%.     Chief Complaint: Eye Problem (Left eye redness - itchy, discharge x 3 days )    HPI:  62-year-old female present to clinic with concerns of left eye redness itchiness and redness since 3 days.  No recent upper or lower respiratory infections.  Does not wear contact lens.  No pain. No change in vision.  No drainage.    ROS :  Constitutional : No fever, no fatigue  Eyes : As per HPI  Neck : Negative except HPI  HENT :  No sore throat, no difficulty swallowing  Respiratory : No shortness of breath, no wheezing  Cardiovascular : No chest pain  Musculoskeletal : Negative except HPI  Integumentary : No rash, no abnormal lesion  Neurological : Negative for tingling numbness and weakness    Objective:     Physical Exam  General : Alert and Oriented, No apparent distress, afebrile  Eyes :   Left eye sclera and conjunctiva mild redness, skin around nasally appears dry mild redness.  No active drainage.  Right sclera and conjunctiva appears normal  Neck - supple  HENT : Oropharynx no redness or swelling.   Respiratory : Bilateral equal breath sounds, nonlabored respirations  Cardiovascular : Rate, rhythm regular, normal volume pulse, no murmur  Integumentary : Warm, Dry and no rash    Assessment:     1. Allergic conjunctivitis of left eye      Plan:     Discussed the physical finding, condition and course.  Monitor the symptoms.  Medications as directed.    With persistent symptoms will follow-up with ophthalmologist on Monday.  ER precautions with any acute change in symptoms    Allergic conjunctivitis of left eye  -     olopatadine (PAZEO) 0.7 % Drop; Place 1 drop into both eyes once daily.  Dispense: 5 mL; Refill: 0  -     ofloxacin (OCUFLOX) 0.3 % " ophthalmic solution; Place 1 drop into the left eye 4 (four) times daily. for 3 days  Dispense: 5 mL; Refill: 0

## 2023-05-15 ENCOUNTER — DOCUMENTATION ONLY (OUTPATIENT)
Dept: INTERNAL MEDICINE | Facility: CLINIC | Age: 63
End: 2023-05-15
Payer: COMMERCIAL

## 2023-05-15 LAB
LEFT EYE DM RETINOPATHY: NEGATIVE
RIGHT EYE DM RETINOPATHY: NEGATIVE

## 2023-07-10 ENCOUNTER — HOSPITAL ENCOUNTER (EMERGENCY)
Facility: HOSPITAL | Age: 63
Discharge: LEFT BEFORE DISCHARGE | End: 2023-07-10
Attending: EMERGENCY MEDICINE
Payer: COMMERCIAL

## 2023-07-10 VITALS
HEIGHT: 67 IN | WEIGHT: 170 LBS | OXYGEN SATURATION: 94 % | HEART RATE: 93 BPM | RESPIRATION RATE: 20 BRPM | SYSTOLIC BLOOD PRESSURE: 101 MMHG | BODY MASS INDEX: 26.68 KG/M2 | DIASTOLIC BLOOD PRESSURE: 68 MMHG | TEMPERATURE: 98 F
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
## 2023-07-10 DIAGNOSIS — R29.6 RECURRENT FALLS: ICD-10-CM

## 2023-07-10 DIAGNOSIS — I95.2 HYPOTENSION DUE TO DRUGS: ICD-10-CM

## 2023-07-10 DIAGNOSIS — Z91.89 AT RISK FOR POLYPHARMACY: ICD-10-CM

## 2023-07-10 DIAGNOSIS — R53.1 WEAKNESS: ICD-10-CM

## 2023-07-10 DIAGNOSIS — S22.42XD: ICD-10-CM

## 2023-07-10 DIAGNOSIS — S22.41XD: ICD-10-CM

## 2023-07-10 DIAGNOSIS — E16.2 HYPOGLYCEMIA: ICD-10-CM

## 2023-07-10 LAB
ALBUMIN SERPL-MCNC: 3.4 G/DL (ref 3.4–4.8)
ALBUMIN/GLOB SERPL: 1 RATIO (ref 1.1–2)
ALP SERPL-CCNC: 110 UNIT/L (ref 40–150)
ALT SERPL-CCNC: 18 UNIT/L (ref 0–55)
AST SERPL-CCNC: 23 UNIT/L (ref 5–34)
BASOPHILS # BLD AUTO: 0.08 X10(3)/MCL
BASOPHILS NFR BLD AUTO: 0.8 %
BILIRUBIN DIRECT+TOT PNL SERPL-MCNC: 0.3 MG/DL
BUN SERPL-MCNC: 22.4 MG/DL (ref 9.8–20.1)
CALCIUM SERPL-MCNC: 9.3 MG/DL (ref 8.4–10.2)
CHLORIDE SERPL-SCNC: 105 MMOL/L (ref 98–107)
CO2 SERPL-SCNC: 24 MMOL/L (ref 23–31)
CREAT SERPL-MCNC: 1.53 MG/DL (ref 0.55–1.02)
EOSINOPHIL # BLD AUTO: 0.29 X10(3)/MCL (ref 0–0.9)
EOSINOPHIL NFR BLD AUTO: 2.7 %
ERYTHROCYTE [DISTWIDTH] IN BLOOD BY AUTOMATED COUNT: 12.6 % (ref 11.5–17)
GFR SERPLBLD CREATININE-BSD FMLA CKD-EPI: 38 MLS/MIN/1.73/M2
GLOBULIN SER-MCNC: 3.3 GM/DL (ref 2.4–3.5)
GLUCOSE SERPL-MCNC: 72 MG/DL (ref 82–115)
HCT VFR BLD AUTO: 41.1 % (ref 37–47)
HGB BLD-MCNC: 13.5 G/DL (ref 12–16)
IMM GRANULOCYTES # BLD AUTO: 0.09 X10(3)/MCL (ref 0–0.04)
IMM GRANULOCYTES NFR BLD AUTO: 0.9 %
LYMPHOCYTES # BLD AUTO: 2.47 X10(3)/MCL (ref 0.6–4.6)
LYMPHOCYTES NFR BLD AUTO: 23.3 %
MCH RBC QN AUTO: 30.3 PG (ref 27–31)
MCHC RBC AUTO-ENTMCNC: 32.8 G/DL (ref 33–36)
MCV RBC AUTO: 92.4 FL (ref 80–94)
MONOCYTES # BLD AUTO: 0.99 X10(3)/MCL (ref 0.1–1.3)
MONOCYTES NFR BLD AUTO: 9.4 %
NEUTROPHILS # BLD AUTO: 6.66 X10(3)/MCL (ref 2.1–9.2)
NEUTROPHILS NFR BLD AUTO: 62.9 %
NRBC BLD AUTO-RTO: 0 %
PLATELET # BLD AUTO: 236 X10(3)/MCL (ref 130–400)
PMV BLD AUTO: 9.7 FL (ref 7.4–10.4)
POCT GLUCOSE: 39 MG/DL (ref 70–110)
POCT GLUCOSE: 80 MG/DL (ref 70–110)
POCT GLUCOSE: 83 MG/DL (ref 70–110)
POTASSIUM SERPL-SCNC: 4.1 MMOL/L (ref 3.5–5.1)
PROT SERPL-MCNC: 6.7 GM/DL (ref 5.8–7.6)
RBC # BLD AUTO: 4.45 X10(6)/MCL (ref 4.2–5.4)
SODIUM SERPL-SCNC: 142 MMOL/L (ref 136–145)
TROPONIN I SERPL-MCNC: <0.01 NG/ML (ref 0–0.04)
WBC # SPEC AUTO: 10.58 X10(3)/MCL (ref 4.5–11.5)

## 2023-07-10 PROCEDURE — 93010 ELECTROCARDIOGRAM REPORT: CPT | Mod: ,,, | Performed by: INTERNAL MEDICINE

## 2023-07-10 PROCEDURE — 85025 COMPLETE CBC W/AUTO DIFF WBC: CPT | Performed by: EMERGENCY MEDICINE

## 2023-07-10 PROCEDURE — 93005 ELECTROCARDIOGRAM TRACING: CPT

## 2023-07-10 PROCEDURE — 96374 THER/PROPH/DIAG INJ IV PUSH: CPT

## 2023-07-10 PROCEDURE — 82962 GLUCOSE BLOOD TEST: CPT

## 2023-07-10 PROCEDURE — 63600175 PHARM REV CODE 636 W HCPCS: Performed by: EMERGENCY MEDICINE

## 2023-07-10 PROCEDURE — 99285 EMERGENCY DEPT VISIT HI MDM: CPT | Mod: 25

## 2023-07-10 PROCEDURE — 93010 EKG 12-LEAD: ICD-10-PCS | Mod: ,,, | Performed by: INTERNAL MEDICINE

## 2023-07-10 PROCEDURE — 25500020 PHARM REV CODE 255: Performed by: EMERGENCY MEDICINE

## 2023-07-10 PROCEDURE — 96375 TX/PRO/DX INJ NEW DRUG ADDON: CPT

## 2023-07-10 PROCEDURE — 84484 ASSAY OF TROPONIN QUANT: CPT | Performed by: EMERGENCY MEDICINE

## 2023-07-10 PROCEDURE — 63600175 PHARM REV CODE 636 W HCPCS: Performed by: STUDENT IN AN ORGANIZED HEALTH CARE EDUCATION/TRAINING PROGRAM

## 2023-07-10 PROCEDURE — 80053 COMPREHEN METABOLIC PANEL: CPT | Performed by: EMERGENCY MEDICINE

## 2023-07-10 PROCEDURE — 25000003 PHARM REV CODE 250: Performed by: EMERGENCY MEDICINE

## 2023-07-10 PROCEDURE — 96376 TX/PRO/DX INJ SAME DRUG ADON: CPT

## 2023-07-10 RX ORDER — HYDROMORPHONE HYDROCHLORIDE 2 MG/ML
1 INJECTION, SOLUTION INTRAMUSCULAR; INTRAVENOUS; SUBCUTANEOUS
Status: COMPLETED | OUTPATIENT
Start: 2023-07-10 | End: 2023-07-10

## 2023-07-10 RX ORDER — ONDANSETRON 2 MG/ML
4 INJECTION INTRAMUSCULAR; INTRAVENOUS
Status: COMPLETED | OUTPATIENT
Start: 2023-07-10 | End: 2023-07-10

## 2023-07-10 RX ORDER — SODIUM CHLORIDE, SODIUM LACTATE, POTASSIUM CHLORIDE, CALCIUM CHLORIDE 600; 310; 30; 20 MG/100ML; MG/100ML; MG/100ML; MG/100ML
1000 INJECTION, SOLUTION INTRAVENOUS
Status: COMPLETED | OUTPATIENT
Start: 2023-07-10 | End: 2023-07-10

## 2023-07-10 RX ORDER — SODIUM CHLORIDE 9 MG/ML
1000 INJECTION, SOLUTION INTRAVENOUS
Status: COMPLETED | OUTPATIENT
Start: 2023-07-10 | End: 2023-07-10

## 2023-07-10 RX ORDER — KETOROLAC TROMETHAMINE 30 MG/ML
30 INJECTION, SOLUTION INTRAMUSCULAR; INTRAVENOUS
Status: COMPLETED | OUTPATIENT
Start: 2023-07-10 | End: 2023-07-10

## 2023-07-10 RX ADMIN — KETOROLAC TROMETHAMINE 30 MG: 30 INJECTION, SOLUTION INTRAMUSCULAR; INTRAVENOUS at 04:07

## 2023-07-10 RX ADMIN — SODIUM CHLORIDE 1000 ML: 9 INJECTION, SOLUTION INTRAVENOUS at 04:07

## 2023-07-10 RX ADMIN — IOPAMIDOL 100 ML: 755 INJECTION, SOLUTION INTRAVENOUS at 07:07

## 2023-07-10 RX ADMIN — DEXTROSE MONOHYDRATE 12.5 G: 25 INJECTION, SOLUTION INTRAVENOUS at 03:07

## 2023-07-10 RX ADMIN — HYDROMORPHONE HYDROCHLORIDE 1 MG: 2 INJECTION INTRAMUSCULAR; INTRAVENOUS; SUBCUTANEOUS at 05:07

## 2023-07-10 RX ADMIN — SODIUM CHLORIDE, POTASSIUM CHLORIDE, SODIUM LACTATE AND CALCIUM CHLORIDE 1000 ML: 600; 310; 30; 20 INJECTION, SOLUTION INTRAVENOUS at 06:07

## 2023-07-10 RX ADMIN — SODIUM CHLORIDE, POTASSIUM CHLORIDE, SODIUM LACTATE AND CALCIUM CHLORIDE 1000 ML: 600; 310; 30; 20 INJECTION, SOLUTION INTRAVENOUS at 09:07

## 2023-07-10 RX ADMIN — ONDANSETRON 4 MG: 2 INJECTION INTRAMUSCULAR; INTRAVENOUS at 02:07

## 2023-07-10 RX ADMIN — HYDROMORPHONE HYDROCHLORIDE 1 MG: 2 INJECTION INTRAMUSCULAR; INTRAVENOUS; SUBCUTANEOUS at 02:07
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
## 2023-08-22 ENCOUNTER — OFFICE VISIT (OUTPATIENT)
Dept: URGENT CARE | Facility: CLINIC | Age: 63
End: 2023-08-22
Payer: COMMERCIAL

## 2023-08-22 VITALS
DIASTOLIC BLOOD PRESSURE: 76 MMHG | HEIGHT: 67 IN | WEIGHT: 170 LBS | BODY MASS INDEX: 26.68 KG/M2 | SYSTOLIC BLOOD PRESSURE: 116 MMHG | OXYGEN SATURATION: 97 % | RESPIRATION RATE: 17 BRPM | HEART RATE: 83 BPM | TEMPERATURE: 98 F

## 2023-08-22 DIAGNOSIS — T14.8XXA OPEN WOUND: ICD-10-CM

## 2023-08-22 DIAGNOSIS — L03.011 CELLULITIS OF FINGER OF RIGHT HAND: Primary | ICD-10-CM

## 2023-08-22 PROCEDURE — 99213 PR OFFICE/OUTPT VISIT, EST, LEVL III, 20-29 MIN: ICD-10-PCS | Mod: ,,, | Performed by: FAMILY MEDICINE

## 2023-08-22 PROCEDURE — 99213 OFFICE O/P EST LOW 20 MIN: CPT | Mod: ,,, | Performed by: FAMILY MEDICINE

## 2023-08-22 RX ORDER — CLINDAMYCIN HYDROCHLORIDE 150 MG/1
300 CAPSULE ORAL EVERY 6 HOURS
Qty: 40 CAPSULE | Refills: 0 | Status: SHIPPED | OUTPATIENT
Start: 2023-08-22 | End: 2023-08-27

## 2023-08-22 RX ORDER — MUPIROCIN 20 MG/G
OINTMENT TOPICAL 3 TIMES DAILY
Qty: 15 G | Refills: 0 | Status: SHIPPED | OUTPATIENT
Start: 2023-08-22

## 2023-08-22 NOTE — PATIENT INSTRUCTIONS
Discussed the physical finding, condition and course.  Monitor the symptoms closely.  Medications as directed.  Encouraged patient to return to clinic on Thursday for repeat evaluation.  ER precautions with any acute change in symptoms  With persistent symptoms may need further evaluation.  Voiced understanding.  Tylenol for pain and discomfort  Call this clinic for any questions

## 2023-08-22 NOTE — PROGRESS NOTES
"Subjective:      Patient ID: Beth Chaudhary is a 63 y.o. female.    Vitals:  height is 5' 7" (1.702 m) and weight is 77.1 kg (170 lb). Her temperature is 97.8 °F (36.6 °C). Her blood pressure is 116/76 and her pulse is 83. Her respiration is 17 and oxygen saturation is 97%.     Chief Complaint: Insect Bite (X 5 days- possible insect bite- rt second digit, redness, swelling to hand, pain )    HPI:  63-year-old female known diabetic present to clinic with concerns of possible insect bite right hand 2nd digit dorsum since 5 days.  Noticing redness swelling and discomfort.  Appears concerned with worsening symptoms.    ROS :  Constitutional : No Fever, No  Chills  Neck : Negative except HPI  Respiratory : Negative for shortness of breath and wheezing  Cardiovascular : Negative for chest pain, No palpitations  Gastrointestinal :  Negative except HPI  Muskeloskeletal : Negative except HPI  Integumentary : As Per HPI  Neurological : No tingling, No numbness, No weakness   Objective:     Physical Exam  General : Alert and oriented, No apparent distress, Afebrile  Neck -: supple, Non Tender  Respiratory :Lungs are clear to auscultate, Breath sounds are equal  Cardiovascular : Normal rate, normal volume pulse bilateral  Musculoskeletal :  Right hand dorsum base of the index finger 1 cm open wound, yellowish discoloration, wide area of redness and swelling at dorsum of the hand.  Tender to palpate.  No palpable crepitus, no pain with passive movements.  Integumentary : Warm, Dry     Assessment:     1. Cellulitis of finger of right hand    2. Open wound      Plan:   Discussed the physical finding, condition and course.  Monitor the symptoms closely.  Medications as directed.  Encouraged patient to return to clinic on Thursday for repeat evaluation.  ER precautions with any acute change in symptoms  With persistent symptoms may need further evaluation.  Voiced understanding.  Tylenol for pain and discomfort  Call this clinic for " any questions    Cellulitis of finger of right hand  -     clindamycin (CLEOCIN) 150 MG capsule; Take 2 capsules (300 mg total) by mouth every 6 (six) hours. for 5 days  Dispense: 40 capsule; Refill: 0  -     mupirocin (BACTROBAN) 2 % ointment; Apply topically 3 (three) times daily.  Dispense: 15 g; Refill: 0    Open wound  -     clindamycin (CLEOCIN) 150 MG capsule; Take 2 capsules (300 mg total) by mouth every 6 (six) hours. for 5 days  Dispense: 40 capsule; Refill: 0  -     mupirocin (BACTROBAN) 2 % ointment; Apply topically 3 (three) times daily.  Dispense: 15 g; Refill: 0

## 2023-09-07 ENCOUNTER — PATIENT MESSAGE (OUTPATIENT)
Dept: RESEARCH | Facility: HOSPITAL | Age: 63
End: 2023-09-07
Payer: COMMERCIAL

## 2023-11-29 ENCOUNTER — OFFICE VISIT (OUTPATIENT)
Dept: SURGERY | Facility: CLINIC | Age: 63
End: 2023-11-29
Payer: COMMERCIAL

## 2023-11-29 VITALS
DIASTOLIC BLOOD PRESSURE: 70 MMHG | HEART RATE: 89 BPM | BODY MASS INDEX: 29.82 KG/M2 | HEIGHT: 67 IN | SYSTOLIC BLOOD PRESSURE: 102 MMHG | WEIGHT: 190 LBS

## 2023-11-29 DIAGNOSIS — K43.9 VENTRAL HERNIA WITHOUT OBSTRUCTION OR GANGRENE: Primary | ICD-10-CM

## 2023-11-29 PROCEDURE — 1159F MED LIST DOCD IN RCRD: CPT | Mod: CPTII,,, | Performed by: SURGERY

## 2023-11-29 PROCEDURE — 4010F PR ACE/ARB THEARPY RXD/TAKEN: ICD-10-PCS | Mod: CPTII,,, | Performed by: SURGERY

## 2023-11-29 PROCEDURE — 3008F PR BODY MASS INDEX (BMI) DOCUMENTED: ICD-10-PCS | Mod: CPTII,,, | Performed by: SURGERY

## 2023-11-29 PROCEDURE — 1160F RVW MEDS BY RX/DR IN RCRD: CPT | Mod: CPTII,,, | Performed by: SURGERY

## 2023-11-29 PROCEDURE — 3078F PR MOST RECENT DIASTOLIC BLOOD PRESSURE < 80 MM HG: ICD-10-PCS | Mod: CPTII,,, | Performed by: SURGERY

## 2023-11-29 PROCEDURE — 3074F SYST BP LT 130 MM HG: CPT | Mod: CPTII,,, | Performed by: SURGERY

## 2023-11-29 PROCEDURE — 3008F BODY MASS INDEX DOCD: CPT | Mod: CPTII,,, | Performed by: SURGERY

## 2023-11-29 PROCEDURE — 3078F DIAST BP <80 MM HG: CPT | Mod: CPTII,,, | Performed by: SURGERY

## 2023-11-29 PROCEDURE — 99213 PR OFFICE/OUTPT VISIT, EST, LEVL III, 20-29 MIN: ICD-10-PCS | Mod: ,,, | Performed by: SURGERY

## 2023-11-29 PROCEDURE — 1160F PR REVIEW ALL MEDS BY PRESCRIBER/CLIN PHARMACIST DOCUMENTED: ICD-10-PCS | Mod: CPTII,,, | Performed by: SURGERY

## 2023-11-29 PROCEDURE — 3074F PR MOST RECENT SYSTOLIC BLOOD PRESSURE < 130 MM HG: ICD-10-PCS | Mod: CPTII,,, | Performed by: SURGERY

## 2023-11-29 PROCEDURE — 1159F PR MEDICATION LIST DOCUMENTED IN MEDICAL RECORD: ICD-10-PCS | Mod: CPTII,,, | Performed by: SURGERY

## 2023-11-29 PROCEDURE — 4010F ACE/ARB THERAPY RXD/TAKEN: CPT | Mod: CPTII,,, | Performed by: SURGERY

## 2023-11-29 PROCEDURE — 99213 OFFICE O/P EST LOW 20 MIN: CPT | Mod: ,,, | Performed by: SURGERY

## 2023-11-29 RX ORDER — VENLAFAXINE HYDROCHLORIDE 225 MG/1
1 TABLET, EXTENDED RELEASE ORAL EVERY MORNING
COMMUNITY

## 2023-11-29 RX ORDER — CLOTRIMAZOLE AND BETAMETHASONE DIPROPIONATE 10; .64 MG/G; MG/G
CREAM TOPICAL
COMMUNITY
Start: 2023-09-18

## 2023-11-29 RX ORDER — PRAVASTATIN SODIUM 80 MG/1
80 TABLET ORAL
COMMUNITY
Start: 2023-10-13

## 2023-11-29 RX ORDER — METOPROLOL SUCCINATE 50 MG/1
50 TABLET, EXTENDED RELEASE ORAL
COMMUNITY

## 2023-11-29 RX ORDER — IBUPROFEN 800 MG/1
TABLET ORAL
COMMUNITY
Start: 2022-12-28 | End: 2024-01-10 | Stop reason: ALTCHOICE

## 2023-11-29 RX ORDER — NITROFURANTOIN 25; 75 MG/1; MG/1
100 CAPSULE ORAL
COMMUNITY
Start: 2023-10-13

## 2023-11-29 RX ORDER — OLMESARTAN MEDOXOMIL 20 MG/1
20 TABLET ORAL
COMMUNITY

## 2023-11-29 RX ORDER — FLUTICASONE PROPIONATE AND SALMETEROL 250; 50 UG/1; UG/1
1 POWDER RESPIRATORY (INHALATION)
COMMUNITY
Start: 2023-07-25 | End: 2024-01-10 | Stop reason: ALTCHOICE

## 2023-11-29 RX ORDER — AMLODIPINE BESYLATE 2.5 MG/1
2.5 TABLET ORAL
COMMUNITY
End: 2024-01-10 | Stop reason: ALTCHOICE

## 2023-11-29 RX ORDER — HYDROXYZINE HYDROCHLORIDE 25 MG/1
25 TABLET, FILM COATED ORAL
COMMUNITY
Start: 2023-10-25

## 2023-12-02 NOTE — PROGRESS NOTES
HISTORY & PHYSICAL  General Surgery    Patient Name: Beth Chaudhary  YOB: 1960    Date: 12/02/2023                     SUBJECTIVE:     Chief Complaint/Reason for Admission: Follow-up (Ventral hernia c/o of discomfort (iv 07/2022))       History of Present Illness:  Ms. Beth Chaudhary is a 63 y.o. female with a history of multiple abdominal surgeries and reports she noticed an increasing abdominal bulge that has been increasing for quite some time. She states bulge has become larger over time and is now causing some discomfort. She denies changes in bladder or bowel habits or nausea/vomiting. She denies chest pain, shortness of breath, fever or chills.     Review of Systems:  12 point ROS negative except as stated in HPI    PAST HISTORY:     Past Medical History:   Diagnosis Date    Arthritis     Asthma     Back pain     Breast cancer, stage 1     lumpectomy    Claustrophobia     Constipation     CPAP (continuous positive airway pressure) dependence     Depression     Diabetes mellitus type I     Fibromyalgia     GERD (gastroesophageal reflux disease)     Hyperlipidemia     Hypotension     IBS (irritable bowel syndrome)     Memory loss     Muscular disorder     Necrotizing fasciitis     Osteoporosis     Panic attack     Personal history of colonic polyps 12/21/2018    Sleep apnea, unspecified     Urinary frequency     Ventral hernia     Weakness      Past Surgical History:   Procedure Laterality Date    APPENDECTOMY      CHOLECYSTECTOMY      COLONOSCOPY N/A 12/21/2018    FACIAL RECONSTRUCTION SURGERY      LUMPECTOMY, BREAST Left     necrotizing fasciitis      TONSILLECTOMY      VENTRAL HERNIA REPAIR  03/03/2020     Family History   Problem Relation Age of Onset    Heart disease Mother     Cancer Father     Thyroid disease Sister     No Known Problems Brother     No Known Problems Daughter     Diabetes Maternal Grandmother     Diabetes Maternal Grandfather     Diabetes Paternal Grandmother     Diabetes  Paternal Grandfather      Social History     Socioeconomic History    Marital status:    Tobacco Use    Smoking status: Former     Passive exposure: Never    Smokeless tobacco: Never   Substance and Sexual Activity    Alcohol use: Not Currently    Drug use: Never       MEDICATIONS & ALLERGIES:     Current Outpatient Medications on File Prior to Visit   Medication Sig    albuterol (PROVENTIL/VENTOLIN HFA) 90 mcg/actuation inhaler Inhale 2 puffs into the lungs every 6 (six) hours as needed.    alpha lipoic acid 300 mg Cap Take 1 capsule by mouth every morning.    amLODIPine (NORVASC) 2.5 MG tablet Take 2.5 mg by mouth.    cholecalciferol, vitamin D3, (VITAMIN D3) 25 mcg (1,000 unit) capsule Take 2,500 Units by mouth.    clotrimazole-betamethasone 1-0.05% (LOTRISONE) cream SMARTSIG:sparingly Topical Twice Daily PRN    fluticasone propionate (FLONASE) 50 mcg/actuation nasal spray     fluticasone-salmeterol diskus inhaler 250-50 mcg Inhale 1 puff into the lungs.    hydrOXYzine HCL (ATARAX) 25 MG tablet Take 25 mg by mouth.    ibuprofen (ADVIL,MOTRIN) 800 MG tablet TAKE 1 TABLET BY MOUTH THREE TIMES DAILY FOR UP TO 10 DAYS AS NEEDED FOR PAIN    insulin NPH-insulin regular, 70/30, 100 unit/mL (70-30) injection Inject into the skin 2 (two) times daily.    LORazepam (ATIVAN) 2 MG Tab Take 2 mg by mouth every 8 (eight) hours as needed.    metoprolol succinate (TOPROL-XL) 50 MG 24 hr tablet Take 50 mg by mouth.    mupirocin (BACTROBAN) 2 % ointment Apply topically 3 (three) times daily.    nitrofurantoin, macrocrystal-monohydrate, (MACROBID) 100 MG capsule Take 100 mg by mouth.    olmesartan (BENICAR) 20 MG tablet Take 20 mg by mouth.    pravastatin (PRAVACHOL) 80 MG tablet Take 80 mg by mouth.    venlafaxine 225 mg TR24 Take 1 tablet by mouth every morning.     No current facility-administered medications on file prior to visit.     Review of patient's allergies indicates:   Allergen Reactions    Gabapentin      Other  "reaction(s): Hives    Morphine      Other reaction(s): morphine  Other reaction(s): morphine      Sulfa (sulfonamide antibiotics)     Codeine Rash         OBJECTIVE:   Visit Vitals  /70 (BP Location: Left arm, Patient Position: Sitting)   Pulse 89   Ht 5' 7" (1.702 m)   Wt 86.2 kg (190 lb)   BMI 29.76 kg/m²       Physical Exam:  General:  Well developed, well nourished, no acute distress  HEENT:  Normocephalic, atraumatic, PERRL, EOMI, clear sclera, ears normal, neck supple, throat clear without erythema or exudates  CVS:  RRR, S1 and S2 normal, no murmurs, rubs, gallops  Resp:  Lungs clear to auscultation, no wheezes, rales, rhonchi, cough  GI:  Abdomen soft, non-tender, non-distended, normoactive bowel sounds, no masses.   + reducible ventral hernia, NTTP  :  Deferred  MSK:  No muscle atrophy, cyanosis, peripheral edema, full range of motion  Skin:  No rashes, ulcers, erythema  Neuro:  CNII-XII grossly intact  Psych:  Alert and oriented to person, place, and time    Results:  I have independently reviewed all pertinent lab (CBC, CMP) and radiologic studies (CT) relevant to general/bariatric surgery.    VISIT DIAGNOSES:       ICD-10-CM ICD-9-CM   1. Ventral hernia without obstruction or gangrene  K43.9 553.20        ASSESSMENT/PLAN:     64 yo female with Ventral Hernia    -  Discussed with the patient that I would like her to lose some weight prior to surgical intervtion.  Will refer her to dietary for counseling and f/u after  -  Plan for Open Ventral Hernia Repair early next year        "

## 2023-12-27 ENCOUNTER — TELEPHONE (OUTPATIENT)
Dept: SURGERY | Facility: CLINIC | Age: 63
End: 2023-12-27

## 2024-01-10 ENCOUNTER — OFFICE VISIT (OUTPATIENT)
Dept: SURGERY | Facility: CLINIC | Age: 64
End: 2024-01-10
Payer: COMMERCIAL

## 2024-01-10 VITALS
HEIGHT: 67 IN | HEART RATE: 80 BPM | SYSTOLIC BLOOD PRESSURE: 92 MMHG | BODY MASS INDEX: 29.88 KG/M2 | DIASTOLIC BLOOD PRESSURE: 57 MMHG | WEIGHT: 190.38 LBS

## 2024-01-10 DIAGNOSIS — K43.9 VENTRAL HERNIA WITHOUT OBSTRUCTION OR GANGRENE: Primary | ICD-10-CM

## 2024-01-10 PROCEDURE — 3074F SYST BP LT 130 MM HG: CPT | Mod: CPTII,,, | Performed by: SURGERY

## 2024-01-10 PROCEDURE — 3078F DIAST BP <80 MM HG: CPT | Mod: CPTII,,, | Performed by: SURGERY

## 2024-01-10 PROCEDURE — 1160F RVW MEDS BY RX/DR IN RCRD: CPT | Mod: CPTII,,, | Performed by: SURGERY

## 2024-01-10 PROCEDURE — 1159F MED LIST DOCD IN RCRD: CPT | Mod: CPTII,,, | Performed by: SURGERY

## 2024-01-10 PROCEDURE — 99213 OFFICE O/P EST LOW 20 MIN: CPT | Mod: ,,, | Performed by: SURGERY

## 2024-01-10 PROCEDURE — 4010F ACE/ARB THERAPY RXD/TAKEN: CPT | Mod: CPTII,,, | Performed by: SURGERY

## 2024-01-10 PROCEDURE — 3008F BODY MASS INDEX DOCD: CPT | Mod: CPTII,,, | Performed by: SURGERY

## 2024-01-10 RX ORDER — INSULIN GLARGINE 300 U/ML
INJECTION, SOLUTION SUBCUTANEOUS
COMMUNITY
Start: 2023-12-05

## 2024-01-10 RX ORDER — INSULIN ASPART INJECTION 100 [IU]/ML
INJECTION, SOLUTION SUBCUTANEOUS
COMMUNITY
Start: 2023-12-06

## 2024-01-10 RX ORDER — BLOOD-GLUCOSE SENSOR
EACH MISCELLANEOUS
COMMUNITY
Start: 2023-12-18

## 2024-05-15 ENCOUNTER — TELEPHONE (OUTPATIENT)
Dept: SURGERY | Facility: CLINIC | Age: 64
End: 2024-05-15
Payer: COMMERCIAL

## 2024-05-15 NOTE — TELEPHONE ENCOUNTER
----- Message from Sushma Gaines sent at 5/15/2024  3:20 PM CDT -----  Pt called to schedule a follow up appt for her ventral hernia- she stated it is growing and hurting her- she said she had a ct scan about 1 month ago at the radiology clinic? She said its right next to Dr Matthew Wheeler's office -

## 2024-06-05 ENCOUNTER — OFFICE VISIT (OUTPATIENT)
Dept: SURGERY | Facility: CLINIC | Age: 64
End: 2024-06-05
Payer: COMMERCIAL

## 2024-06-05 VITALS
BODY MASS INDEX: 29.35 KG/M2 | HEIGHT: 67 IN | HEART RATE: 142 BPM | WEIGHT: 187 LBS | SYSTOLIC BLOOD PRESSURE: 106 MMHG | DIASTOLIC BLOOD PRESSURE: 63 MMHG

## 2024-06-05 DIAGNOSIS — K43.6 INCARCERATED VENTRAL HERNIA: Primary | ICD-10-CM

## 2024-06-05 PROCEDURE — 3008F BODY MASS INDEX DOCD: CPT | Mod: CPTII,,, | Performed by: SURGERY

## 2024-06-05 PROCEDURE — 99214 OFFICE O/P EST MOD 30 MIN: CPT | Mod: ,,, | Performed by: SURGERY

## 2024-06-05 PROCEDURE — 1160F RVW MEDS BY RX/DR IN RCRD: CPT | Mod: CPTII,,, | Performed by: SURGERY

## 2024-06-05 PROCEDURE — 3078F DIAST BP <80 MM HG: CPT | Mod: CPTII,,, | Performed by: SURGERY

## 2024-06-05 PROCEDURE — 3074F SYST BP LT 130 MM HG: CPT | Mod: CPTII,,, | Performed by: SURGERY

## 2024-06-05 PROCEDURE — 1159F MED LIST DOCD IN RCRD: CPT | Mod: CPTII,,, | Performed by: SURGERY

## 2024-06-05 PROCEDURE — 4010F ACE/ARB THERAPY RXD/TAKEN: CPT | Mod: CPTII,,, | Performed by: SURGERY

## 2024-06-05 NOTE — PROGRESS NOTES
HISTORY & PHYSICAL  General Surgery    Patient Name: Beth Chaudhary  YOB: 1960    Date: 06/07/2024                     SUBJECTIVE:     Chief Complaint/Reason for Admission: Follow-up (Re eval Ventral Hernia - pt states it is growing and is painful)       History of Present Illness:  Ms. Beth Chaudhary is a 63 y.o. female with a history of strenuous activity and reports she noticed abdominal bulge that has been present for quite some time. She states bulge has become larger over time and is now causing some discomfort. She denies changes in bladder or bowel habits or nausea/vomiting. She denies chest pain, shortness of breath, fever or chills.     Review of Systems:  12 point ROS negative except as stated in HPI    PAST HISTORY:     Past Medical History:   Diagnosis Date    Abdominal pain     Arthritis     Asthma     Back pain     Breast cancer, stage 1     lumpectomy    Breast pain     Chronic back pain     Claustrophobia     Constipation     CPAP (continuous positive airway pressure) dependence     Depression     Diabetes mellitus type I     Disorder of muscle     Dyspnea     Encounter for blood transfusion     Fibromyalgia     GERD (gastroesophageal reflux disease)     Hx of urinary tract infection     Hypercholesterolemia     Hyperlipidemia     Hypertension     Hypotension     IBS (irritable bowel syndrome)     Memory loss     Muscular disorder     Necrotizing fasciitis     Osteoporosis     Panic attack     Personal history of colonic polyps 12/21/2018    Sleep apnea, unspecified     Urinary frequency     Ventral hernia     Weakness      Past Surgical History:   Procedure Laterality Date    APPENDECTOMY      CHOLECYSTECTOMY      COLONOSCOPY N/A 12/21/2018    FACIAL RECONSTRUCTION SURGERY      LUMPECTOMY, BREAST Left     necrotizing fasciitis      TONSILLECTOMY      VENTRAL HERNIA REPAIR  03/03/2020     Family History   Problem Relation Name Age of Onset    Heart disease Mother      Cancer Father       Thyroid disease Sister      No Known Problems Brother      No Known Problems Daughter      Diabetes Maternal Grandmother      Diabetes Maternal Grandfather      Diabetes Paternal Grandmother      Diabetes Paternal Grandfather       Social History     Socioeconomic History    Marital status:    Tobacco Use    Smoking status: Former     Passive exposure: Never    Smokeless tobacco: Never   Substance and Sexual Activity    Alcohol use: Not Currently    Drug use: Never    Sexual activity: Not Currently       MEDICATIONS & ALLERGIES:     Current Outpatient Medications on File Prior to Visit   Medication Sig    albuterol (PROVENTIL/VENTOLIN HFA) 90 mcg/actuation inhaler Inhale 2 puffs into the lungs every 6 (six) hours as needed.    alpha lipoic acid 300 mg Cap Take 1 capsule by mouth every morning.    cholecalciferol, vitamin D3, (VITAMIN D3) 25 mcg (1,000 unit) capsule Take 2,500 Units by mouth.    clotrimazole-betamethasone 1-0.05% (LOTRISONE) cream SMARTSIG:sparingly Topical Twice Daily PRN    DEXCOM G7 SENSOR Rowena SMARTSI Topical Every 10 Days    FIASP FLEXTOUCH U-100 INSULIN 100 unit/mL (3 mL) InPn SMARTSIG:10 Unit(s) SUB-Q 3 Times Daily    fluticasone propionate (FLONASE) 50 mcg/actuation nasal spray     hydrOXYzine HCL (ATARAX) 25 MG tablet Take 25 mg by mouth.    insulin NPH-insulin regular, 70/30, 100 unit/mL (70-30) injection Inject into the skin 2 (two) times daily.    LORazepam (ATIVAN) 2 MG Tab Take 2 mg by mouth every 8 (eight) hours as needed.    metoprolol succinate (TOPROL-XL) 50 MG 24 hr tablet Take 50 mg by mouth.    mupirocin (BACTROBAN) 2 % ointment Apply topically 3 (three) times daily.    nitrofurantoin, macrocrystal-monohydrate, (MACROBID) 100 MG capsule Take 100 mg by mouth.    olmesartan (BENICAR) 20 MG tablet Take 20 mg by mouth.    pravastatin (PRAVACHOL) 80 MG tablet Take 80 mg by mouth.    TOUJEO SOLOSTAR U-300 INSULIN 300 unit/mL (1.5 mL) InPn pen SMARTSI Unit(s) SUB-Q  "Daily    venlafaxine 225 mg TR24 Take 1 tablet by mouth every morning.     No current facility-administered medications on file prior to visit.     Review of patient's allergies indicates:   Allergen Reactions    Gabapentin      Other reaction(s): Hives    Morphine      Other reaction(s): morphine  Other reaction(s): morphine      Sulfa (sulfonamide antibiotics)     Codeine Rash         OBJECTIVE:   Visit Vitals  /63   Pulse (!) 142   Ht 5' 7" (1.702 m)   Wt 84.8 kg (187 lb)   BMI 29.29 kg/m²       Physical Exam:  General:  Well developed, well nourished, no acute distress  HEENT:  Normocephalic, atraumatic, PERRL, EOMI, clear sclera, ears normal, neck supple, throat clear without erythema or exudates  CVS:  RRR, S1 and S2 normal, no murmurs, rubs, gallops  Resp:  Lungs clear to auscultation, no wheezes, rales, rhonchi, cough  GI:  Abdomen soft, non-tender, non-distended, normoactive bowel sounds, no masses.   + Incarcerated ventral hernia, NTTP  :  Deferred  MSK:  No muscle atrophy, cyanosis, peripheral edema, full range of motion  Skin:  No rashes, ulcers, erythema  Neuro:  CNII-XII grossly intact  Psych:  Alert and oriented to person, place, and time    Results:  I have independently reviewed all pertinent lab (CBC, CMP) and radiologic studies (CT, US) relevant to general/bariatric surgery.    CT - Incarcerated Ventral Hernia with involved Colon    VISIT DIAGNOSES:       ICD-10-CM ICD-9-CM   1. Incarcerated ventral hernia  K43.6 552.20        ASSESSMENT/PLAN:     64 yo female with Incarcerated Ventral Hernia (Chronic with Exacerbation)    I had a long discussion with the patient in regards to findings.  She has had multiple abdominal surgeries in the past and is at high risk of complications secondary to this.  She voiced understanding.    - Open Incarcerated Ventral Hernia Repair    - Preoperative workup as ordered       "

## 2024-06-07 DIAGNOSIS — K43.6 INCARCERATED VENTRAL HERNIA: Primary | ICD-10-CM

## 2024-06-07 RX ORDER — ENOXAPARIN SODIUM 100 MG/ML
40 INJECTION SUBCUTANEOUS EVERY 24 HOURS
OUTPATIENT
Start: 2024-06-07

## 2024-06-07 RX ORDER — SODIUM CHLORIDE, SODIUM LACTATE, POTASSIUM CHLORIDE, CALCIUM CHLORIDE 600; 310; 30; 20 MG/100ML; MG/100ML; MG/100ML; MG/100ML
INJECTION, SOLUTION INTRAVENOUS CONTINUOUS
OUTPATIENT
Start: 2024-06-07

## 2024-07-10 ENCOUNTER — TELEPHONE (OUTPATIENT)
Dept: SURGERY | Facility: CLINIC | Age: 64
End: 2024-07-10
Payer: COMMERCIAL

## 2024-07-10 NOTE — TELEPHONE ENCOUNTER
Surgeon:  Dr. Yao Singletary   Procedure:  Lap Ventral Hernia Repair   Procedure Date:  07/23/24  Patient Concerns:  pt called stating that she doesn't feel very well feels dehydrated and constipated  She is nauseous anything she eats of drinks anything     Advised hydration drinks and a bland diet, also stool softener for constipation     Pt voiced understanding and will call if she needs     Also stated that she will try to do her pre op work up this week

## 2024-07-11 DIAGNOSIS — Z01.818 PREOP EXAMINATION: Primary | ICD-10-CM

## 2024-07-15 ENCOUNTER — ANESTHESIA EVENT (OUTPATIENT)
Dept: SURGERY | Facility: HOSPITAL | Age: 64
End: 2024-07-15
Payer: COMMERCIAL

## 2024-07-15 ENCOUNTER — HOSPITAL ENCOUNTER (OUTPATIENT)
Dept: RADIOLOGY | Facility: HOSPITAL | Age: 64
Discharge: HOME | End: 2024-07-15
Attending: SURGERY
Payer: COMMERCIAL

## 2024-07-15 DIAGNOSIS — Z01.818 PREOP EXAMINATION: ICD-10-CM

## 2024-07-15 PROCEDURE — 71045 X-RAY EXAM CHEST 1 VIEW: CPT | Mod: TC

## 2024-07-17 ENCOUNTER — TELEPHONE (OUTPATIENT)
Dept: SURGERY | Facility: CLINIC | Age: 64
End: 2024-07-17
Payer: COMMERCIAL

## 2024-07-22 ENCOUNTER — TELEPHONE (OUTPATIENT)
Dept: SURGERY | Facility: CLINIC | Age: 64
End: 2024-07-22
Payer: COMMERCIAL

## 2024-07-22 RX ORDER — INSULIN GLARGINE 100 [IU]/ML
43 INJECTION, SOLUTION SUBCUTANEOUS NIGHTLY
COMMUNITY

## 2024-07-22 RX ORDER — INSULIN LISPRO-AABC 100 [IU]/ML
10 INJECTION, SOLUTION SUBCUTANEOUS
COMMUNITY
Start: 2024-05-19

## 2024-07-22 NOTE — TELEPHONE ENCOUNTER
Advised pt preop nurse will discuss with her when they call this afternoon. Pt voiced understanding

## 2024-07-22 NOTE — PRE-PROCEDURE INSTRUCTIONS
"[facility]: Outpatient Surgery  Preprocedure Check-In Instructions     Your arrival time for your surgery or procedure is __10:30am____.  We ask patients to arrive about 2 hours before surgery to allow for enough time to review your health history & medications, start your IV, complete any outstanding labwork or tests, and meet your Anesthesiologist.    Expectations: "Because of inconsistent procedure completion times, an unexpected wait may occur. The Physicians would like you to be here to prepare in the event they run ahead of time. We will make you as comfortable as possible and keep you informed. We apologize in advance if this happens."    You will arrive at [facility], [address], [place].  Enter through the [facility] entrance next to the Emergency Room, and come to the 6th floor to the Outpatient Surgery Department.     Visitory Policy:  You are allowed 2 adult visitors to be with you in the hospital. All hospital visitors should be in good current health.  No small children.     What to Bring:  Please have your ID, insurance cards, and all home medication bottles with you at check in.  Bring your CPAP machine if one is used at home.     Fasting:  Nothing to eat or drink after midnight the night before your procedure. This includes no ice, gum, hard candies, and/or tobacco products.  Follow your doctor's instructions for taking any medications on the morning of your procedure.  If no instructions for taking medications were given, do not take any medications but bring your medications in their bottles to your procedure check in.     Follow your doctor's preoperative instructions regarding skin prep, bowel prep, bathing, or showering prior to your procedure.  If any special soaps were provided to you, please use according to your doctor's instructions. If no instructions were given from your doctor, take a good bath or shower with antibacterial soap the night " before and the morning of your procedure.  On the morning of procedure, wear loose, comfortable clothing.  No lotions, makeup, perfumes, colognes, deodorant, or jewelry to your procedure.  Removable items (glasses, contact lenses, dentures, retainers, hearing aids) need to be removed for your procedure.  Bring your storage containers for these items if you wear them.     Artificial nails, body jewelry, eyelash extensions, and/or hair extensions with metal clips are not allowed during your surgery.  If you currently wear any of these items, please arrange for them to be removed prior to your arrival to the hospital.     Outpatient or Same Day Surgeries:  Any patients receiving sedation/anesthesia are advised not to drive for 24 hours after their procedure.  We do not allow patients to drive themselves home after discharge.  If you are going home after your procedure, please have someone available to drive you home from the hospital.        You may call the Outpatient Surgery Department at [phone] with any questions or concerns.  We are looking forward to meeting you and taking great care of you for your procedure.  Thank you for choosing [facility] for your surgical needs.

## 2024-07-23 ENCOUNTER — HOSPITAL ENCOUNTER (INPATIENT)
Facility: HOSPITAL | Age: 64
LOS: 8 days | Discharge: HOME | DRG: 394 | End: 2024-07-31
Attending: SURGERY | Admitting: SURGERY
Payer: COMMERCIAL

## 2024-07-23 ENCOUNTER — ANESTHESIA (OUTPATIENT)
Dept: SURGERY | Facility: HOSPITAL | Age: 64
End: 2024-07-23
Payer: COMMERCIAL

## 2024-07-23 DIAGNOSIS — K43.6 INCARCERATED VENTRAL HERNIA: ICD-10-CM

## 2024-07-23 LAB
POCT GLUCOSE: 118 MG/DL (ref 70–110)
POCT GLUCOSE: 196 MG/DL (ref 70–110)
POCT GLUCOSE: 64 MG/DL (ref 70–110)

## 2024-07-23 PROCEDURE — 63600175 PHARM REV CODE 636 W HCPCS

## 2024-07-23 PROCEDURE — 37000009 HC ANESTHESIA EA ADD 15 MINS: Performed by: SURGERY

## 2024-07-23 PROCEDURE — 25000003 PHARM REV CODE 250

## 2024-07-23 PROCEDURE — 99223 1ST HOSP IP/OBS HIGH 75: CPT | Mod: ,,, | Performed by: SURGERY

## 2024-07-23 PROCEDURE — 25000003 PHARM REV CODE 250: Performed by: ANESTHESIOLOGY

## 2024-07-23 PROCEDURE — 71000033 HC RECOVERY, INTIAL HOUR: Performed by: SURGERY

## 2024-07-23 PROCEDURE — 11000001 HC ACUTE MED/SURG PRIVATE ROOM

## 2024-07-23 PROCEDURE — 63600175 PHARM REV CODE 636 W HCPCS: Performed by: ANESTHESIOLOGY

## 2024-07-23 PROCEDURE — 71000039 HC RECOVERY, EACH ADD'L HOUR: Performed by: SURGERY

## 2024-07-23 PROCEDURE — 49618 RPR AA HRN RCR > 10 NCR/STRN: CPT | Mod: AS,,,

## 2024-07-23 PROCEDURE — C9290 INJ, BUPIVACAINE LIPOSOME: HCPCS | Performed by: SURGERY

## 2024-07-23 PROCEDURE — 21400001 HC TELEMETRY ROOM

## 2024-07-23 PROCEDURE — 49618 RPR AA HRN RCR > 10 NCR/STRN: CPT | Mod: ,,, | Performed by: SURGERY

## 2024-07-23 PROCEDURE — 37000008 HC ANESTHESIA 1ST 15 MINUTES: Performed by: SURGERY

## 2024-07-23 PROCEDURE — 27000221 HC OXYGEN, UP TO 24 HOURS

## 2024-07-23 PROCEDURE — 25000003 PHARM REV CODE 250: Performed by: SURGERY

## 2024-07-23 PROCEDURE — 82962 GLUCOSE BLOOD TEST: CPT | Performed by: SURGERY

## 2024-07-23 PROCEDURE — P9047 ALBUMIN (HUMAN), 25%, 50ML: HCPCS | Mod: JZ,JG | Performed by: ANESTHESIOLOGY

## 2024-07-23 PROCEDURE — 63600175 PHARM REV CODE 636 W HCPCS: Mod: JZ,JG | Performed by: SURGERY

## 2024-07-23 PROCEDURE — 36000706: Performed by: SURGERY

## 2024-07-23 PROCEDURE — 0WQF0ZZ REPAIR ABDOMINAL WALL, OPEN APPROACH: ICD-10-PCS | Performed by: SURGERY

## 2024-07-23 PROCEDURE — 36000707: Performed by: SURGERY

## 2024-07-23 RX ORDER — HYDROMORPHONE HYDROCHLORIDE 2 MG/ML
0.2 INJECTION, SOLUTION INTRAMUSCULAR; INTRAVENOUS; SUBCUTANEOUS EVERY 5 MIN PRN
Status: DISCONTINUED | OUTPATIENT
Start: 2024-07-23 | End: 2024-07-23

## 2024-07-23 RX ORDER — PROCHLORPERAZINE EDISYLATE 5 MG/ML
5 INJECTION INTRAMUSCULAR; INTRAVENOUS EVERY 4 HOURS PRN
Status: DISCONTINUED | OUTPATIENT
Start: 2024-07-23 | End: 2024-07-31 | Stop reason: HOSPADM

## 2024-07-23 RX ORDER — NALOXONE HCL 0.4 MG/ML
0.02 VIAL (ML) INJECTION
Status: DISCONTINUED | OUTPATIENT
Start: 2024-07-23 | End: 2024-07-31 | Stop reason: HOSPADM

## 2024-07-23 RX ORDER — ONDANSETRON HYDROCHLORIDE 2 MG/ML
4 INJECTION, SOLUTION INTRAVENOUS EVERY 4 HOURS PRN
Status: DISCONTINUED | OUTPATIENT
Start: 2024-07-23 | End: 2024-07-31 | Stop reason: HOSPADM

## 2024-07-23 RX ORDER — BUPIVACAINE HYDROCHLORIDE 2.5 MG/ML
INJECTION, SOLUTION EPIDURAL; INFILTRATION; INTRACAUDAL
Status: DISCONTINUED | OUTPATIENT
Start: 2024-07-23 | End: 2024-07-23 | Stop reason: HOSPADM

## 2024-07-23 RX ORDER — METOCLOPRAMIDE HYDROCHLORIDE 5 MG/ML
10 INJECTION INTRAMUSCULAR; INTRAVENOUS ONCE
Status: COMPLETED | OUTPATIENT
Start: 2024-07-23 | End: 2024-07-23

## 2024-07-23 RX ORDER — ENOXAPARIN SODIUM 100 MG/ML
40 INJECTION SUBCUTANEOUS DAILY
Status: DISCONTINUED | OUTPATIENT
Start: 2024-07-24 | End: 2024-07-24

## 2024-07-23 RX ORDER — GLUCAGON 1 MG
1 KIT INJECTION
Status: DISCONTINUED | OUTPATIENT
Start: 2024-07-23 | End: 2024-07-23

## 2024-07-23 RX ORDER — KETOROLAC TROMETHAMINE 30 MG/ML
30 INJECTION, SOLUTION INTRAMUSCULAR; INTRAVENOUS EVERY 6 HOURS
Status: DISPENSED | OUTPATIENT
Start: 2024-07-23 | End: 2024-07-26

## 2024-07-23 RX ORDER — GLYCOPYRROLATE 0.2 MG/ML
INJECTION INTRAMUSCULAR; INTRAVENOUS
Status: DISCONTINUED | OUTPATIENT
Start: 2024-07-23 | End: 2024-07-23

## 2024-07-23 RX ORDER — LIDOCAINE HYDROCHLORIDE 10 MG/ML
1 INJECTION, SOLUTION EPIDURAL; INFILTRATION; INTRACAUDAL; PERINEURAL ONCE
Status: DISCONTINUED | OUTPATIENT
Start: 2024-07-23 | End: 2024-07-23 | Stop reason: HOSPADM

## 2024-07-23 RX ORDER — ONDANSETRON HYDROCHLORIDE 2 MG/ML
INJECTION, SOLUTION INTRAVENOUS
Status: DISCONTINUED | OUTPATIENT
Start: 2024-07-23 | End: 2024-07-23

## 2024-07-23 RX ORDER — SODIUM CHLORIDE, SODIUM LACTATE, POTASSIUM CHLORIDE, CALCIUM CHLORIDE 600; 310; 30; 20 MG/100ML; MG/100ML; MG/100ML; MG/100ML
INJECTION, SOLUTION INTRAVENOUS CONTINUOUS
Status: DISCONTINUED | OUTPATIENT
Start: 2024-07-23 | End: 2024-07-27

## 2024-07-23 RX ORDER — HYDROMORPHONE HCL IN 0.9% NACL 6 MG/30 ML
PATIENT CONTROLLED ANALGESIA SYRINGE INTRAVENOUS CONTINUOUS
Status: DISCONTINUED | OUTPATIENT
Start: 2024-07-23 | End: 2024-07-23

## 2024-07-23 RX ORDER — ONDANSETRON 4 MG/1
4 TABLET, ORALLY DISINTEGRATING ORAL EVERY 4 HOURS PRN
Status: DISCONTINUED | OUTPATIENT
Start: 2024-07-23 | End: 2024-07-31 | Stop reason: HOSPADM

## 2024-07-23 RX ORDER — FAMOTIDINE 10 MG/ML
20 INJECTION INTRAVENOUS ONCE
Status: COMPLETED | OUTPATIENT
Start: 2024-07-23 | End: 2024-07-23

## 2024-07-23 RX ORDER — MUPIROCIN 20 MG/G
OINTMENT TOPICAL 2 TIMES DAILY
Status: DISPENSED | OUTPATIENT
Start: 2024-07-23 | End: 2024-07-28

## 2024-07-23 RX ORDER — MIDAZOLAM HYDROCHLORIDE 1 MG/ML
INJECTION INTRAMUSCULAR; INTRAVENOUS
Status: DISCONTINUED | OUTPATIENT
Start: 2024-07-23 | End: 2024-07-23

## 2024-07-23 RX ORDER — HYDROMORPHONE HCL IN 0.9% NACL 6 MG/30 ML
PATIENT CONTROLLED ANALGESIA SYRINGE INTRAVENOUS CONTINUOUS
Status: CANCELLED | OUTPATIENT
Start: 2024-07-23

## 2024-07-23 RX ORDER — CLONIDINE 0.1 MG/24H
1 PATCH, EXTENDED RELEASE TRANSDERMAL ONCE AS NEEDED
Status: DISCONTINUED | OUTPATIENT
Start: 2024-07-23 | End: 2024-07-31 | Stop reason: HOSPADM

## 2024-07-23 RX ORDER — ROCURONIUM BROMIDE 10 MG/ML
INJECTION, SOLUTION INTRAVENOUS
Status: DISCONTINUED | OUTPATIENT
Start: 2024-07-23 | End: 2024-07-23

## 2024-07-23 RX ORDER — PHENYLEPHRINE HCL IN 0.9% NACL 1 MG/10 ML
SYRINGE (ML) INTRAVENOUS
Status: DISCONTINUED | OUTPATIENT
Start: 2024-07-23 | End: 2024-07-23

## 2024-07-23 RX ORDER — SODIUM CHLORIDE, SODIUM LACTATE, POTASSIUM CHLORIDE, CALCIUM CHLORIDE 600; 310; 30; 20 MG/100ML; MG/100ML; MG/100ML; MG/100ML
INJECTION, SOLUTION INTRAVENOUS CONTINUOUS
Status: DISCONTINUED | OUTPATIENT
Start: 2024-07-23 | End: 2024-07-30 | Stop reason: DRUGHIGH

## 2024-07-23 RX ORDER — FENTANYL CITRATE 50 UG/ML
INJECTION, SOLUTION INTRAMUSCULAR; INTRAVENOUS
Status: DISCONTINUED | OUTPATIENT
Start: 2024-07-23 | End: 2024-07-23

## 2024-07-23 RX ORDER — PROPOFOL 10 MG/ML
VIAL (ML) INTRAVENOUS
Status: DISCONTINUED | OUTPATIENT
Start: 2024-07-23 | End: 2024-07-23

## 2024-07-23 RX ORDER — PANTOPRAZOLE SODIUM 40 MG/10ML
40 INJECTION, POWDER, LYOPHILIZED, FOR SOLUTION INTRAVENOUS DAILY
Status: DISCONTINUED | OUTPATIENT
Start: 2024-07-23 | End: 2024-07-31 | Stop reason: HOSPADM

## 2024-07-23 RX ORDER — ACETAMINOPHEN 500 MG
1000 TABLET ORAL EVERY 8 HOURS
Status: DISCONTINUED | OUTPATIENT
Start: 2024-07-24 | End: 2024-07-31 | Stop reason: HOSPADM

## 2024-07-23 RX ORDER — ALBUMIN HUMAN 250 G/1000ML
12.5 SOLUTION INTRAVENOUS ONCE
Status: DISCONTINUED | OUTPATIENT
Start: 2024-07-23 | End: 2024-07-23

## 2024-07-23 RX ORDER — ENOXAPARIN SODIUM 100 MG/ML
40 INJECTION SUBCUTANEOUS ONCE
Status: COMPLETED | OUTPATIENT
Start: 2024-07-23 | End: 2024-07-23

## 2024-07-23 RX ORDER — LIDOCAINE HYDROCHLORIDE 20 MG/ML
INJECTION INTRAVENOUS
Status: DISCONTINUED | OUTPATIENT
Start: 2024-07-23 | End: 2024-07-23

## 2024-07-23 RX ORDER — ENOXAPARIN SODIUM 100 MG/ML
40 INJECTION SUBCUTANEOUS ONCE
Status: DISCONTINUED | OUTPATIENT
Start: 2024-07-23 | End: 2024-07-23

## 2024-07-23 RX ORDER — MEPERIDINE HYDROCHLORIDE 25 MG/ML
12.5 INJECTION INTRAMUSCULAR; INTRAVENOUS; SUBCUTANEOUS ONCE
Status: COMPLETED | OUTPATIENT
Start: 2024-07-23 | End: 2024-07-23

## 2024-07-23 RX ORDER — LABETALOL HYDROCHLORIDE 5 MG/ML
10 INJECTION, SOLUTION INTRAVENOUS
Status: DISCONTINUED | OUTPATIENT
Start: 2024-07-23 | End: 2024-07-31 | Stop reason: HOSPADM

## 2024-07-23 RX ORDER — DIPHENHYDRAMINE HYDROCHLORIDE 50 MG/ML
25 INJECTION INTRAMUSCULAR; INTRAVENOUS EVERY 6 HOURS PRN
Status: DISCONTINUED | OUTPATIENT
Start: 2024-07-23 | End: 2024-07-23

## 2024-07-23 RX ORDER — ACETAMINOPHEN 500 MG
1000 TABLET ORAL ONCE
Status: COMPLETED | OUTPATIENT
Start: 2024-07-23 | End: 2024-07-23

## 2024-07-23 RX ORDER — IPRATROPIUM BROMIDE AND ALBUTEROL SULFATE 2.5; .5 MG/3ML; MG/3ML
3 SOLUTION RESPIRATORY (INHALATION) ONCE AS NEEDED
Status: DISCONTINUED | OUTPATIENT
Start: 2024-07-23 | End: 2024-07-23

## 2024-07-23 RX ORDER — KETAMINE HYDROCHLORIDE 100 MG/ML
INJECTION, SOLUTION INTRAMUSCULAR; INTRAVENOUS
Status: DISCONTINUED | OUTPATIENT
Start: 2024-07-23 | End: 2024-07-23

## 2024-07-23 RX ORDER — CEFAZOLIN SODIUM 2 G/50ML
2 SOLUTION INTRAVENOUS
Status: DISCONTINUED | OUTPATIENT
Start: 2024-07-23 | End: 2024-07-31 | Stop reason: HOSPADM

## 2024-07-23 RX ORDER — LORAZEPAM 0.5 MG/1
0.5 TABLET ORAL EVERY 6 HOURS PRN
Status: DISCONTINUED | OUTPATIENT
Start: 2024-07-23 | End: 2024-07-26

## 2024-07-23 RX ORDER — ONDANSETRON HYDROCHLORIDE 2 MG/ML
4 INJECTION, SOLUTION INTRAVENOUS ONCE
Status: DISCONTINUED | OUTPATIENT
Start: 2024-07-23 | End: 2024-07-23

## 2024-07-23 RX ORDER — SODIUM CHLORIDE 9 MG/ML
INJECTION, SOLUTION INTRAVENOUS CONTINUOUS
Status: DISCONTINUED | OUTPATIENT
Start: 2024-07-23 | End: 2024-07-27

## 2024-07-23 RX ORDER — METOCLOPRAMIDE HYDROCHLORIDE 5 MG/ML
10 INJECTION INTRAMUSCULAR; INTRAVENOUS EVERY 10 MIN PRN
Status: DISCONTINUED | OUTPATIENT
Start: 2024-07-23 | End: 2024-07-23

## 2024-07-23 RX ORDER — CEFAZOLIN SODIUM 2 G/50ML
2 SOLUTION INTRAVENOUS
Status: COMPLETED | OUTPATIENT
Start: 2024-07-23 | End: 2024-07-24

## 2024-07-23 RX ORDER — MIDAZOLAM HYDROCHLORIDE 2 MG/2ML
2 INJECTION, SOLUTION INTRAMUSCULAR; INTRAVENOUS ONCE AS NEEDED
Status: DISCONTINUED | OUTPATIENT
Start: 2024-07-23 | End: 2024-07-23 | Stop reason: HOSPADM

## 2024-07-23 RX ORDER — HYDROMORPHONE HCL IN 0.9% NACL 6 MG/30 ML
PATIENT CONTROLLED ANALGESIA SYRINGE INTRAVENOUS CONTINUOUS
Status: DISCONTINUED | OUTPATIENT
Start: 2024-07-23 | End: 2024-07-25

## 2024-07-23 RX ADMIN — Medication 150 MCG: at 01:07

## 2024-07-23 RX ADMIN — HYDROMORPHONE HYDROCHLORIDE 0.2 MG: 2 INJECTION INTRAMUSCULAR; INTRAVENOUS; SUBCUTANEOUS at 03:07

## 2024-07-23 RX ADMIN — CEFAZOLIN SODIUM 2 G: 2 SOLUTION INTRAVENOUS at 01:07

## 2024-07-23 RX ADMIN — ALBUMIN (HUMAN) 12.5 G: 5 SOLUTION INTRAVENOUS at 04:07

## 2024-07-23 RX ADMIN — GLYCOPYRROLATE 0.2 MG: 0.2 INJECTION INTRAMUSCULAR; INTRAVENOUS at 12:07

## 2024-07-23 RX ADMIN — LORAZEPAM 0.5 MG: 0.5 TABLET ORAL at 09:07

## 2024-07-23 RX ADMIN — PROPOFOL 180 MG: 10 INJECTION, EMULSION INTRAVENOUS at 12:07

## 2024-07-23 RX ADMIN — FENTANYL CITRATE 50 MCG: 50 INJECTION, SOLUTION INTRAMUSCULAR; INTRAVENOUS at 02:07

## 2024-07-23 RX ADMIN — Medication 100 MCG: at 02:07

## 2024-07-23 RX ADMIN — DEXTROSE MONOHYDRATE 250 ML: 100 INJECTION, SOLUTION INTRAVENOUS at 11:07

## 2024-07-23 RX ADMIN — KETAMINE HYDROCHLORIDE 25 MG: 100 INJECTION, SOLUTION, CONCENTRATE INTRAMUSCULAR; INTRAVENOUS at 01:07

## 2024-07-23 RX ADMIN — PHENYLEPHRINE HYDROCHLORIDE 30 MCG/MIN: 10 INJECTION INTRAVENOUS at 01:07

## 2024-07-23 RX ADMIN — Medication 100 MCG: at 01:07

## 2024-07-23 RX ADMIN — MEPERIDINE HYDROCHLORIDE 12.5 MG: 25 INJECTION INTRAMUSCULAR; INTRAVENOUS; SUBCUTANEOUS at 02:07

## 2024-07-23 RX ADMIN — KETOROLAC TROMETHAMINE 30 MG: 30 INJECTION, SOLUTION INTRAMUSCULAR at 05:07

## 2024-07-23 RX ADMIN — ENOXAPARIN SODIUM 40 MG: 40 INJECTION SUBCUTANEOUS at 11:07

## 2024-07-23 RX ADMIN — SUGAMMADEX 200 MG: 100 INJECTION, SOLUTION INTRAVENOUS at 02:07

## 2024-07-23 RX ADMIN — Medication: at 05:07

## 2024-07-23 RX ADMIN — METOCLOPRAMIDE 10 MG: 5 INJECTION, SOLUTION INTRAMUSCULAR; INTRAVENOUS at 11:07

## 2024-07-23 RX ADMIN — CEFAZOLIN SODIUM 2 G: 2 SOLUTION INTRAVENOUS at 09:07

## 2024-07-23 RX ADMIN — FAMOTIDINE 20 MG: 10 INJECTION, SOLUTION INTRAVENOUS at 11:07

## 2024-07-23 RX ADMIN — LIDOCAINE HYDROCHLORIDE 60 MG: 20 INJECTION INTRAVENOUS at 12:07

## 2024-07-23 RX ADMIN — HYDROMORPHONE HYDROCHLORIDE 0.2 MG: 2 INJECTION INTRAMUSCULAR; INTRAVENOUS; SUBCUTANEOUS at 02:07

## 2024-07-23 RX ADMIN — MUPIROCIN: 20 OINTMENT TOPICAL at 09:07

## 2024-07-23 RX ADMIN — ROCURONIUM BROMIDE 50 MG: 10 SOLUTION INTRAVENOUS at 12:07

## 2024-07-23 RX ADMIN — SODIUM CHLORIDE, SODIUM GLUCONATE, SODIUM ACETATE, POTASSIUM CHLORIDE AND MAGNESIUM CHLORIDE: 526; 502; 368; 37; 30 INJECTION, SOLUTION INTRAVENOUS at 12:07

## 2024-07-23 RX ADMIN — ACETAMINOPHEN 1000 MG: 500 TABLET ORAL at 11:07

## 2024-07-23 RX ADMIN — FENTANYL CITRATE 100 MCG: 50 INJECTION, SOLUTION INTRAMUSCULAR; INTRAVENOUS at 12:07

## 2024-07-23 RX ADMIN — ONDANSETRON 4 MG: 2 INJECTION INTRAMUSCULAR; INTRAVENOUS at 01:07

## 2024-07-23 RX ADMIN — MIDAZOLAM HYDROCHLORIDE 2 MG: 1 INJECTION, SOLUTION INTRAMUSCULAR; INTRAVENOUS at 12:07

## 2024-07-23 RX ADMIN — SODIUM CHLORIDE, POTASSIUM CHLORIDE, SODIUM LACTATE AND CALCIUM CHLORIDE: 600; 310; 30; 20 INJECTION, SOLUTION INTRAVENOUS at 03:07

## 2024-07-23 RX ADMIN — DEXTROSE MONOHYDRATE 1.5 G: 100 INJECTION, SOLUTION INTRAVENOUS at 12:07

## 2024-07-23 NOTE — NURSING
Nurses Note -- 4 Eyes      7/23/2024   6:11 PM      Skin assessed during: Admit      [x] No Altered Skin Integrity Present    []Prevention Measures Documented      [] Yes- Altered Skin Integrity Present or Discovered   [] LDA Added if Not in Epic (Describe Wound)   [] New Altered Skin Integrity was Present on Admit and Documented in LDA   [] Wound Image Taken    Wound Care Consulted? No    Attending Nurse:  Alva Balbuena RN    Second RN/Staff Member:   Sudhir Nunez RN

## 2024-07-23 NOTE — H&P
HISTORY & PHYSICAL  General Surgery    Patient Name: Beth Chaudhary  YOB: 1960    Date: 07/23/2024                     SUBJECTIVE:     Chief Complaint/Reason for Admission: No chief complaint on file.       History of Present Illness:  Ms. Beth Chaudhary is a 64 y.o. female is here for open incarcerated ventral hernia repair. She has a history of strenuous activity and reports she noticed abdominal bulge that has been present for quite some time. She states bulge has become larger over time and is now causing some discomfort. She denies changes in bladder or bowel habits or nausea/vomiting. She denies chest pain, shortness of breath, fever or chills.     Review of Systems:  12 point ROS negative except as stated in HPI    PAST HISTORY:     Past Medical History:   Diagnosis Date    Abdominal pain     Arthritis     Asthma     Back pain     Breast cancer, stage 1     lumpectomy    Breast pain     Chronic back pain     Claustrophobia     Constipation     CPAP (continuous positive airway pressure) dependence     Depression     Diabetes mellitus type I     Disorder of muscle     Dyspnea     Encounter for blood transfusion     Fibromyalgia     GERD (gastroesophageal reflux disease)     Hx of urinary tract infection     Hypercholesterolemia     Hyperlipidemia     Hypertension     Hypotension     IBS (irritable bowel syndrome)     Incarcerated ventral hernia     Memory loss     Muscular disorder     Necrotizing fasciitis     Osteoporosis     Panic attack     Personal history of colonic polyps 12/21/2018    Sleep apnea, unspecified     SOB (shortness of breath) on exertion     Urinary frequency     Ventral hernia     Weakness      Past Surgical History:   Procedure Laterality Date    APPENDECTOMY      CHOLECYSTECTOMY      COLONOSCOPY N/A 12/21/2018    FACIAL RECONSTRUCTION SURGERY      LUMPECTOMY, BREAST Left     necrotizing fasciitis      TONSILLECTOMY      VENTRAL HERNIA REPAIR  03/03/2020     Family History    Problem Relation Name Age of Onset    Heart disease Mother      Cancer Father      Thyroid disease Sister      No Known Problems Brother      No Known Problems Daughter      Diabetes Maternal Grandmother      Diabetes Maternal Grandfather      Diabetes Paternal Grandmother      Diabetes Paternal Grandfather       Social History     Socioeconomic History    Marital status:    Tobacco Use    Smoking status: Former     Passive exposure: Never    Smokeless tobacco: Never   Substance and Sexual Activity    Alcohol use: Not Currently    Drug use: Never    Sexual activity: Not Currently     Social Determinants of Health     Financial Resource Strain: Medium Risk (6/11/2024)    Received from [facility] and Its Subsidiaries and Affiliates    Overall Financial Resource Strain (CARDIA)     Difficulty of Paying Living Expenses: Somewhat hard   Food Insecurity: Patient Declined (6/11/2024)    Received from [facility] and Its Subsidiaries and Affiliates    Hunger Vital Sign     Worried About Running Out of Food in the Last Year: Patient declined     Ran Out of Food in the Last Year: Patient declined   Transportation Needs: Unmet Transportation Needs (6/11/2024)    [facility] from [facility] and [facility]    PRAPARE - Transportation     Lack of Transportation (Medical): Yes     Lack of Transportation (Non-Medical): Yes   Physical Activity: Insufficiently Active (6/11/2024)    Received from [facility] and Its Subsidiaries and Affiliates    Exercise Vital Sign     Days of Exercise per Week: 3 days     Minutes of Exercise per Session: 20 min   Stress: Stress Concern Present (6/11/2024)    Received from [facility] and Its Subsidiaries and Affiliates    Citizen of the [place] of Occupational Health - Occupational Stress  Questionnaire     Feeling of Stress : Very much       MEDICATIONS & ALLERGIES:     No current facility-administered medications on file prior to encounter.     Current Outpatient Medications on File Prior to Encounter   Medication Sig    albuterol (PROVENTIL/VENTOLIN HFA) 90 mcg/actuation inhaler Inhale 2 puffs into the lungs every 6 (six) hours as needed.    alpha lipoic acid 300 mg Cap Take 1 capsule by mouth every morning.    cholecalciferol, vitamin D3, (VITAMIN D3) 25 mcg (1,000 unit) capsule Take 2,500 Units by mouth once daily.    DEXCOM G7 SENSOR Rowena SMARTSI Topical Every 10 Days    LORazepam (ATIVAN) 1 MG tablet Take 1 mg by mouth 3 (three) times daily.    LYUMJEV KWIKPEN U-100 INSULIN 100 unit/mL pen Inject 10 Units into the skin after meals and at bedtime as needed.    metoprolol succinate (TOPROL-XL) 50 MG 24 hr tablet Take 50 mg by mouth once daily.    olmesartan (BENICAR) 20 MG tablet Take 20 mg by mouth once daily.    pravastatin (PRAVACHOL) 80 MG tablet Take 80 mg by mouth once daily.    venlafaxine 225 mg TR24 Take 1 tablet by mouth every morning.    BASAGLAR KWIKPEN U-100 INSULIN 100 unit/mL (3 mL) InPn pen Inject 43 Units into the skin every evening.    clotrimazole-betamethasone 1-0.05% (LOTRISONE) cream SMARTSIG:sparingly Topical Twice Daily PRN    FIASP FLEXTOUCH U-100 INSULIN 100 unit/mL (3 mL) InPn SMARTSIG:10 Unit(s) SUB-Q 3 Times Daily    fluticasone propionate (FLONASE) 50 mcg/actuation nasal spray 1 spray by Each Nostril route as needed.    hydrOXYzine HCL (ATARAX) 25 MG tablet Take 25 mg by mouth as needed.    insulin NPH-insulin regular, 70/30, 100 unit/mL (70-30) injection Inject into the skin 2 (two) times daily.    mupirocin (BACTROBAN) 2 % ointment Apply topically 3 (three) times daily.    nitrofurantoin, macrocrystal-monohydrate, (MACROBID) 100 MG capsule Take 100 mg by mouth.    TOUJEO SOLOSTAR U-300 INSULIN 300 unit/mL (1.5 mL) InPn pen SMARTSI Unit(s) SUB-Q Daily  "    Review of patient's allergies indicates:   Allergen Reactions    Gabapentin      Other reaction(s): Hives    Morphine      Other reaction(s): morphine  Other reaction(s): morphine      Sulfa (sulfonamide antibiotics)     Codeine Rash         OBJECTIVE:   Visit Vitals  Ht 5' 7" (1.702 m)   Wt 83.9 kg (185 lb)   Breastfeeding No   BMI 28.98 kg/m²       Physical Exam:  General:  Well developed, well nourished, no acute distress  HEENT:  Normocephalic, atraumatic, PERRL, EOMI, clear sclera, ears normal, neck supple, throat clear without erythema or exudates  CVS:  RRR, S1 and S2 normal, no murmurs, rubs, gallops  Resp:  Lungs clear to auscultation, no wheezes, rales, rhonchi, cough  GI:  Abdomen soft, non-tender, non-distended, normoactive bowel sounds, no masses.   + Incarcerated ventral hernia, NTTP  :  Deferred  MSK:  No muscle atrophy, cyanosis, peripheral edema, full range of motion  Skin:  No rashes, ulcers, erythema  Neuro:  CNII-XII grossly intact  Psych:  Alert and oriented to person, place, and time    Results:  I have independently reviewed all pertinent lab (CBC, CMP) and radiologic studies (CT, US) relevant to general/bariatric surgery.    CT - Incarcerated Ventral Hernia with involved Colon    VISIT DIAGNOSES:     No diagnosis found.       ASSESSMENT/PLAN:     62 yo female with Incarcerated Ventral Hernia (Chronic with Exacerbation)      - Open Incarcerated Ventral Hernia Repair    - Questions and concerns were addressed after discussion about surgery risks/benefits and patient expressed understanding.     João Gaffney M.D.   [facility] General Surgery - PGY1         "

## 2024-07-23 NOTE — ANESTHESIA PROCEDURE NOTES
Intubation    Date/Time: 7/23/2024 12:57 PM    Performed by: Pravin Meyers CRNA  Authorized by: Jase Jiménez DO    Intubation:     Induction:  Intravenous    Intubated:  Postinduction    Mask Ventilation:  Easy mask    Attempts:  1    Attempted By:  CRNA    Method of Intubation:  Video laryngoscopy    Blade:  Arreguin 4    Laryngeal View Grade: Grade I - full view of cords      Difficult Airway Encountered?: No      Complications:  None    Airway Device:  Oral endotracheal tube    Airway Device Size:  7.0    Style/Cuff Inflation:  Cuffed (inflated to minimal occlusive pressure)    Inflation Amount (mL):  7    Tube secured:  22    Secured at:  The lips    Placement Verified By:  Capnometry and Revisualization with laryngoscopy    Complicating Factors:  None    Findings Post-Intubation:  BS equal bilateral and atraumatic/condition of teeth unchanged

## 2024-07-23 NOTE — TRANSFER OF CARE
"Anesthesia Transfer of Care Note    Patient: Beth Chaudhary    Procedure(s) Performed: Procedure(s) (LRB):  REPAIR, HERNIA, VENTRAL (N/A)    Patient location: PACU    Anesthesia Type: general    Transport from OR: Transported from OR on room air with adequate spontaneous ventilation    Post pain: adequate analgesia    Post assessment: no apparent anesthetic complications    Post vital signs: stable    Level of consciousness: awake, alert and oriented    Nausea/Vomiting: no nausea/vomiting    Complications: none    Transfer of care protocol was followed      Last vitals: Visit Vitals  BP (!) 147/97 (BP Location: Right arm)   Pulse 108   Temp 36.2 °C (97.2 °F) (Oral)   Resp 18   Ht 5' 7" (1.702 m)   Wt 83.7 kg (184 lb 8.4 oz)   SpO2 95%   Breastfeeding No   BMI 28.90 kg/m²     "

## 2024-07-23 NOTE — ANESTHESIA PREPROCEDURE EVALUATION
07/23/2024  Beth Chaudhary is a 64 y.o., female.        Other Medical History   Diabetes mellitus type I Hypotension   Hyperlipidemia Personal history of colonic polyps   Breast cancer, stage 1 Memory loss   Weakness Urinary frequency   Sleep apnea, unspecified Panic attack   Osteoporosis Necrotizing fasciitis   Muscular disorder IBS (irritable bowel syndrome)   Ventral hernia GERD (gastroesophageal reflux disease)   Fibromyalgia Depression   CPAP (continuous positive airway pressure) dependence Constipation   Claustrophobia Asthma   Arthritis Abdominal pain   Encounter for blood transfusion Breast pain   Disorder of muscle Dyspnea   Hx of urinary tract infection Hypercholesterolemia   Hypertension Incarcerated ventral hernia   SOB (shortness of breath) on exertion      H/H: 13/41  EKG: NSR    Surgical History    CHOLECYSTECTOMY APPENDECTOMY   TONSILLECTOMY COLONOSCOPY   LUMPECTOMY, BREAST VENTRAL HERNIA REPAIR   necrotizing fasciitis FACIAL RECONSTRUCTION SURGERY         Pre-op Assessment    I have reviewed the Patient Summary Reports.     I have reviewed the Nursing Notes. I have reviewed the NPO Status.   I have reviewed the Medications.     Review of Systems  Anesthesia Hx:  No problems with previous Anesthesia                Social:  Non-Smoker       Cardiovascular:     Hypertension              ECG has been reviewed.                          Pulmonary:    Asthma  Shortness of breath  Sleep Apnea                Hepatic/GI:     GERD             Neurological:    Neuromuscular Disease,                                   Endocrine:  Diabetes           Psych:  Psychiatric History                  Physical Exam  General: Well nourished, Cooperative, Alert and Oriented    Airway:  Mallampati: IV   Mouth Opening: Normal  TM Distance: Normal  Tongue: Normal  Neck ROM: Normal  ROM    Dental:  Intact    Chest/Lungs:  Clear to auscultation, Normal Respiratory Rate    Heart:  Rate: Normal  Rhythm: Regular Rhythm  Sounds: Normal    Abdomen:  Normal, Soft, Nontender        Anesthesia Plan  Type of Anesthesia, risks & benefits discussed:    Anesthesia Type: Gen ETT  Intra-op Monitoring Plan: Standard ASA Monitors  Post Op Pain Control Plan: multimodal analgesia  Induction:  IV  Airway Plan: Direct  Informed Consent: Informed consent signed with the Patient and all parties understand the risks and agree with anesthesia plan.  All questions answered.   ASA Score: 3  Day of Surgery Review of History & Physical: H&P Update referred to the surgeon/provider.    Ready For Surgery From Anesthesia Perspective.     .

## 2024-07-24 LAB
ANION GAP SERPL CALC-SCNC: 10 MEQ/L
BASOPHILS # BLD AUTO: 0.07 X10(3)/MCL
BASOPHILS NFR BLD AUTO: 0.6 %
BUN SERPL-MCNC: 24.3 MG/DL (ref 9.8–20.1)
CALCIUM SERPL-MCNC: 8.7 MG/DL (ref 8.4–10.2)
CHLORIDE SERPL-SCNC: 104 MMOL/L (ref 98–107)
CO2 SERPL-SCNC: 25 MMOL/L (ref 23–31)
CREAT SERPL-MCNC: 2.34 MG/DL (ref 0.55–1.02)
CREAT/UREA NIT SERPL: 10
EOSINOPHIL # BLD AUTO: 0.37 X10(3)/MCL (ref 0–0.9)
EOSINOPHIL NFR BLD AUTO: 3 %
ERYTHROCYTE [DISTWIDTH] IN BLOOD BY AUTOMATED COUNT: 13.2 % (ref 11.5–17)
GFR SERPLBLD CREATININE-BSD FMLA CKD-EPI: 23 ML/MIN/1.73/M2
GLUCOSE SERPL-MCNC: 151 MG/DL (ref 82–115)
HCT VFR BLD AUTO: 40.9 % (ref 37–47)
HGB BLD-MCNC: 12.7 G/DL (ref 12–16)
IMM GRANULOCYTES # BLD AUTO: 0.05 X10(3)/MCL (ref 0–0.04)
IMM GRANULOCYTES NFR BLD AUTO: 0.4 %
LYMPHOCYTES # BLD AUTO: 2.02 X10(3)/MCL (ref 0.6–4.6)
LYMPHOCYTES NFR BLD AUTO: 16.5 %
MCH RBC QN AUTO: 30.8 PG (ref 27–31)
MCHC RBC AUTO-ENTMCNC: 31.1 G/DL (ref 33–36)
MCV RBC AUTO: 99 FL (ref 80–94)
MONOCYTES # BLD AUTO: 1.23 X10(3)/MCL (ref 0.1–1.3)
MONOCYTES NFR BLD AUTO: 10.1 %
NEUTROPHILS # BLD AUTO: 8.47 X10(3)/MCL (ref 2.1–9.2)
NEUTROPHILS NFR BLD AUTO: 69.4 %
NRBC BLD AUTO-RTO: 0 %
PLATELET # BLD AUTO: 218 X10(3)/MCL (ref 130–400)
PMV BLD AUTO: 9.4 FL (ref 7.4–10.4)
POTASSIUM SERPL-SCNC: 5.4 MMOL/L (ref 3.5–5.1)
RBC # BLD AUTO: 4.13 X10(6)/MCL (ref 4.2–5.4)
SODIUM SERPL-SCNC: 139 MMOL/L (ref 136–145)
WBC # BLD AUTO: 12.21 X10(3)/MCL (ref 4.5–11.5)

## 2024-07-24 PROCEDURE — 85025 COMPLETE CBC W/AUTO DIFF WBC: CPT

## 2024-07-24 PROCEDURE — 63600175 PHARM REV CODE 636 W HCPCS

## 2024-07-24 PROCEDURE — 80048 BASIC METABOLIC PNL TOTAL CA: CPT

## 2024-07-24 PROCEDURE — 94799 UNLISTED PULMONARY SVC/PX: CPT

## 2024-07-24 PROCEDURE — 25000003 PHARM REV CODE 250

## 2024-07-24 PROCEDURE — 99900035 HC TECH TIME PER 15 MIN (STAT)

## 2024-07-24 PROCEDURE — 21400001 HC TELEMETRY ROOM

## 2024-07-24 PROCEDURE — 99900031 HC PATIENT EDUCATION (STAT)

## 2024-07-24 PROCEDURE — 94760 N-INVAS EAR/PLS OXIMETRY 1: CPT

## 2024-07-24 PROCEDURE — 11044 DBRDMT BONE 1ST 20 SQ CM/<: CPT

## 2024-07-24 PROCEDURE — 25000003 PHARM REV CODE 250: Performed by: SURGERY

## 2024-07-24 PROCEDURE — 36415 COLL VENOUS BLD VENIPUNCTURE: CPT

## 2024-07-24 PROCEDURE — 11000001 HC ACUTE MED/SURG PRIVATE ROOM

## 2024-07-24 RX ORDER — ENOXAPARIN SODIUM 100 MG/ML
30 INJECTION SUBCUTANEOUS DAILY
Status: DISCONTINUED | OUTPATIENT
Start: 2024-07-25 | End: 2024-07-31 | Stop reason: HOSPADM

## 2024-07-24 RX ADMIN — PANTOPRAZOLE SODIUM 40 MG: 40 INJECTION, POWDER, FOR SOLUTION INTRAVENOUS at 09:07

## 2024-07-24 RX ADMIN — Medication: at 07:07

## 2024-07-24 RX ADMIN — ACETAMINOPHEN 1000 MG: 500 TABLET ORAL at 05:07

## 2024-07-24 RX ADMIN — KETOROLAC TROMETHAMINE 30 MG: 30 INJECTION, SOLUTION INTRAMUSCULAR at 05:07

## 2024-07-24 RX ADMIN — CEFAZOLIN SODIUM 2 G: 2 SOLUTION INTRAVENOUS at 05:07

## 2024-07-24 RX ADMIN — KETOROLAC TROMETHAMINE 30 MG: 30 INJECTION, SOLUTION INTRAMUSCULAR at 12:07

## 2024-07-24 RX ADMIN — ACETAMINOPHEN 1000 MG: 500 TABLET ORAL at 02:07

## 2024-07-24 RX ADMIN — SODIUM CHLORIDE, POTASSIUM CHLORIDE, SODIUM LACTATE AND CALCIUM CHLORIDE: 600; 310; 30; 20 INJECTION, SOLUTION INTRAVENOUS at 12:07

## 2024-07-24 RX ADMIN — MUPIROCIN: 20 OINTMENT TOPICAL at 09:07

## 2024-07-24 RX ADMIN — KETOROLAC TROMETHAMINE 30 MG: 30 INJECTION, SOLUTION INTRAMUSCULAR at 06:07

## 2024-07-24 RX ADMIN — ENOXAPARIN SODIUM 40 MG: 40 INJECTION SUBCUTANEOUS at 09:07

## 2024-07-24 RX ADMIN — ONDANSETRON 4 MG: 4 TABLET, ORALLY DISINTEGRATING ORAL at 02:07

## 2024-07-24 NOTE — NURSING
Nurses Note -- 4 Eyes      7/23/2024   7:15 pm       Skin assessed during: Daily Assessment      [x] No Altered Skin Integrity Present    []Prevention Measures Documented      [] Yes- Altered Skin Integrity Present or Discovered   [] LDA Added if Not in Epic (Describe Wound)   [] New Altered Skin Integrity was Present on Admit and Documented in LDA   [] Wound Image Taken    Wound Care Consulted? No    Attending Nurse:  Mary Nayak RN    Second RN/Staff Member:  Alva Hernández RN

## 2024-07-24 NOTE — NURSING
Nurses Note -- 4 Eyes      7/24/2024   8:19 AM      Skin assessed during: Q Shift Change      [] No Altered Skin Integrity Present    []Prevention Measures Documented      [x] Yes- Altered Skin Integrity Present or Discovered   [] LDA Added if Not in Epic (Describe Wound)   [x] New Altered Skin Integrity was Present on Admit and Documented in LDA   [] Wound Image Taken    Wound Care Consulted? No    Attending Nurse:  Trudy Monte RN    Second RN/Staff Member:  FIFI Hernandez

## 2024-07-24 NOTE — ANESTHESIA POSTPROCEDURE EVALUATION
Anesthesia Post Evaluation    Patient: Beth Chaudhary    Procedure(s) Performed: Procedure(s) (LRB):  REPAIR, HERNIA, VENTRAL (N/A)    Final Anesthesia Type: general      Patient location during evaluation: PACU  Patient participation: Yes- Able to Participate  Level of consciousness: awake and alert  Post-procedure vital signs: reviewed and stable  Pain management: adequate  Airway patency: patent  GABE mitigation strategies: Multimodal analgesia  PONV status at discharge: No PONV  Anesthetic complications: no      Cardiovascular status: hemodynamically stable  Respiratory status: unassisted  Hydration status: euvolemic  Follow-up not needed.              Vitals Value Taken Time   BP 91/60 07/24/24 1146   Temp 36.8 °C (98.2 °F) 07/24/24 1146   Pulse 89 07/24/24 1146   Resp 14 07/24/24 1146   SpO2 97 % 07/24/24 1146         Event Time   Out of Recovery 16:56:11         Pain/Odilon Score: Pain Rating Prior to Med Admin: 3 (7/24/2024  5:19 AM)  Pain Rating Post Med Admin: 2 (7/24/2024  5:49 AM)  Odilon Score: 9 (7/23/2024  5:15 PM)

## 2024-07-24 NOTE — PROGRESS NOTES
[facility] - Oncology Acute  General Surgery  Progress Note    Subjective:     POD#1 s/p open ventral hernia repair    AF, VS reviewed. Hypotensive overnight.    NAEON. Pain managed well with PCA pump overnight.     Not passing flatus yet, no BMs. No n/v.      Post-Op Info:  Procedure(s) (LRB):  REPAIR, HERNIA, VENTRAL (N/A)   1 Day Post-Op      Medications:  Continuous Infusions:   0.9% NaCl   Intravenous Continuous        hydromorphone in 0.9 % NaCl 6 mg/30 ml   Intravenous Continuous   New Syringe/Bag at 07/23/24 1715    lactated ringers   Intravenous Continuous 75 mL/hr at 07/24/24 0016 New Bag at 07/24/24 0016    lactated ringers   Intravenous Continuous 125 mL/hr at 07/23/24 1538 New Bag at 07/23/24 1538     Scheduled Meds:   acetaminophen  1,000 mg Oral Q8H    enoxaparin  40 mg Subcutaneous Daily    ketorolac  30 mg Intravenous Q6H    mupirocin   Nasal BID    pantoprazole  40 mg Intravenous Daily     PRN Meds:  Current Facility-Administered Medications:     ceFAZolin (Ancef) IV (PEDS and ADULTS), 2 g, Intravenous, On Call Procedure    cloNIDine 0.1 mg/24 hr td ptwk, 1 patch, Transdermal, Once PRN    dextrose 10%, 12.5 g, Intravenous, PRN    dextrose 10%, 25 g, Intravenous, PRN    labetalol, 10 mg, Intravenous, Q2H PRN    LORazepam, 0.5 mg, Oral, Q6H PRN    naloxone, 0.02 mg, Intravenous, PRN    ondansetron, 4 mg, Oral, Q4H PRN    ondansetron, 4 mg, Intravenous, Q4H PRN    prochlorperazine, 5 mg, Intravenous, Q4H PRN     Objective:     Vital Signs (Most Recent):  Temp: 98.5 °F (36.9 °C) (07/24/24 0734)  Pulse: 95 (07/24/24 0734)  Resp: 19 (07/24/24 0529)  BP: 104/65 (07/24/24 0734)  SpO2: 95 % (07/24/24 0734) Vital Signs (24h Range):  Temp:  [97.2 °F (36.2 °C)-98.5 °F (36.9 °C)] 98.5 °F (36.9 °C)  Pulse:  [] 95  Resp:  [11-30] 19  SpO2:  [89 %-100 %] 95 %  BP: ()/(43-97) 104/65       Intake/Output Summary (Last 24 hours) at 7/24/2024 0802  Last data filed at 7/23/2024 5845  Gross per  24 hour   Intake 800 ml   Output 190 ml   Net 610 ml       Physical Exam  Constitutional:       Appearance: Normal appearance. She is not ill-appearing.   Cardiovascular:      Rate and Rhythm: Normal rate and regular rhythm.   Pulmonary:      Effort: Pulmonary effort is normal. No respiratory distress.   Abdominal:      General: Abdomen is flat. There is no distension.      Palpations: Abdomen is soft.      Tenderness: There is abdominal tenderness. There is no guarding or rebound.      Comments: Dressing with some bloody seepage towards inferior of incision. No active bleeding. Otherwise intact and clean.   Skin:     General: Skin is warm and dry.   Neurological:      Mental Status: She is alert.         Significant Labs:  CBC:   Recent Labs   Lab 07/24/24  0514   WBC 12.21*   RBC 4.13*   HGB 12.7   HCT 40.9      MCV 99.0*   MCH 30.8   MCHC 31.1*     CMP:   Recent Labs   Lab 07/24/24  0339   CALCIUM 8.7      K 5.4*   CO2 25      BUN 24.3*   CREATININE 2.34*       Significant Diagnostics:  None    Assessment/Plan:     There are no hospital problems to display for this patient.    POD#1  -progressing well  -continue PCA pump. Reassess pain regimen this afternoon  -hypotension likely due to dilaudid PCA pump  -Elevated BUN/Cr, trend with repeat CMP tomorrow morning  -ambulate  -incentive spirometry  -NPO diet except for ice chips and sips with medications  -dc jailene Gaffney MD  General Surgery, PGY-1  [facility]

## 2024-07-24 NOTE — PROGRESS NOTES
Pharmacist Renal Dose Adjustment Note    Beth Chaudhary is a 64 y.o. female being treated with the medication Lovenox    Patient Data:    Vital Signs (Most Recent):  Temp: 98.5 °F (36.9 °C) (07/24/24 0734)  Pulse: 95 (07/24/24 0734)  Resp: 16 (07/24/24 0734)  BP: 104/65 (07/24/24 0734)  SpO2: 95 % (07/24/24 0734) Vital Signs (72h Range):  Temp:  [97.2 °F (36.2 °C)-98.5 °F (36.9 °C)]   Pulse:  []   Resp:  [11-30]   BP: ()/(43-97)   SpO2:  [89 %-100 %]      Recent Labs   Lab 07/24/24 0339   CREATININE 2.34*     Serum creatinine: 2.34 mg/dL (H) 07/24/24 0339  Estimated creatinine clearance: 27 mL/min (A)    Lovenox 40mg q24h will be changed to Lovenox 30mg q24h based on Creatinine Clearance = 27 mL/min.     Pharmacist's Name: Asher Barrett  Pharmacist's Extension: [phone]

## 2024-07-24 NOTE — PLAN OF CARE
Problem: Adult Inpatient Plan of Care  Goal: Plan of Care Review  7/24/2024 0349 by Mary Bardales RN  Outcome: Progressing  Flowsheets (Taken 7/24/2024 0349)  Plan of Care Reviewed With: patient  7/24/2024 0318 by Mary Bardales RN  Outcome: Progressing  Flowsheets (Taken 7/24/2024 0318)  Plan of Care Reviewed With: patient  Goal: Patient-Specific Goal (Individualized)  7/24/2024 0349 by Mary Bardales RN  Outcome: Progressing  7/24/2024 0318 by Mary Bardales RN  Outcome: Progressing  Goal: Absence of Hospital-Acquired Illness or Injury  7/24/2024 0349 by Mary Bardales RN  Outcome: Progressing  7/24/2024 0318 by Mary Bardales RN  Outcome: Progressing  Intervention: Identify and Manage Fall Risk  7/24/2024 0349 by Mary Bardales RN  Flowsheets (Taken 7/24/2024 0349)  Safety Promotion/Fall Prevention:   assistive device/personal item within reach   side rails raised x 2   nonskid shoes/socks when out of bed  7/24/2024 0318 by Mary Bardales RN  Flowsheets (Taken 7/24/2024 0318)  Safety Promotion/Fall Prevention:   assistive device/personal item within reach   side rails raised x 2   nonskid shoes/socks when out of bed  Intervention: Prevent Skin Injury  7/24/2024 0349 by Mary Bardalse RN  Flowsheets (Taken 7/24/2024 0349)  Body Position: position changed independently  Skin Protection: protective footwear used  Device Skin Pressure Protection: tubing/devices free from skin contact  7/24/2024 0318 by Mary Bardales RN  Flowsheets (Taken 7/24/2024 0318)  Body Position: position changed independently  Skin Protection: protective footwear used  Device Skin Pressure Protection: tubing/devices free from skin contact  Intervention: Prevent and Manage VTE (Venous Thromboembolism) Risk  7/24/2024 0349 by Mary Bardales RN  Flowsheets (Taken 7/24/2024 0349)  VTE Prevention/Management: remove, assess skin, and reapply sequential compression device  7/24/2024 0318 by Jeffy  FIFI Hernandez  Flowsheets (Taken 7/24/2024 0318)  VTE Prevention/Management: remove, assess skin, and reapply sequential compression device  Intervention: Prevent Infection  7/24/2024 0349 by Mary Bardales RN  Flowsheets (Taken 7/24/2024 0349)  Infection Prevention:   rest/sleep promoted   single patient room provided  7/24/2024 0318 by Mary Bardales RN  Flowsheets (Taken 7/24/2024 0318)  Infection Prevention:   rest/sleep promoted   single patient room provided  Goal: Optimal Comfort and Wellbeing  7/24/2024 0349 by Mary Bardales RN  Outcome: Progressing  7/24/2024 0318 by Mary Bardales RN  Outcome: Progressing  Intervention: Monitor Pain and Promote Comfort  7/24/2024 0349 by Mary Bardales RN  Flowsheets (Taken 7/24/2024 0349)  Pain Management Interventions:   quiet environment facilitated   care clustered  7/24/2024 0318 by Mary Bardales RN  Flowsheets (Taken 7/24/2024 0318)  Pain Management Interventions:   care clustered   quiet environment facilitated  Intervention: Provide Person-Centered Care  7/24/2024 0349 by Mary Bardales RN  Flowsheets (Taken 7/24/2024 0349)  Trust Relationship/Rapport:   questions answered   questions encouraged  7/24/2024 0318 by Mary Bardales RN  Flowsheets (Taken 7/24/2024 0318)  Trust Relationship/Rapport:   questions answered   questions encouraged  Goal: Readiness for Transition of Care  7/24/2024 0349 by Mary Bardales RN  Outcome: Progressing  7/24/2024 0318 by Mary Bardales RN  Outcome: Progressing

## 2024-07-24 NOTE — PLAN OF CARE
Problem: Adult Inpatient Plan of Care  Goal: Plan of Care Review  Outcome: Progressing  Flowsheets (Taken 7/24/2024 0318)  Plan of Care Reviewed With: patient  Goal: Patient-Specific Goal (Individualized)  Outcome: Progressing  Goal: Absence of Hospital-Acquired Illness or Injury  Outcome: Progressing  Intervention: Identify and Manage Fall Risk  Flowsheets (Taken 7/24/2024 0318)  Safety Promotion/Fall Prevention:   assistive device/personal item within reach   side rails raised x 2   nonskid shoes/socks when out of bed  Intervention: Prevent Skin Injury  Flowsheets (Taken 7/24/2024 0318)  Body Position: position changed independently  Skin Protection: protective footwear used  Device Skin Pressure Protection: tubing/devices free from skin contact  Intervention: Prevent and Manage VTE (Venous Thromboembolism) Risk  Flowsheets (Taken 7/24/2024 0318)  VTE Prevention/Management: remove, assess skin, and reapply sequential compression device  Intervention: Prevent Infection  Flowsheets (Taken 7/24/2024 0318)  Infection Prevention:   rest/sleep promoted   single patient room provided  Goal: Optimal Comfort and Wellbeing  Outcome: Progressing  Intervention: Monitor Pain and Promote Comfort  Flowsheets (Taken 7/24/2024 0318)  Pain Management Interventions:   care clustered   quiet environment facilitated  Intervention: Provide Person-Centered Care  Flowsheets (Taken 7/24/2024 0318)  Trust Relationship/Rapport:   questions answered   questions encouraged  Goal: Readiness for Transition of Care  Outcome: Progressing

## 2024-07-24 NOTE — PLAN OF CARE
07/24/24 1036   Discharge Assessment   Assessment Type Discharge Planning Assessment   Confirmed/corrected address, phone number and insurance Yes   Confirmed Demographics Correct on Facesheet   Source of Information patient;family   Communicated STEPHANIE with patient/caregiver Date not available/Unable to determine   Reason For Admission incarcerated ventral hernia   People in Home spouse   Do you expect to return to your current living situation? Yes   Do you have help at home or someone to help you manage your care at home? Yes   Who are your caregiver(s) and their phone number(s)?  Roberto Chaudhary [phone]   Prior to hospitilization cognitive status: Alert/Oriented   Current cognitive status: Alert/Oriented   Walking or Climbing Stairs Difficulty no   Dressing/Bathing Difficulty no   Equipment Currently Used at Home glucometer;CPAP  (dexcom glucometer)   Patient currently being followed by outpatient case management? No   Do you currently have service(s) that help you manage your care at home? No   Do you have prescription coverage? Yes   Coverage [employer]   Do you have any problems affording any of your prescribed medications? No   Is the patient taking medications as prescribed? yes   Who is going to help you get home at discharge?    How do you get to doctors appointments? car, drives self   Are you on dialysis? No   Do you take coumadin? No   Discharge Plan A Home   Discharge Plan B Home   DME Needed Upon Discharge  none   Discharge Plan discussed with: Patient;Spouse/sig other   Name(s) and Number(s)  Roberto   Transition of Care Barriers None   Physical Activity   On average, how many days per week do you engage in moderate to strenuous exercise (like a brisk walk)? 0 days   On average, how many minutes do you engage in exercise at this level? 0 min   Financial Resource Strain   How hard is it for you to pay for the very basics like food, housing, medical care, and  heating? Not hard   Housing Stability   In the last 12 months, was there a time when you were not able to pay the mortgage or rent on time? N   At any time in the past 12 months, were you homeless or living in a shelter (including now)? N   Food Insecurity   Within the past 12 months, you worried that your food would run out before you got the money to buy more. Never true   Within the past 12 months, the food you bought just didn't last and you didn't have money to get more. Never true   Stress   Do you feel stress - tense, restless, nervous, or anxious, or unable to sleep at night because your mind is troubled all the time - these days? Only a littl   Social Isolation   How often do you feel lonely or isolated from those around you?  Never   Alcohol Use   Q1: How often do you have a drink containing alcohol? Never   Q2: How many drinks containing alcohol do you have on a typical day when you are drinking? None   Q3: How often do you have six or more drinks on one occasion? Never   Utilities   In the past 12 months has the electric, gas, oil, or water company threatened to shut off services in your home? No   Health Literacy   How often do you need to have someone help you when you read instructions, pamphlets, or other written material from your doctor or pharmacy? Never   OTHER   Name(s) of People in Home  Roberto

## 2024-07-25 LAB
ALBUMIN SERPL-MCNC: 3.5 G/DL (ref 3.4–4.8)
ALBUMIN/GLOB SERPL: 1.1 RATIO (ref 1.1–2)
ALP SERPL-CCNC: 78 UNIT/L (ref 40–150)
ALT SERPL-CCNC: 5 UNIT/L (ref 0–55)
ANION GAP SERPL CALC-SCNC: 11 MEQ/L
AST SERPL-CCNC: 34 UNIT/L (ref 5–34)
BILIRUB SERPL-MCNC: 0.4 MG/DL
BUN SERPL-MCNC: 26.2 MG/DL (ref 9.8–20.1)
CALCIUM SERPL-MCNC: 8.8 MG/DL (ref 8.4–10.2)
CHLORIDE SERPL-SCNC: 103 MMOL/L (ref 98–107)
CO2 SERPL-SCNC: 23 MMOL/L (ref 23–31)
CREAT SERPL-MCNC: 1.66 MG/DL (ref 0.55–1.02)
CREAT/UREA NIT SERPL: 16
GFR SERPLBLD CREATININE-BSD FMLA CKD-EPI: 34 ML/MIN/1.73/M2
GLOBULIN SER-MCNC: 3.1 GM/DL (ref 2.4–3.5)
GLUCOSE SERPL-MCNC: 110 MG/DL (ref 82–115)
HCT VFR BLD AUTO: 39.9 % (ref 37–47)
HGB BLD-MCNC: 12.5 G/DL (ref 12–16)
POTASSIUM SERPL-SCNC: 4.9 MMOL/L (ref 3.5–5.1)
PROT SERPL-MCNC: 6.6 GM/DL (ref 5.8–7.6)
SODIUM SERPL-SCNC: 137 MMOL/L (ref 136–145)

## 2024-07-25 PROCEDURE — 11000001 HC ACUTE MED/SURG PRIVATE ROOM

## 2024-07-25 PROCEDURE — 85014 HEMATOCRIT: CPT

## 2024-07-25 PROCEDURE — 25000003 PHARM REV CODE 250: Performed by: SURGERY

## 2024-07-25 PROCEDURE — 63600175 PHARM REV CODE 636 W HCPCS

## 2024-07-25 PROCEDURE — 85018 HEMOGLOBIN: CPT

## 2024-07-25 PROCEDURE — 63600175 PHARM REV CODE 636 W HCPCS: Performed by: SURGERY

## 2024-07-25 PROCEDURE — 94799 UNLISTED PULMONARY SVC/PX: CPT

## 2024-07-25 PROCEDURE — 99900035 HC TECH TIME PER 15 MIN (STAT)

## 2024-07-25 PROCEDURE — 80053 COMPREHEN METABOLIC PANEL: CPT

## 2024-07-25 PROCEDURE — 25000003 PHARM REV CODE 250

## 2024-07-25 PROCEDURE — 36415 COLL VENOUS BLD VENIPUNCTURE: CPT

## 2024-07-25 RX ORDER — MEPERIDINE HYDROCHLORIDE 25 MG/ML
50 INJECTION INTRAMUSCULAR; INTRAVENOUS; SUBCUTANEOUS EVERY 4 HOURS PRN
Status: ACTIVE | OUTPATIENT
Start: 2024-07-25 | End: 2024-07-26

## 2024-07-25 RX ADMIN — SODIUM CHLORIDE, POTASSIUM CHLORIDE, SODIUM LACTATE AND CALCIUM CHLORIDE: 600; 310; 30; 20 INJECTION, SOLUTION INTRAVENOUS at 12:07

## 2024-07-25 RX ADMIN — KETOROLAC TROMETHAMINE 30 MG: 30 INJECTION, SOLUTION INTRAMUSCULAR at 05:07

## 2024-07-25 RX ADMIN — ONDANSETRON 4 MG: 2 INJECTION INTRAMUSCULAR; INTRAVENOUS at 05:07

## 2024-07-25 RX ADMIN — LORAZEPAM 0.5 MG: 0.5 TABLET ORAL at 05:07

## 2024-07-25 RX ADMIN — KETOROLAC TROMETHAMINE 30 MG: 30 INJECTION, SOLUTION INTRAMUSCULAR at 12:07

## 2024-07-25 RX ADMIN — ACETAMINOPHEN 1000 MG: 500 TABLET ORAL at 12:07

## 2024-07-25 RX ADMIN — ENOXAPARIN SODIUM 30 MG: 30 INJECTION SUBCUTANEOUS at 09:07

## 2024-07-25 RX ADMIN — PROCHLORPERAZINE EDISYLATE 5 MG: 5 INJECTION INTRAMUSCULAR; INTRAVENOUS at 08:07

## 2024-07-25 RX ADMIN — MUPIROCIN: 20 OINTMENT TOPICAL at 09:07

## 2024-07-25 RX ADMIN — PANTOPRAZOLE SODIUM 40 MG: 40 INJECTION, POWDER, FOR SOLUTION INTRAVENOUS at 09:07

## 2024-07-25 RX ADMIN — SODIUM CHLORIDE, POTASSIUM CHLORIDE, SODIUM LACTATE AND CALCIUM CHLORIDE: 600; 310; 30; 20 INJECTION, SOLUTION INTRAVENOUS at 10:07

## 2024-07-25 RX ADMIN — ONDANSETRON 4 MG: 2 INJECTION INTRAMUSCULAR; INTRAVENOUS at 12:07

## 2024-07-25 RX ADMIN — LORAZEPAM 0.5 MG: 0.5 TABLET ORAL at 11:07

## 2024-07-25 RX ADMIN — PROCHLORPERAZINE EDISYLATE 5 MG: 5 INJECTION INTRAMUSCULAR; INTRAVENOUS at 09:07

## 2024-07-25 NOTE — PROGRESS NOTES
[facility] - Oncology Acute  General Surgery  Progress Note    Subjective:     POD#2 s/p open ventral hernia repair    AF, VS reviewed. Hypotensive overnight and hypertensive this morning.    Frequent non bloody emesis beginning this morning about 4am. Multiple episodes of emesis occurred while rounding.    Not passing flatus yet, no BMs.   Nursing staff reported no bowel sounds on exam.    Pain improving, patient pushing PCA less.       Post-Op Info:  Procedure(s) (LRB):  REPAIR, HERNIA, VENTRAL (N/A)   2 Days Post-Op      Medications:  Continuous Infusions:   0.9% NaCl   Intravenous Continuous        hydromorphone in 0.9 % NaCl 6 mg/30 ml   Intravenous Continuous   New Syringe/Bag at 07/24/24 1951    lactated ringers   Intravenous Continuous 125 mL/hr at 07/24/24 0809 Rate Verify at 07/24/24 0809    lactated ringers   Intravenous Continuous 125 mL/hr at 07/23/24 1538 New Bag at 07/23/24 1538     Scheduled Meds:   acetaminophen  1,000 mg Oral Q8H    enoxaparin  30 mg Subcutaneous Daily    ketorolac  30 mg Intravenous Q6H    mupirocin   Nasal BID    pantoprazole  40 mg Intravenous Daily     PRN Meds:  Current Facility-Administered Medications:     ceFAZolin (Ancef) IV (PEDS and ADULTS), 2 g, Intravenous, On Call Procedure    cloNIDine 0.1 mg/24 hr td ptwk, 1 patch, Transdermal, Once PRN    dextrose 10%, 12.5 g, Intravenous, PRN    dextrose 10%, 25 g, Intravenous, PRN    labetalol, 10 mg, Intravenous, Q2H PRN    LORazepam, 0.5 mg, Oral, Q6H PRN    naloxone, 0.02 mg, Intravenous, PRN    ondansetron, 4 mg, Oral, Q4H PRN    ondansetron, 4 mg, Intravenous, Q4H PRN    prochlorperazine, 5 mg, Intravenous, Q4H PRN     Objective:     Vital Signs (Most Recent):  Temp: 98 °F (36.7 °C) (07/25/24 0325)  Pulse: (!) 113 (07/25/24 0325)  Resp: 18 (07/24/24 1951)  BP: (!) 143/85 (07/25/24 0325)  SpO2: 99 % (07/25/24 0325) Vital Signs (24h Range):  Temp:  [97.6 °F (36.4 °C)-98.5 °F (36.9 °C)] 98 °F (36.7 °C)  Pulse:   [] 113  Resp:  [14-18] 18  SpO2:  [95 %-99 %] 99 %  BP: ()/(55-85) 143/85       Intake/Output Summary (Last 24 hours) at 7/25/2024 0647  Last data filed at 7/24/2024 0809  Gross per 24 hour   Intake 971.26 ml   Output --   Net 971.26 ml       Physical Exam  Constitutional:       Appearance: Normal appearance. She is ill-appearing.   Cardiovascular:      Rate and Rhythm: Normal rate and regular rhythm.   Pulmonary:      Effort: Pulmonary effort is normal. No respiratory distress.   Abdominal:      General: Abdomen is flat. There is no distension.      Palpations: Abdomen is soft.      Tenderness: There is abdominal tenderness. There is no guarding or rebound.      Comments: Dressing with some bloody seepage towards inferior of incision. No active bleeding.     Bowel sounds absent.    Skin:     General: Skin is warm and dry.   Neurological:      Mental Status: She is alert.         Significant Labs:  CBC:   Recent Labs   Lab 07/24/24  0514 07/25/24  0403   WBC 12.21*  --    RBC 4.13*  --    HGB 12.7 12.5   HCT 40.9 39.9     --    MCV 99.0*  --    MCH 30.8  --    MCHC 31.1*  --      CMP:   Recent Labs   Lab 07/25/24  0403   CALCIUM 8.8   ALBUMIN 3.5      K 4.9   CO2 23      BUN 26.2*   CREATININE 1.66*   ALKPHOS 78   ALT 5   AST 34   BILITOT 0.4       Significant Diagnostics:  None    Assessment/Plan:     There are no hospital problems to display for this patient.    POD#2  -concerns for post operative ileus v. bowel obstruction  - XR KUB, NPO diet, antiemetics PRN, and consider NGT if not improving with supportive measures  -discontinue PCA pump  -IV meperidine and IV Toradol ordered for pain   -change incision dressing today   -hypotension overnight likely due to dilaudid PCA pump  -BUN/Cr trending downward today, Cr 2.34 -> 1.66  -ambulate  -incentive spirometry          João Gaffney MD  General Surgery, PGY-1  [facility]

## 2024-07-25 NOTE — NURSING
Nurses Note -- 4 Eyes      7/25/2024   1:40 PM      Skin assessed during: Q shift      [] No Altered Skin Integrity Present    []Prevention Measures Documented      [x] Yes- Altered Skin Integrity Present or Discovered   [] LDA Added if Not in Epic (Describe Wound)   [x] New Altered Skin Integrity was Present on Admit and Documented in LDA   [] Wound Image Taken    Wound Care Consulted? No    Attending Nurse:  Trudy Monte RN    Second RN/Staff Member:   FIFI Darden

## 2024-07-26 LAB
ALBUMIN SERPL-MCNC: 3.4 G/DL (ref 3.4–4.8)
ALBUMIN/GLOB SERPL: 1.3 RATIO (ref 1.1–2)
ALP SERPL-CCNC: 74 UNIT/L (ref 40–150)
ALT SERPL-CCNC: 11 UNIT/L (ref 0–55)
ANION GAP SERPL CALC-SCNC: 15 MEQ/L
AST SERPL-CCNC: 45 UNIT/L (ref 5–34)
BILIRUB SERPL-MCNC: 0.7 MG/DL
BUN SERPL-MCNC: 22.2 MG/DL (ref 9.8–20.1)
CALCIUM SERPL-MCNC: 9.2 MG/DL (ref 8.4–10.2)
CHLORIDE SERPL-SCNC: 102 MMOL/L (ref 98–107)
CO2 SERPL-SCNC: 22 MMOL/L (ref 23–31)
CREAT SERPL-MCNC: 0.99 MG/DL (ref 0.55–1.02)
CREAT/UREA NIT SERPL: 22
GFR SERPLBLD CREATININE-BSD FMLA CKD-EPI: >60 ML/MIN/1.73/M2
GLOBULIN SER-MCNC: 2.7 GM/DL (ref 2.4–3.5)
GLUCOSE SERPL-MCNC: 252 MG/DL (ref 82–115)
POTASSIUM SERPL-SCNC: 4.7 MMOL/L (ref 3.5–5.1)
PROT SERPL-MCNC: 6.1 GM/DL (ref 5.8–7.6)
SODIUM SERPL-SCNC: 139 MMOL/L (ref 136–145)

## 2024-07-26 PROCEDURE — 94799 UNLISTED PULMONARY SVC/PX: CPT

## 2024-07-26 PROCEDURE — 63600175 PHARM REV CODE 636 W HCPCS: Performed by: SURGERY

## 2024-07-26 PROCEDURE — 36415 COLL VENOUS BLD VENIPUNCTURE: CPT

## 2024-07-26 PROCEDURE — 99223 1ST HOSP IP/OBS HIGH 75: CPT | Mod: ,,, | Performed by: SURGERY

## 2024-07-26 PROCEDURE — 25000003 PHARM REV CODE 250: Performed by: SURGERY

## 2024-07-26 PROCEDURE — 11000001 HC ACUTE MED/SURG PRIVATE ROOM

## 2024-07-26 PROCEDURE — 25000003 PHARM REV CODE 250

## 2024-07-26 PROCEDURE — 99900031 HC PATIENT EDUCATION (STAT)

## 2024-07-26 PROCEDURE — 63600175 PHARM REV CODE 636 W HCPCS

## 2024-07-26 PROCEDURE — 80053 COMPREHEN METABOLIC PANEL: CPT

## 2024-07-26 RX ORDER — INSULIN ASPART 100 [IU]/ML
0-5 INJECTION, SOLUTION INTRAVENOUS; SUBCUTANEOUS EVERY 6 HOURS PRN
Status: DISCONTINUED | OUTPATIENT
Start: 2024-07-26 | End: 2024-07-29

## 2024-07-26 RX ORDER — SIMETHICONE 80 MG
1 TABLET,CHEWABLE ORAL 3 TIMES DAILY PRN
Status: DISCONTINUED | OUTPATIENT
Start: 2024-07-26 | End: 2024-07-27

## 2024-07-26 RX ORDER — GLUCAGON 1 MG
1 KIT INJECTION
Status: DISCONTINUED | OUTPATIENT
Start: 2024-07-26 | End: 2024-07-31 | Stop reason: HOSPADM

## 2024-07-26 RX ORDER — LORAZEPAM 1 MG/1
2 TABLET ORAL ONCE
Status: COMPLETED | OUTPATIENT
Start: 2024-07-26 | End: 2024-07-26

## 2024-07-26 RX ORDER — LORAZEPAM 1 MG/1
1 TABLET ORAL EVERY 8 HOURS PRN
Status: DISCONTINUED | OUTPATIENT
Start: 2024-07-26 | End: 2024-07-27

## 2024-07-26 RX ORDER — LORAZEPAM 1 MG/1
TABLET ORAL
Status: DISPENSED
Start: 2024-07-26 | End: 2024-07-26

## 2024-07-26 RX ADMIN — PROCHLORPERAZINE EDISYLATE 5 MG: 5 INJECTION INTRAMUSCULAR; INTRAVENOUS at 12:07

## 2024-07-26 RX ADMIN — MUPIROCIN: 20 OINTMENT TOPICAL at 09:07

## 2024-07-26 RX ADMIN — ENOXAPARIN SODIUM 30 MG: 30 INJECTION SUBCUTANEOUS at 09:07

## 2024-07-26 RX ADMIN — PANTOPRAZOLE SODIUM 40 MG: 40 INJECTION, POWDER, FOR SOLUTION INTRAVENOUS at 09:07

## 2024-07-26 RX ADMIN — LORAZEPAM 1 MG: 1 TABLET ORAL at 04:07

## 2024-07-26 RX ADMIN — ONDANSETRON 4 MG: 2 INJECTION INTRAMUSCULAR; INTRAVENOUS at 08:07

## 2024-07-26 RX ADMIN — MUPIROCIN: 20 OINTMENT TOPICAL at 08:07

## 2024-07-26 RX ADMIN — KETOROLAC TROMETHAMINE 30 MG: 30 INJECTION, SOLUTION INTRAMUSCULAR at 12:07

## 2024-07-26 RX ADMIN — INSULIN ASPART 2 UNITS: 100 INJECTION, SOLUTION INTRAVENOUS; SUBCUTANEOUS at 12:07

## 2024-07-26 RX ADMIN — LORAZEPAM 2 MG: 1 TABLET ORAL at 09:07

## 2024-07-26 RX ADMIN — PROCHLORPERAZINE EDISYLATE 5 MG: 5 INJECTION INTRAMUSCULAR; INTRAVENOUS at 07:07

## 2024-07-26 NOTE — NURSING
Nurses Note -- 4 Eyes      7/26/2024   11:21 AM      Skin assessed during: Q Shift Change      [] No Altered Skin Integrity Present    []Prevention Measures Documented      [x] Yes- Altered Skin Integrity Present or Discovered   [] LDA Added if Not in Epic (Describe Wound)   [x] New Altered Skin Integrity was Present on Admit and Documented in LDA   [] Wound Image Taken    Wound Care Consulted? No    Attending Nurse:  Trudy Monte RN    Second RN/Staff Member:   Radha Robles RN

## 2024-07-26 NOTE — PROGRESS NOTES
Inpatient Nutrition Assessment    Admit Date: 7/23/2024   Total duration of encounter: 3 days   Patient Age: 64 y.o.    Nutrition Recommendation/Prescription     Diet NPO Except for: Sips with Medication ordered  PPN recommendations provided if needed (patient NPO/Clear Liquid >4 days on 7/28/2024):  Recommendations: Clinimix-E 4.25/5 @ 85 mL/hr  Kcal: 694 kcal/day (~33% est min kcal needs)  AA: 87 g/day (~105% est min protein needs)  Dextrose: 102 g/day (GIR: 0.85 mg/kg/min)  Fluid: 2040 mL/day (~98% est min fluid needs)  Will monitor appetite/PO intake, weight, and labs    Communication of Recommendations: reviewed with nurse, reviewed with patient, and reviewed with family    Nutrition Assessment     Malnutrition Assessment/Nutrition-Focused Physical Exam    Malnutrition Context: other (see comments) (Does not meet criteria at this time) (07/26/24 1349)  Malnutrition Level: other (see comments) (Does not meet criteria at this time) (07/26/24 1349)  Energy Intake (Malnutrition): less than or equal to 50% for greater than or equal to 5 days (07/26/24 1349)  Weight Loss (Malnutrition): other (see comments) (Does not meet criteria) (07/26/24 1349)  Subcutaneous Fat (Malnutrition): other (see comments) (Does not meet criteria) (07/26/24 1349)           Muscle Mass (Malnutrition): other (see comments) (Does not meet criteria) (07/26/24 1349)                          Fluid Accumulation (Malnutrition): other (see comments) (Does not meet criteria) (07/26/24 1349)        A minimum of two characteristics is recommended for diagnosis of either severe or non-severe malnutrition.    Chart Review    Reason Seen: continuous nutrition monitoring NPO/Clear Liquid diet >4 days on 7/28/2024    Malnutrition Screening Tool Results   Have you recently lost weight without trying?: No  Have you been eating poorly because of a decreased appetite?: No   MST Score: 0   Diagnosis:  - Open Incarcerated Ventral Hernia Repair      Relevant  Medical History:    Abdominal pain      Arthritis      Asthma      Back pain      Breast cancer, stage 1       lumpectomy    Breast pain      Chronic back pain      Claustrophobia      Constipation      CPAP (continuous positive airway pressure) dependence      Depression      Diabetes mellitus type I      Disorder of muscle      Dyspnea      Encounter for blood transfusion      Fibromyalgia      GERD (gastroesophageal reflux disease)      Hx of urinary tract infection      Hypercholesterolemia      Hyperlipidemia      Hypertension      Hypotension      IBS (irritable bowel syndrome)      Incarcerated ventral hernia      Memory loss      Muscular disorder      Necrotizing fasciitis      Osteoporosis      Panic attack      Personal history of colonic polyps 12/21/2018    Sleep apnea, unspecified      SOB (shortness of breath) on exertion      Urinary frequency      Ventral hernia      Weakness        Scheduled Medications:  acetaminophen, 1,000 mg, Q8H  enoxaparin, 30 mg, Daily  ketorolac, 30 mg, Q6H  LORazepam, ,   mupirocin, , BID  pantoprazole, 40 mg, Daily    Continuous Infusions:  0.9% NaCl  lactated ringers, Last Rate: 75 mL/hr at 07/25/24 2205  lactated ringers, Last Rate: 125 mL/hr at 07/26/24 1207    PRN Medications:  ceFAZolin (Ancef) IV (PEDS and ADULTS), 2 g, On Call Procedure  cloNIDine 0.1 mg/24 hr td ptwk, 1 patch, Once PRN  dextrose 10%, 12.5 g, PRN  dextrose 10%, 12.5 g, PRN  dextrose 10%, 25 g, PRN  dextrose 10%, 25 g, PRN  glucagon (human recombinant), 1 mg, PRN  insulin aspart U-100, 0-5 Units, Q6H PRN  labetalol, 10 mg, Q2H PRN  LORazepam, ,   LORazepam, 1 mg, Q8H PRN  naloxone, 0.02 mg, PRN  ondansetron, 4 mg, Q4H PRN  ondansetron, 4 mg, Q4H PRN  prochlorperazine, 5 mg, Q4H PRN  simethicone, 1 tablet, TID PRN    Calorie Containing IV Medications: no significant kcals from medications at this time    Recent Labs   Lab 07/24/24  0339 07/24/24  0514 07/25/24  0403 07/26/24  0402     --  137  "139   K 5.4*  --  4.9 4.7   CALCIUM 8.7  --  8.8 9.2   CO2 25  --  23 22*   BUN 24.3*  --  26.2* 22.2*   CREATININE 2.34*  --  1.66* 0.99   EGFRNORACEVR 23  --  34 >60   GLUCOSE 151*  --  110 252*   BILITOT  --   --  0.4 0.7   ALKPHOS  --   --  78 74   ALT  --   --  5 11   AST  --   --  34 45*   ALBUMIN  --   --  3.5 3.4   WBC  --  12.21*  --   --    HGB  --  12.7 12.5  --    HCT  --  40.9 39.9  --      Nutrition Orders:  Diet NPO Except for: Sips with Medication      Appetite/Oral Intake: NPO/NPO  Factors Affecting Nutritional Intake: NPO  Social Needs Impacting Access to Food: none identified  Food/Advent/Cultural Preferences: none reported  Food Allergies: none reported  Last Bowel Movement: (P) 24  Wound(s):  none noted    Comments    2024: Pt's  reports a poor appetite/PO intake ~5 days. Pt NPO with NGT to suction. Pt denies N/V and chewing/swallowing difficulties. Last BM noted. Pt wears dentures, reports the top is tight-fitting. Pt denies unplanned wt loss. Per EMR, pt weighed 77.1 kg on 2023. Provided PPN recommendations if needed. Will monitor.    Anthropometrics    Height: 5' 7" (170.2 cm), Height Method: Stated  Last Weight: 83.7 kg (184 lb 8.4 oz) (24 1159), Weight Method: Standard Scale  BMI (Calculated): 28.9  BMI Classification: overweight (BMI 25-29.9)     Ideal Body Weight (IBW), Female: 135 lb     % Ideal Body Weight, Female (lb): 136.69 %                    Usual Body Weight (UBW), k.1 kg  % Usual Body Weight: 108.79     Usual Weight Provided By: EMR weight history    Wt Readings from Last 5 Encounters:   24 83.7 kg (184 lb 8.4 oz)   24 84.8 kg (187 lb)   01/10/24 86.4 kg (190 lb 6.4 oz)   23 86.2 kg (190 lb)   23 77.1 kg (170 lb)     Weight Change(s) Since Admission:   2024: 83.7 kg  Wt Readings from Last 1 Encounters:   24 1159 83.7 kg (184 lb 8.4 oz)   24 1347 83.9 kg (185 lb)   Admit Weight: 83.9 kg (185 lb) " (07/17/24 1347), Weight Method: Stated    Estimated Needs    Weight Used For Calorie Calculations: 83.7 kg (184 lb 8.4 oz)  Energy Calorie Requirements (kcal): 2092 (25 kcal/kg)  Energy Need Method: Kcal/kg  Weight Used For Protein Calculations: 83.7 kg (184 lb 8.4 oz)  Protein Requirements:  (1.0-1.2 g/kg)  Fluid Requirements (mL): 2092 (1 mL/kcal)  CHO Requirement: 235-288 g (45-55% est min kcal needs)     Enteral Nutrition     Patient not receiving enteral nutrition at this time.    Parenteral Nutrition     Patient not receiving parenteral nutrition support at this time.    Evaluation of Received Nutrient Intake    Calories: not meeting estimated needs  Protein: not meeting estimated needs    Patient Education     Not applicable.    Nutrition Diagnosis     PES: Inadequate oral intake related to acute illness as evidenced by poor PO intake for ~5 days. (new)     Nutrition Interventions     Intervention(s): general/healthful diet, modified composition of parenteral nutrition, modified rate of parenteral nutrition, and collaboration with other providers    Goal: Meet greater than 80% of nutritional needs by follow-up. (new)  Goal: Maintain weight throughout hospitalization. (new)    Nutrition Goals & Monitoring     Dietitian will monitor: energy intake, weight, electrolyte/renal panel, glucose/endocrine profile, and gastrointestinal profile  Discharge planning: too early to determine; pending clinical course  Nutrition Risk/Follow-Up: high (follow-up in 1-4 days)   Please consult if re-assessment needed sooner.

## 2024-07-26 NOTE — PLAN OF CARE
Problem: Adult Inpatient Plan of Care  Goal: Plan of Care Review  Outcome: Progressing  Flowsheets (Taken 7/26/2024 0200)  Plan of Care Reviewed With:   patient   spouse  Goal: Patient-Specific Goal (Individualized)  Outcome: Progressing  Goal: Absence of Hospital-Acquired Illness or Injury  Outcome: Progressing  Intervention: Identify and Manage Fall Risk  Flowsheets (Taken 7/26/2024 0200)  Safety Promotion/Fall Prevention:   assistive device/personal item within reach   medications reviewed  Intervention: Prevent Skin Injury  Flowsheets (Taken 7/26/2024 0200)  Body Position: position changed independently  Device Skin Pressure Protection: absorbent pad utilized/changed  Intervention: Prevent and Manage VTE (Venous Thromboembolism) Risk  Flowsheets (Taken 7/26/2024 0200)  VTE Prevention/Management:   ambulation promoted   bleeding risk assessed   intravenous hydration  Intervention: Prevent Infection  Flowsheets (Taken 7/26/2024 0200)  Infection Prevention:   hand hygiene promoted   rest/sleep promoted   single patient room provided  Goal: Optimal Comfort and Wellbeing  Outcome: Progressing  Intervention: Monitor Pain and Promote Comfort  Flowsheets (Taken 7/26/2024 0200)  Pain Management Interventions:   care clustered   medication offered but refused  Intervention: Provide Person-Centered Care  Flowsheets (Taken 7/26/2024 0200)  Trust Relationship/Rapport:   care explained   empathic listening provided   reassurance provided   choices provided   questions answered   thoughts/feelings acknowledged   emotional support provided   questions encouraged  Goal: Readiness for Transition of Care  Outcome: Progressing     Problem: Wound  Goal: Optimal Coping  Outcome: Progressing  Goal: Optimal Functional Ability  Outcome: Progressing  Goal: Absence of Infection Signs and Symptoms  Outcome: Progressing  Goal: Improved Oral Intake  Outcome: Progressing  Goal: Optimal Pain Control and Function  Outcome:  Progressing  Intervention: Prevent or Manage Pain  Flowsheets (Taken 7/26/2024 0200)  Sleep/Rest Enhancement: regular sleep/rest pattern promoted  Pain Management Interventions:   care clustered   medication offered but refused  Goal: Skin Health and Integrity  Outcome: Progressing  Intervention: Optimize Skin Protection  Flowsheets (Taken 7/26/2024 0200)  Pressure Reduction Techniques: frequent weight shift encouraged  Activity Management: Rolling - L1  Head of Bed (HOB) Positioning: HOB at 30-45 degrees  Goal: Optimal Wound Healing  Outcome: Progressing  Intervention: Promote Wound Healing  Flowsheets (Taken 7/26/2024 0200)  Sleep/Rest Enhancement: regular sleep/rest pattern promoted     Problem: Skin Injury Risk Increased  Goal: Skin Health and Integrity  Outcome: Progressing  Intervention: Optimize Skin Protection  Flowsheets (Taken 7/26/2024 0200)  Pressure Reduction Techniques: frequent weight shift encouraged  Activity Management: Rolling - L1  Head of Bed (HOB) Positioning: HOB at 30-45 degrees

## 2024-07-26 NOTE — PROGRESS NOTES
[facility] - Oncology Acute  General Surgery  Progress Note    Subjective:     POD#3 s/p open ventral hernia repair    AF, VS reviewed. Hypertensive and tachycardic to 110s-120s overnight and hypertensive this morning. NAEON.     Patient complaining of anxiety, restlessness, tremors, agitation and continued N/V.    Emesis improving some with compazine and zofran. NGT to low intermittent suction with 800 drainage overnight. 500 this morning.     Reports having bowel movements this morning.     Pain improving, off PCA pump and now on PRN injectables.      Post-Op Info:  Procedure(s) (LRB):  REPAIR, HERNIA, VENTRAL (N/A)   3 Days Post-Op      Medications:  Continuous Infusions:   0.9% NaCl   Intravenous Continuous        lactated ringers   Intravenous Continuous 75 mL/hr at 07/25/24 2205 New Bag at 07/25/24 2205    lactated ringers   Intravenous Continuous 125 mL/hr at 07/25/24 1231 New Bag at 07/25/24 1231     Scheduled Meds:   acetaminophen  1,000 mg Oral Q8H    enoxaparin  30 mg Subcutaneous Daily    ketorolac  30 mg Intravenous Q6H    mupirocin   Nasal BID    pantoprazole  40 mg Intravenous Daily     PRN Meds:  Current Facility-Administered Medications:     ceFAZolin (Ancef) IV (PEDS and ADULTS), 2 g, Intravenous, On Call Procedure    cloNIDine 0.1 mg/24 hr td ptwk, 1 patch, Transdermal, Once PRN    dextrose 10%, 12.5 g, Intravenous, PRN    dextrose 10%, 25 g, Intravenous, PRN    labetalol, 10 mg, Intravenous, Q2H PRN    LORazepam, 0.5 mg, Oral, Q6H PRN    meperidine, 50 mg, Intravenous, Q4H PRN    naloxone, 0.02 mg, Intravenous, PRN    ondansetron, 4 mg, Oral, Q4H PRN    ondansetron, 4 mg, Intravenous, Q4H PRN    prochlorperazine, 5 mg, Intravenous, Q4H PRN     Objective:     Vital Signs (Most Recent):  Temp: 98.6 °F (37 °C) (07/26/24 0446)  Pulse: (!) 119 (07/26/24 0446)  Resp: 20 (07/26/24 0446)  BP: (!) 159/81 (07/26/24 0446)  SpO2: (!) 94 % (07/26/24 0450) Vital Signs (24h Range):  Temp:  [97.5  °F (36.4 °C)-98.6 °F (37 °C)] 98.6 °F (37 °C)  Pulse:  [113-124] 119  Resp:  [18-20] 20  SpO2:  [91 %-99 %] 94 %  BP: (127-159)/(63-83) 159/81       Intake/Output Summary (Last 24 hours) at 7/26/2024 0712  Last data filed at 7/26/2024 0104  Gross per 24 hour   Intake --   Output 1000 ml   Net -1000 ml       Physical Exam  Constitutional:       Appearance: Normal appearance. She is ill-appearing.   Cardiovascular:      Rate and Rhythm: Regular rhythm. Tachycardia present.   Pulmonary:      Effort: Pulmonary effort is normal. No respiratory distress.   Abdominal:      General: Abdomen is flat. There is distension.      Palpations: Abdomen is soft.      Tenderness: There is abdominal tenderness. There is no guarding or rebound.      Comments: Incision clean, draining serosanguinous fluid. Staple line intact.    Skin:     General: Skin is warm and dry.   Neurological:      Mental Status: She is alert.      Comments: Tremors         Significant Labs:  CBC:   Recent Labs   Lab 07/25/24  0403   HGB 12.5   HCT 39.9     CMP:   Recent Labs   Lab 07/26/24  0402   CALCIUM 9.2   ALBUMIN 3.4      K 4.7   CO2 22*      BUN 22.2*   CREATININE 0.99   ALKPHOS 74   ALT 11   AST 45*   BILITOT 0.7       Significant Diagnostics:  None    Assessment/Plan:     There are no hospital problems to display for this patient.    POD#3  -concerns for post operative ileus v. bowel obstruction  - XR KUB with non specific gas pattern  - NPO diet, antiemetics PRN  - NGT in place to low intermittent suction, bilious drainage  -repeat XR KUB due for worsening distension, check NG placement  - simethicone if in discomfort from abd distension  - IV meperidine and IV Toradol ordered for pain   - Concerns for benzodiazapine withdrawals, CIWA 13  - ativan 2mg tablet loading dose, ativan 1mg q8h tablet after  - can take home dose venlafaxine  - Hyperglycemia   -insulin sliding scale and glucose checks  - BUN/Cr stabilized  - ambulate  - incentive  spirometry  - IV fluids      João Gaffney MD  General Surgery, PGY-1  [facility]

## 2024-07-27 LAB
ALBUMIN SERPL-MCNC: 3.4 G/DL (ref 3.4–4.8)
ALBUMIN/GLOB SERPL: 1.3 RATIO (ref 1.1–2)
ALP SERPL-CCNC: 72 UNIT/L (ref 40–150)
ALT SERPL-CCNC: 17 UNIT/L (ref 0–55)
ANION GAP SERPL CALC-SCNC: 10 MEQ/L
AST SERPL-CCNC: 44 UNIT/L (ref 5–34)
BILIRUB SERPL-MCNC: 0.5 MG/DL
BUN SERPL-MCNC: 27 MG/DL (ref 9.8–20.1)
CALCIUM SERPL-MCNC: 9.2 MG/DL (ref 8.4–10.2)
CHLORIDE SERPL-SCNC: 102 MMOL/L (ref 98–107)
CO2 SERPL-SCNC: 30 MMOL/L (ref 23–31)
CREAT SERPL-MCNC: 0.98 MG/DL (ref 0.55–1.02)
CREAT/UREA NIT SERPL: 28
GFR SERPLBLD CREATININE-BSD FMLA CKD-EPI: >60 ML/MIN/1.73/M2
GLOBULIN SER-MCNC: 2.6 GM/DL (ref 2.4–3.5)
GLUCOSE SERPL-MCNC: 204 MG/DL (ref 70–110)
GLUCOSE SERPL-MCNC: 221 MG/DL (ref 82–115)
GLUCOSE SERPL-MCNC: 263 MG/DL (ref 70–110)
POTASSIUM SERPL-SCNC: 4.1 MMOL/L (ref 3.5–5.1)
PROT SERPL-MCNC: 6 GM/DL (ref 5.8–7.6)
SODIUM SERPL-SCNC: 142 MMOL/L (ref 136–145)

## 2024-07-27 PROCEDURE — 36415 COLL VENOUS BLD VENIPUNCTURE: CPT

## 2024-07-27 PROCEDURE — 25000003 PHARM REV CODE 250

## 2024-07-27 PROCEDURE — 63600175 PHARM REV CODE 636 W HCPCS: Performed by: SURGERY

## 2024-07-27 PROCEDURE — 94799 UNLISTED PULMONARY SVC/PX: CPT

## 2024-07-27 PROCEDURE — 80053 COMPREHEN METABOLIC PANEL: CPT

## 2024-07-27 PROCEDURE — 63600175 PHARM REV CODE 636 W HCPCS

## 2024-07-27 PROCEDURE — 99900035 HC TECH TIME PER 15 MIN (STAT)

## 2024-07-27 PROCEDURE — 25000003 PHARM REV CODE 250: Performed by: SURGERY

## 2024-07-27 PROCEDURE — 25000242 PHARM REV CODE 250 ALT 637 W/ HCPCS

## 2024-07-27 PROCEDURE — 94640 AIRWAY INHALATION TREATMENT: CPT

## 2024-07-27 PROCEDURE — 99223 1ST HOSP IP/OBS HIGH 75: CPT | Mod: ,,, | Performed by: SURGERY

## 2024-07-27 PROCEDURE — 99900031 HC PATIENT EDUCATION (STAT)

## 2024-07-27 PROCEDURE — 97161 PT EVAL LOW COMPLEX 20 MIN: CPT

## 2024-07-27 PROCEDURE — 94760 N-INVAS EAR/PLS OXIMETRY 1: CPT

## 2024-07-27 PROCEDURE — 11000001 HC ACUTE MED/SURG PRIVATE ROOM

## 2024-07-27 PROCEDURE — 27000221 HC OXYGEN, UP TO 24 HOURS

## 2024-07-27 RX ORDER — SIMETHICONE 80 MG
1 TABLET,CHEWABLE ORAL 3 TIMES DAILY
Status: DISCONTINUED | OUTPATIENT
Start: 2024-07-27 | End: 2024-07-31 | Stop reason: HOSPADM

## 2024-07-27 RX ORDER — LORAZEPAM 1 MG/1
1 TABLET ORAL EVERY 8 HOURS
Status: CANCELLED | OUTPATIENT
Start: 2024-07-27

## 2024-07-27 RX ORDER — MIDAZOLAM HYDROCHLORIDE 1 MG/ML
2 INJECTION, SOLUTION INTRAMUSCULAR; INTRAVENOUS EVERY 8 HOURS PRN
Status: DISCONTINUED | OUTPATIENT
Start: 2024-07-27 | End: 2024-07-27

## 2024-07-27 RX ORDER — LORAZEPAM 1 MG/1
1 TABLET ORAL EVERY 8 HOURS PRN
Status: DISCONTINUED | OUTPATIENT
Start: 2024-07-27 | End: 2024-07-31

## 2024-07-27 RX ORDER — ALBUTEROL SULFATE 0.83 MG/ML
2.5 SOLUTION RESPIRATORY (INHALATION) EVERY 4 HOURS PRN
Status: DISCONTINUED | OUTPATIENT
Start: 2024-07-27 | End: 2024-07-29

## 2024-07-27 RX ADMIN — MUPIROCIN: 20 OINTMENT TOPICAL at 09:07

## 2024-07-27 RX ADMIN — SIMETHICONE 80 MG: 80 TABLET, CHEWABLE ORAL at 08:07

## 2024-07-27 RX ADMIN — PANTOPRAZOLE SODIUM 40 MG: 40 INJECTION, POWDER, FOR SOLUTION INTRAVENOUS at 08:07

## 2024-07-27 RX ADMIN — ENOXAPARIN SODIUM 30 MG: 30 INJECTION SUBCUTANEOUS at 08:07

## 2024-07-27 RX ADMIN — ACETAMINOPHEN 1000 MG: 500 TABLET ORAL at 09:07

## 2024-07-27 RX ADMIN — LORAZEPAM 1 MG: 1 TABLET ORAL at 08:07

## 2024-07-27 RX ADMIN — MUPIROCIN: 20 OINTMENT TOPICAL at 08:07

## 2024-07-27 RX ADMIN — LABETALOL HYDROCHLORIDE 10 MG: 5 INJECTION, SOLUTION INTRAVENOUS at 08:07

## 2024-07-27 RX ADMIN — SIMETHICONE 80 MG: 80 TABLET, CHEWABLE ORAL at 02:07

## 2024-07-27 RX ADMIN — LORAZEPAM 1 MG: 1 TABLET ORAL at 05:07

## 2024-07-27 RX ADMIN — INSULIN ASPART 1 UNITS: 100 INJECTION, SOLUTION INTRAVENOUS; SUBCUTANEOUS at 12:07

## 2024-07-27 RX ADMIN — ONDANSETRON 4 MG: 2 INJECTION INTRAMUSCULAR; INTRAVENOUS at 11:07

## 2024-07-27 RX ADMIN — LABETALOL HYDROCHLORIDE 10 MG: 5 INJECTION, SOLUTION INTRAVENOUS at 04:07

## 2024-07-27 RX ADMIN — ALBUTEROL SULFATE 2.5 MG: 2.5 SOLUTION RESPIRATORY (INHALATION) at 09:07

## 2024-07-27 RX ADMIN — LORAZEPAM 1 MG: 1 TABLET ORAL at 12:07

## 2024-07-27 RX ADMIN — SODIUM CHLORIDE, POTASSIUM CHLORIDE, SODIUM LACTATE AND CALCIUM CHLORIDE: 600; 310; 30; 20 INJECTION, SOLUTION INTRAVENOUS at 11:07

## 2024-07-27 RX ADMIN — INSULIN ASPART 2 UNITS: 100 INJECTION, SOLUTION INTRAVENOUS; SUBCUTANEOUS at 12:07

## 2024-07-27 RX ADMIN — INSULIN ASPART 2 UNITS: 100 INJECTION, SOLUTION INTRAVENOUS; SUBCUTANEOUS at 05:07

## 2024-07-27 RX ADMIN — ALBUTEROL SULFATE 2.5 MG: 2.5 SOLUTION RESPIRATORY (INHALATION) at 01:07

## 2024-07-27 RX ADMIN — SIMETHICONE 80 MG: 80 TABLET, CHEWABLE ORAL at 09:07

## 2024-07-27 RX ADMIN — ALBUTEROL SULFATE 2.5 MG: 2.5 SOLUTION RESPIRATORY (INHALATION) at 04:07

## 2024-07-27 NOTE — PT/OT/SLP EVAL
Physical Therapy Evaluation and Discharge Note    Patient Name:  Beth Chaudhary   MRN:  [id number]    Recommendations:     Discharge therapy intensity: No Therapy Indicated   Discharge Equipment Recommendations: none   Barriers to discharge: Ongoing medical needs    Assessment:     Beth Chaudhary is a 64 y.o. female admitted with a medical diagnosis of incarcerated ventral hernia. S/p hernia repair on 7/23 .  At this time, patient is functioning at their prior level of function and does not require further acute PT services.     Recent Surgery: Procedure(s) (LRB):  REPAIR, HERNIA, VENTRAL (N/A) 4 Days Post-Op    Plan:     During this hospitalization, patient does not require further acute PT services.  Please re-consult if situation changes.      Subjective     Chief Complaint: none  Patient/Family Comments/goals: none  Pain/Comfort:  Pain Rating 1: 0/10    Patients cultural, spiritual, Moravian conflicts given the current situation: no    Living Environment:  Lives with spouse in a [facility].   Prior to admission, patients level of function was independent.  Equipment used at home: glucometer, CPAP.  DME owned (not currently used): none.  Upon discharge, patient will have assistance from spouse.    Objective:     Communicated with nurse prior to session.  Patient found supine with telemetry, peripheral IV, NG tube upon PT entry to room.    General Precautions: Standard,      Orthopedic Precautions:N/A   Braces: N/A  Respiratory Status: Room air    Exams:  Cognitive Exam:  Patient is oriented to Person, Place, Time, and Situation  Sensation: -       Intact  RLE ROM: WFL  RLE Strength: WFL  LLE ROM: WFL  LLE Strength: WFL    Functional Mobility:  Bed Mobility:  Supine to Sit: independence  Sit to Supine: independence  Transfers:  Sit to Stand:  independence with no AD  Gait: 15 ft independently. Limited by NGT attached to suction. Patient reported ambulating down koenig with spouse earlier in the day.   Balance:  good    AM-PAC 6 CLICK MOBILITY  Total Score:24     Education Provided:  Role and goals of PT, transfer training, bed mobility, gait training, balance training, safety awareness, assistive device, strengthening exercises, and importance of participating in PT to return to PLOF.    Patient left supine with all lines intact, call button in reach, and spouse present.    GOALS:   Multidisciplinary Problems       Physical Therapy Goals       Not on file                    History:     Past Medical History:   Diagnosis Date    Abdominal pain     Arthritis     Asthma     Back pain     Breast cancer, stage 1     lumpectomy    Breast pain     Chronic back pain     Claustrophobia     Constipation     CPAP (continuous positive airway pressure) dependence     Depression     Diabetes mellitus type I     Disorder of muscle     Dyspnea     Encounter for blood transfusion     Fibromyalgia     GERD (gastroesophageal reflux disease)     Hx of urinary tract infection     Hypercholesterolemia     Hyperlipidemia     Hypertension     Hypotension     IBS (irritable bowel syndrome)     Incarcerated ventral hernia     Memory loss     Muscular disorder     Necrotizing fasciitis     Osteoporosis     Panic attack     Personal history of colonic polyps 12/21/2018    Sleep apnea, unspecified     SOB (shortness of breath) on exertion     Urinary frequency     Ventral hernia     Weakness        Past Surgical History:   Procedure Laterality Date    APPENDECTOMY      CHOLECYSTECTOMY      COLONOSCOPY N/A 12/21/2018    FACIAL RECONSTRUCTION SURGERY      LUMPECTOMY, BREAST Left     necrotizing fasciitis      REPAIR, HERNIA, VENTRAL N/A 7/23/2024    Procedure: REPAIR, HERNIA, VENTRAL;  Surgeon: Yao Singletary Jr., MD;  Location: [facility];  Service: General;  Laterality: N/A;    TONSILLECTOMY      VENTRAL HERNIA REPAIR  03/03/2020       Time Tracking:     PT Received On: 07/27/24  PT Start Time: 1037     PT Stop Time: 1050  PT Total Time (min): 13 min      Billable Minutes: Evaluation 13 minutes      07/27/2024

## 2024-07-27 NOTE — PROGRESS NOTES
[facility] - Oncology Acute  General Surgery  Progress Note    Subjective:     POD# 4 s/p open ventral hernia repair    AF, VS reviewed. Hypertensive and tachycardic to 110s-120s overnight.     SOB, wheezing this morning. CXR with no acute changes. Improved with nebulizer. Reports not sleeping last night.     Patient complaining of restlessness and tremors.     Nausea improved. NGT to low intermittent suction with -2300 in past 24h    Reports no bowel movements this morning and still not passing flatus.     Pain managed well with PRN injectables.      Post-Op Info:  Procedure(s) (LRB):  REPAIR, HERNIA, VENTRAL (N/A)   4 Days Post-Op      Medications:  Continuous Infusions:   lactated ringers   Intravenous Continuous 125 mL/hr at 07/26/24 1207 Rate Change at 07/26/24 1207     Scheduled Meds:   acetaminophen  1,000 mg Oral Q8H    enoxaparin  30 mg Subcutaneous Daily    mupirocin   Nasal BID    pantoprazole  40 mg Intravenous Daily    simethicone  1 tablet Oral TID    venlafaxine  225 mg Oral QAM     PRN Meds:  Current Facility-Administered Medications:     albuterol sulfate, 2.5 mg, Nebulization, Q4H PRN    ceFAZolin (Ancef) IV (PEDS and ADULTS), 2 g, Intravenous, On Call Procedure    cloNIDine 0.1 mg/24 hr td ptwk, 1 patch, Transdermal, Once PRN    dextrose 10%, 12.5 g, Intravenous, PRN    dextrose 10%, 12.5 g, Intravenous, PRN    dextrose 10%, 25 g, Intravenous, PRN    dextrose 10%, 25 g, Intravenous, PRN    glucagon (human recombinant), 1 mg, Intramuscular, PRN    insulin aspart U-100, 0-5 Units, Subcutaneous, Q6H PRN    labetalol, 10 mg, Intravenous, Q2H PRN    midazolam, 2 mg, Intravenous, Q8H PRN    naloxone, 0.02 mg, Intravenous, PRN    ondansetron, 4 mg, Oral, Q4H PRN    ondansetron, 4 mg, Intravenous, Q4H PRN    prochlorperazine, 5 mg, Intravenous, Q4H PRN     Objective:     Vital Signs (Most Recent):  Temp: 97.9 °F (36.6 °C) (07/27/24 0732)  Pulse: 105 (07/27/24 0732)  Resp: 18 (07/27/24  "0732)  BP: (!) 166/106 (07/27/24 0732)  SpO2: (!) 93 % (07/27/24 0732) Vital Signs (24h Range):  Temp:  [97.5 °F (36.4 °C)-98.6 °F (37 °C)] 97.9 °F (36.6 °C)  Pulse:  [101-131] 105  Resp:  [18-20] 18  SpO2:  [88 %-100 %] 93 %  BP: (142-166)/() 166/106       Intake/Output Summary (Last 24 hours) at 7/27/2024 0843  Last data filed at 7/27/2024 0600  Gross per 24 hour   Intake --   Output 2300 ml   Net -2300 ml       Physical Exam  Vitals reviewed.   Constitutional:       General: She is not in acute distress.     Appearance: Normal appearance.   Cardiovascular:      Rate and Rhythm: Regular rhythm. Tachycardia present.   Pulmonary:      Effort: Pulmonary effort is normal. No respiratory distress.   Abdominal:      General: Abdomen is flat. Bowel sounds are increased. There is distension.      Palpations: Abdomen is soft.      Tenderness: There is abdominal tenderness. There is no guarding or rebound.      Comments: Incision clean, draining serosanguinous fluid. Staple line intact.    Skin:     General: Skin is warm and dry.   Neurological:      Mental Status: She is alert.      Motor: Tremor present.   Psychiatric:         Attention and Perception: Attention normal.         Mood and Affect: Mood is anxious.         Behavior: Behavior is not agitated.         Thought Content: Thought content normal.         Significant Labs:  CBC:   No results for input(s): "WBC", "RBC", "HGB", "HCT", "PLT", "MCV", "MCH", "MCHC" in the last 48 hours.    CMP:   Recent Labs   Lab 07/27/24  0308   CALCIUM 9.2   ALBUMIN 3.4      K 4.1   CO2 30      BUN 27.0*   CREATININE 0.98   ALKPHOS 72   ALT 17   AST 44*   BILITOT 0.5       Significant Diagnostics:  None    Assessment/Plan:     There are no hospital problems to display for this patient.    POD#4  -concerns for post operative ileus v. bowel obstruction  - XR KUB with non specific gas pattern  - NPO diet, antiemetics PRN  - PT for mobility  - scheduled simethicone  - " advised to use chewing gum to promote bowel activity  - IV meperidine and IV Toradol ordered PRN for pain   - Concerns for benzodiazapine withdrawals, CIWA 10  - improved with 2mg ativan PO loading dose  - no IV benzodiazapine choices for use on floor due to ativan shortage and level of care, discussed alternatives with pharmacy in length  - agreed on ativan 1mg q8h sublingual, if not tolerating, okay to give via NG (see order for administration instructions)  - can take home dose venlafaxine  - SOB overnight, no desaturation, likely related to anxiety / withdrawals, CXR clear this morning  - Hyperglycemia   -low dose insulin sliding scale and glucose checks  - ambulate  - incentive spirometry  - IV fluids      João Gaffney MD  General Surgery, PGY-1  [facility]

## 2024-07-27 NOTE — NURSING
Nurses Note -- 4 Eyes      7/27/2024   8:00am      Skin assessed during: Admit      [x] No New Altered Skin Integrity Present    [x]Prevention Measures Documented      [] Yes- Altered Skin Integrity Present or Discovered   [] LDA Added if Not in Epic (Describe Wound)   [] New Altered Skin Integrity was Present on Admit and Documented in LDA   [] Wound Image Taken    Wound Care Consulted? No    Attending Nurse:  Paolo Nguyen RN    Second RN/Staff Member:  Radha Robles RN

## 2024-07-27 NOTE — PLAN OF CARE
Problem: Adult Inpatient Plan of Care  Goal: Plan of Care Review  Outcome: Progressing  Flowsheets (Taken 7/27/2024 1246)  Plan of Care Reviewed With:   patient   spouse  Goal: Patient-Specific Goal (Individualized)  Outcome: Progressing  Goal: Absence of Hospital-Acquired Illness or Injury  Outcome: Progressing  Intervention: Identify and Manage Fall Risk  Flowsheets (Taken 7/27/2024 1246)  Safety Promotion/Fall Prevention:   assistive device/personal item within reach   medications reviewed   nonskid shoes/socks when out of bed   side rails raised x 3  Intervention: Prevent Skin Injury  Flowsheets (Taken 7/27/2024 1246)  Body Position: position changed independently  Skin Protection: incontinence pads utilized  Device Skin Pressure Protection:   absorbent pad utilized/changed   tubing/devices free from skin contact  Intervention: Prevent and Manage VTE (Venous Thromboembolism) Risk  Flowsheets (Taken 7/27/2024 1246)  VTE Prevention/Management:   ambulation promoted   bleeding risk assessed   ROM (active) performed  Intervention: Prevent Infection  Flowsheets (Taken 7/27/2024 1246)  Infection Prevention:   rest/sleep promoted   single patient room provided  Goal: Optimal Comfort and Wellbeing  Outcome: Progressing  Intervention: Monitor Pain and Promote Comfort  Flowsheets (Taken 7/27/2024 1246)  Pain Management Interventions:   care clustered   medication offered   pain management plan reviewed with patient/caregiver   pillow support provided   position adjusted  Intervention: Provide Person-Centered Care  Flowsheets (Taken 7/27/2024 1246)  Trust Relationship/Rapport: care explained  Goal: Readiness for Transition of Care  Outcome: Progressing     Problem: Wound  Goal: Optimal Coping  Outcome: Progressing  Intervention: Support Patient and Family Response  Flowsheets (Taken 7/27/2024 1246)  Supportive Measures:   active listening utilized   relaxation techniques promoted  Goal: Optimal Functional  Ability  Outcome: Progressing  Intervention: Optimize Functional Ability  Flowsheets (Taken 7/27/2024 1246)  Activity Management: Ambulated to bathroom - L4  Activity Assistance Provided: independent  Goal: Absence of Infection Signs and Symptoms  Outcome: Progressing  Intervention: Prevent or Manage Infection  Flowsheets (Taken 7/27/2024 1246)  Infection Management: aseptic technique maintained  Goal: Improved Oral Intake  Outcome: Progressing  Intervention: Promote and Optimize Oral Intake  Flowsheets (Taken 7/27/2024 1246)  Oral Nutrition Promotion: rest periods promoted  Goal: Optimal Pain Control and Function  Outcome: Progressing  Intervention: Prevent or Manage Pain  Flowsheets (Taken 7/27/2024 1246)  Sleep/Rest Enhancement:   regular sleep/rest pattern promoted   relaxation techniques promoted  Pain Management Interventions:   care clustered   medication offered   pain management plan reviewed with patient/caregiver   pillow support provided   position adjusted  Goal: Skin Health and Integrity  Outcome: Progressing  Intervention: Optimize Skin Protection  Flowsheets (Taken 7/27/2024 1246)  Pressure Reduction Techniques:   frequent weight shift encouraged   weight shift assistance provided  Skin Protection: incontinence pads utilized  Activity Management: Ambulated to bathroom - L4  Head of Bed (HOB) Positioning: HOB at 30-45 degrees  Goal: Optimal Wound Healing  Outcome: Progressing  Intervention: Promote Wound Healing  Flowsheets (Taken 7/27/2024 1246)  Sleep/Rest Enhancement:   regular sleep/rest pattern promoted   relaxation techniques promoted     Problem: Fall Injury Risk  Goal: Absence of Fall and Fall-Related Injury  Outcome: Progressing  Intervention: Identify and Manage Contributors  Flowsheets (Taken 7/27/2024 1246)  Self-Care Promotion: independence encouraged  Medication Review/Management: medications reviewed  Intervention: Promote Injury-Free Environment  Flowsheets (Taken 7/27/2024  1246)  Safety Promotion/Fall Prevention:   assistive device/personal item within reach   medications reviewed   nonskid shoes/socks when out of bed   side rails raised x 3     Problem: Skin Injury Risk Increased  Goal: Skin Health and Integrity  Outcome: Progressing  Intervention: Optimize Skin Protection  Flowsheets (Taken 7/27/2024 1246)  Pressure Reduction Techniques:   frequent weight shift encouraged   weight shift assistance provided  Skin Protection: incontinence pads utilized  Activity Management: Ambulated to bathroom - L4  Head of Bed (HOB) Positioning: HOB at 30-45 degrees  Intervention: Promote and Optimize Oral Intake  Flowsheets (Taken 7/27/2024 1246)  Oral Nutrition Promotion: rest periods promoted

## 2024-07-28 LAB
ALBUMIN SERPL-MCNC: 3.1 G/DL (ref 3.4–4.8)
ALBUMIN/GLOB SERPL: 1 RATIO (ref 1.1–2)
ALP SERPL-CCNC: 66 UNIT/L (ref 40–150)
ALT SERPL-CCNC: 19 UNIT/L (ref 0–55)
ANION GAP SERPL CALC-SCNC: 8 MEQ/L
AST SERPL-CCNC: 23 UNIT/L (ref 5–34)
BILIRUB SERPL-MCNC: 0.6 MG/DL
BUN SERPL-MCNC: 27.7 MG/DL (ref 9.8–20.1)
CALCIUM SERPL-MCNC: 9.5 MG/DL (ref 8.4–10.2)
CHLORIDE SERPL-SCNC: 99 MMOL/L (ref 98–107)
CO2 SERPL-SCNC: 34 MMOL/L (ref 23–31)
CREAT SERPL-MCNC: 0.83 MG/DL (ref 0.55–1.02)
CREAT/UREA NIT SERPL: 33
GFR SERPLBLD CREATININE-BSD FMLA CKD-EPI: >60 ML/MIN/1.73/M2
GLOBULIN SER-MCNC: 3 GM/DL (ref 2.4–3.5)
GLUCOSE SERPL-MCNC: 156 MG/DL (ref 70–110)
GLUCOSE SERPL-MCNC: 180 MG/DL (ref 70–110)
GLUCOSE SERPL-MCNC: 180 MG/DL (ref 70–110)
GLUCOSE SERPL-MCNC: 207 MG/DL (ref 82–115)
HCT VFR BLD AUTO: 32.3 % (ref 37–47)
HGB BLD-MCNC: 10.7 G/DL (ref 12–16)
MAGNESIUM SERPL-MCNC: 1.9 MG/DL (ref 1.6–2.6)
PHOSPHATE SERPL-MCNC: 2.6 MG/DL (ref 2.3–4.7)
POTASSIUM SERPL-SCNC: 3.7 MMOL/L (ref 3.5–5.1)
PROT SERPL-MCNC: 6.1 GM/DL (ref 5.8–7.6)
SODIUM SERPL-SCNC: 141 MMOL/L (ref 136–145)

## 2024-07-28 PROCEDURE — 63600175 PHARM REV CODE 636 W HCPCS

## 2024-07-28 PROCEDURE — 36415 COLL VENOUS BLD VENIPUNCTURE: CPT

## 2024-07-28 PROCEDURE — 11000001 HC ACUTE MED/SURG PRIVATE ROOM

## 2024-07-28 PROCEDURE — 27000173 HC ACAPELLA DEVICE DH OR DM

## 2024-07-28 PROCEDURE — 85014 HEMATOCRIT: CPT

## 2024-07-28 PROCEDURE — 84100 ASSAY OF PHOSPHORUS: CPT

## 2024-07-28 PROCEDURE — 94664 DEMO&/EVAL PT USE INHALER: CPT

## 2024-07-28 PROCEDURE — 25000003 PHARM REV CODE 250

## 2024-07-28 PROCEDURE — 80053 COMPREHEN METABOLIC PANEL: CPT

## 2024-07-28 PROCEDURE — 85018 HEMOGLOBIN: CPT

## 2024-07-28 PROCEDURE — 25000003 PHARM REV CODE 250: Performed by: SURGERY

## 2024-07-28 PROCEDURE — 63600175 PHARM REV CODE 636 W HCPCS: Performed by: SURGERY

## 2024-07-28 PROCEDURE — 99900035 HC TECH TIME PER 15 MIN (STAT)

## 2024-07-28 PROCEDURE — 83735 ASSAY OF MAGNESIUM: CPT

## 2024-07-28 RX ORDER — KETOROLAC TROMETHAMINE 30 MG/ML
30 INJECTION, SOLUTION INTRAMUSCULAR; INTRAVENOUS EVERY 6 HOURS PRN
Status: ACTIVE | OUTPATIENT
Start: 2024-07-28 | End: 2024-07-31

## 2024-07-28 RX ORDER — INSULIN GLARGINE 100 [IU]/ML
5 INJECTION, SOLUTION SUBCUTANEOUS DAILY
Status: DISCONTINUED | OUTPATIENT
Start: 2024-07-28 | End: 2024-07-31

## 2024-07-28 RX ADMIN — MUPIROCIN: 20 OINTMENT TOPICAL at 08:07

## 2024-07-28 RX ADMIN — PANTOPRAZOLE SODIUM 40 MG: 40 INJECTION, POWDER, FOR SOLUTION INTRAVENOUS at 08:07

## 2024-07-28 RX ADMIN — LORAZEPAM 1 MG: 1 TABLET ORAL at 11:07

## 2024-07-28 RX ADMIN — SODIUM CHLORIDE, POTASSIUM CHLORIDE, SODIUM LACTATE AND CALCIUM CHLORIDE: 600; 310; 30; 20 INJECTION, SOLUTION INTRAVENOUS at 05:07

## 2024-07-28 RX ADMIN — SODIUM CHLORIDE, POTASSIUM CHLORIDE, SODIUM LACTATE AND CALCIUM CHLORIDE: 600; 310; 30; 20 INJECTION, SOLUTION INTRAVENOUS at 12:07

## 2024-07-28 RX ADMIN — LORAZEPAM 1 MG: 1 TABLET ORAL at 03:07

## 2024-07-28 RX ADMIN — SIMETHICONE 80 MG: 80 TABLET, CHEWABLE ORAL at 02:07

## 2024-07-28 RX ADMIN — LABETALOL HYDROCHLORIDE 10 MG: 5 INJECTION, SOLUTION INTRAVENOUS at 08:07

## 2024-07-28 RX ADMIN — INSULIN ASPART 2 UNITS: 100 INJECTION, SOLUTION INTRAVENOUS; SUBCUTANEOUS at 06:07

## 2024-07-28 RX ADMIN — SIMETHICONE 80 MG: 80 TABLET, CHEWABLE ORAL at 08:07

## 2024-07-28 RX ADMIN — LABETALOL HYDROCHLORIDE 10 MG: 5 INJECTION, SOLUTION INTRAVENOUS at 03:07

## 2024-07-28 RX ADMIN — ENOXAPARIN SODIUM 30 MG: 30 INJECTION SUBCUTANEOUS at 08:07

## 2024-07-28 RX ADMIN — ACETAMINOPHEN 1000 MG: 500 TABLET ORAL at 06:07

## 2024-07-28 RX ADMIN — LABETALOL HYDROCHLORIDE 10 MG: 5 INJECTION, SOLUTION INTRAVENOUS at 06:07

## 2024-07-28 RX ADMIN — VENLAFAXINE HYDROCHLORIDE 225 MG: 150 CAPSULE, EXTENDED RELEASE ORAL at 06:07

## 2024-07-28 RX ADMIN — CLONIDINE 1 PATCH: 0.1 PATCH TRANSDERMAL at 10:07

## 2024-07-28 RX ADMIN — LABETALOL HYDROCHLORIDE 10 MG: 5 INJECTION, SOLUTION INTRAVENOUS at 01:07

## 2024-07-28 RX ADMIN — SODIUM CHLORIDE, POTASSIUM CHLORIDE, SODIUM LACTATE AND CALCIUM CHLORIDE: 600; 310; 30; 20 INJECTION, SOLUTION INTRAVENOUS at 08:07

## 2024-07-28 RX ADMIN — INSULIN ASPART 1 UNITS: 100 INJECTION, SOLUTION INTRAVENOUS; SUBCUTANEOUS at 12:07

## 2024-07-28 RX ADMIN — INSULIN GLARGINE 5 UNITS: 100 INJECTION, SOLUTION SUBCUTANEOUS at 08:07

## 2024-07-28 RX ADMIN — LORAZEPAM 1 MG: 1 TABLET ORAL at 07:07

## 2024-07-28 NOTE — PROGRESS NOTES
[facility] - Oncology Acute  General Surgery  Progress Note    Subjective:     POD# 5 s/p open ventral hernia repair    AF, VS reviewed. Hypertensive. NAEON    Restlessness and tremors improving with ativan. Complaining of some continued SOB, improves with nebulizer.     Nausea improved. NGT to low intermittent suction with -800ml overnight.  Reports bowel movement yesterday evening and reports passing flatus now.    Pain managed well with PRN injectables and PO tylenol      Post-Op Info:  Procedure(s) (LRB):  REPAIR, HERNIA, VENTRAL (N/A)   5 Days Post-Op      Medications:  Continuous Infusions:   lactated ringers   Intravenous Continuous 125 mL/hr at 07/28/24 0047 New Bag at 07/28/24 0047     Scheduled Meds:   acetaminophen  1,000 mg Oral Q8H    enoxaparin  30 mg Subcutaneous Daily    insulin glargine U-100  5 Units Subcutaneous Daily    mupirocin   Nasal BID    pantoprazole  40 mg Intravenous Daily    simethicone  1 tablet Oral TID    venlafaxine  225 mg Oral QAM     PRN Meds:  Current Facility-Administered Medications:     albuterol sulfate, 2.5 mg, Nebulization, Q4H PRN    ceFAZolin (Ancef) IV (PEDS and ADULTS), 2 g, Intravenous, On Call Procedure    cloNIDine 0.1 mg/24 hr td ptwk, 1 patch, Transdermal, Once PRN    dextrose 10%, 12.5 g, Intravenous, PRN    dextrose 10%, 12.5 g, Intravenous, PRN    dextrose 10%, 25 g, Intravenous, PRN    dextrose 10%, 25 g, Intravenous, PRN    glucagon (human recombinant), 1 mg, Intramuscular, PRN    insulin aspart U-100, 0-5 Units, Subcutaneous, Q6H PRN    labetalol, 10 mg, Intravenous, Q2H PRN    LORazepam, 1 mg, Sublingual, Q8H PRN    naloxone, 0.02 mg, Intravenous, PRN    ondansetron, 4 mg, Oral, Q4H PRN    ondansetron, 4 mg, Intravenous, Q4H PRN    prochlorperazine, 5 mg, Intravenous, Q4H PRN     Objective:     Vital Signs (Most Recent):  Temp: 97.6 °F (36.4 °C) (07/28/24 0738)  Pulse: 76 (07/28/24 0738)  Resp: 18 (07/28/24 0738)  BP: (!) 161/93 (07/28/24  "0738)  SpO2: 100 % (07/28/24 0738) Vital Signs (24h Range):  Temp:  [97.6 °F (36.4 °C)-98.3 °F (36.8 °C)] 97.6 °F (36.4 °C)  Pulse:  [] 76  Resp:  [18-22] 18  SpO2:  [90 %-100 %] 100 %  BP: (150-185)/(73-94) 161/93       Intake/Output Summary (Last 24 hours) at 7/28/2024 0809  Last data filed at 7/27/2024 1716  Gross per 24 hour   Intake --   Output 1000 ml   Net -1000 ml       Physical Exam  Vitals reviewed.   Constitutional:       General: She is not in acute distress.     Appearance: Normal appearance.   Cardiovascular:      Rate and Rhythm: Regular rhythm. Tachycardia present.   Pulmonary:      Effort: Pulmonary effort is normal. No respiratory distress.   Abdominal:      General: Abdomen is flat. Bowel sounds are increased. There is distension.      Palpations: Abdomen is soft.      Tenderness: There is abdominal tenderness. There is no guarding or rebound.      Comments: Incision clean, draining serosanguinous fluid. Staple line intact.    Skin:     General: Skin is warm and dry.   Neurological:      Mental Status: She is alert.      Motor: Tremor present.   Psychiatric:         Attention and Perception: Attention normal.         Mood and Affect: Mood is anxious.         Behavior: Behavior is not agitated.         Thought Content: Thought content normal.         Significant Labs:  CBC:   No results for input(s): "WBC", "RBC", "HGB", "HCT", "PLT", "MCV", "MCH", "MCHC" in the last 48 hours.    CMP:   Recent Labs   Lab 07/28/24  0407   CALCIUM 9.5   ALBUMIN 3.1*      K 3.7   CO2 34*   CL 99   BUN 27.7*   CREATININE 0.83   ALKPHOS 66   ALT 19   AST 23   BILITOT 0.6       Significant Diagnostics:  None    Assessment/Plan:     There are no hospital problems to display for this patient.    POD#5  -concerns for post operative ileus v. bowel obstruction  - XR KUB with non specific gas pattern  - NPO diet, antiemetics PRN  - PT for mobility  - scheduled simethicone  - advised to use chewing gum to promote " bowel activity   - Mg, Phos    - NGT clamp trial, 4h  - IV meperidine and IV Toradol ordered PRN for pain   - Concerns for benzodiazapine withdrawals, CIWA 10  - ativan 1mg q8h sublingual, if not tolerating, okay to give via NG (see order for administration instructions)  - can take home dose venlafaxine  - withdrawal symptoms improving  - Hyperglycemia   -low dose insulin sliding scale and glucose checks   -add 5 units lantus qd   - ambulate  - incentive spirometry  - IV fluids      João Gaffney MD  General Surgery, PGY-1  [facility]

## 2024-07-28 NOTE — PLAN OF CARE
Problem: Adult Inpatient Plan of Care  Goal: Plan of Care Review  Outcome: Progressing  Flowsheets (Taken 7/27/2024 2215)  Plan of Care Reviewed With:   patient   spouse  Goal: Patient-Specific Goal (Individualized)  Outcome: Progressing  Goal: Absence of Hospital-Acquired Illness or Injury  Outcome: Progressing  Intervention: Identify and Manage Fall Risk  Flowsheets (Taken 7/27/2024 2215)  Safety Promotion/Fall Prevention:   assistive device/personal item within reach   side rails raised x 2  Intervention: Prevent Skin Injury  Flowsheets (Taken 7/27/2024 2215)  Body Position: position changed independently  Skin Protection: protective footwear used  Device Skin Pressure Protection: tubing/devices free from skin contact  Intervention: Prevent and Manage VTE (Venous Thromboembolism) Risk  Flowsheets (Taken 7/27/2024 2215)  VTE Prevention/Management:   remove, assess skin, and reapply sequential compression device   ROM (active) performed  Intervention: Prevent Infection  Flowsheets (Taken 7/27/2024 2215)  Infection Prevention:   rest/sleep promoted   single patient room provided  Goal: Optimal Comfort and Wellbeing  Outcome: Progressing  Intervention: Monitor Pain and Promote Comfort  Flowsheets (Taken 7/27/2024 2215)  Pain Management Interventions:   care clustered   quiet environment facilitated  Intervention: Provide Person-Centered Care  Flowsheets (Taken 7/27/2024 2215)  Trust Relationship/Rapport:   questions answered   questions encouraged  Goal: Readiness for Transition of Care  Outcome: Progressing     Problem: Wound  Goal: Optimal Coping  Outcome: Progressing  Goal: Optimal Functional Ability  Outcome: Progressing  Goal: Absence of Infection Signs and Symptoms  Outcome: Progressing  Goal: Improved Oral Intake  Outcome: Progressing  Goal: Optimal Pain Control and Function  Outcome: Progressing  Goal: Skin Health and Integrity  Outcome: Progressing  Goal: Optimal Wound Healing  Outcome: Progressing      Problem: Fall Injury Risk  Goal: Absence of Fall and Fall-Related Injury  Outcome: Progressing  Intervention: Promote Injury-Free Environment  Flowsheets (Taken 7/27/2024 2215)  Safety Promotion/Fall Prevention:   assistive device/personal item within reach   side rails raised x 2     Problem: Skin Injury Risk Increased  Goal: Skin Health and Integrity  Outcome: Progressing  Intervention: Optimize Skin Protection  Flowsheets (Taken 7/27/2024 2215)  Skin Protection: protective footwear used  Activity Management: Up in chair - L3  Head of Bed (HOB) Positioning: HOB at 20-30 degrees

## 2024-07-28 NOTE — PLAN OF CARE
Problem: Adult Inpatient Plan of Care  Goal: Plan of Care Review  Outcome: Progressing  Flowsheets (Taken 7/28/2024 1112)  Plan of Care Reviewed With:   patient   spouse  Goal: Patient-Specific Goal (Individualized)  Outcome: Progressing  Goal: Absence of Hospital-Acquired Illness or Injury  Outcome: Progressing  Intervention: Identify and Manage Fall Risk  Flowsheets (Taken 7/28/2024 1112)  Safety Promotion/Fall Prevention:   assistive device/personal item within reach   medications reviewed   nonskid shoes/socks when out of bed   side rails raised x 2  Intervention: Prevent Skin Injury  Flowsheets (Taken 7/28/2024 1112)  Body Position: position changed independently  Skin Protection: incontinence pads utilized  Device Skin Pressure Protection:   absorbent pad utilized/changed   tubing/devices free from skin contact  Intervention: Prevent and Manage VTE (Venous Thromboembolism) Risk  Flowsheets (Taken 7/28/2024 1112)  VTE Prevention/Management:   ambulation promoted   bleeding risk assessed   ROM (active) performed  Intervention: Prevent Infection  Flowsheets (Taken 7/28/2024 1112)  Infection Prevention:   rest/sleep promoted   single patient room provided  Goal: Optimal Comfort and Wellbeing  Outcome: Progressing  Intervention: Monitor Pain and Promote Comfort  Flowsheets (Taken 7/28/2024 1112)  Pain Management Interventions:   care clustered   medication offered   pain management plan reviewed with patient/caregiver   pillow support provided   position adjusted  Intervention: Provide Person-Centered Care  Flowsheets (Taken 7/28/2024 1112)  Trust Relationship/Rapport: care explained  Goal: Readiness for Transition of Care  Outcome: Progressing     Problem: Wound  Goal: Optimal Coping  Outcome: Progressing  Intervention: Support Patient and Family Response  Flowsheets (Taken 7/28/2024 1112)  Supportive Measures: active listening utilized  Goal: Optimal Functional Ability  Outcome: Progressing  Intervention:  Optimize Functional Ability  Flowsheets (Taken 7/28/2024 1112)  Activity Management: Rolling - L1  Activity Assistance Provided: independent  Goal: Absence of Infection Signs and Symptoms  Outcome: Progressing  Intervention: Prevent or Manage Infection  Flowsheets (Taken 7/28/2024 1112)  Infection Management: aseptic technique maintained  Isolation Precautions: precautions maintained  Goal: Improved Oral Intake  Outcome: Progressing  Intervention: Promote and Optimize Oral Intake  Flowsheets (Taken 7/28/2024 1112)  Oral Nutrition Promotion: rest periods promoted  Goal: Optimal Pain Control and Function  Outcome: Progressing  Intervention: Prevent or Manage Pain  Flowsheets (Taken 7/28/2024 1112)  Sleep/Rest Enhancement:   regular sleep/rest pattern promoted   relaxation techniques promoted  Pain Management Interventions:   care clustered   medication offered   pain management plan reviewed with patient/caregiver   pillow support provided   position adjusted  Goal: Skin Health and Integrity  Outcome: Progressing  Intervention: Optimize Skin Protection  Flowsheets (Taken 7/28/2024 1112)  Pressure Reduction Techniques:   frequent weight shift encouraged   weight shift assistance provided  Pressure Reduction Devices: positioning supports utilized  Skin Protection: incontinence pads utilized  Activity Management: Rolling - L1  Head of Bed (HOB) Positioning: HOB at 30-45 degrees  Goal: Optimal Wound Healing  Outcome: Progressing  Intervention: Promote Wound Healing  Flowsheets (Taken 7/28/2024 1112)  Sleep/Rest Enhancement:   regular sleep/rest pattern promoted   relaxation techniques promoted     Problem: Fall Injury Risk  Goal: Absence of Fall and Fall-Related Injury  Outcome: Progressing  Intervention: Identify and Manage Contributors  Flowsheets (Taken 7/28/2024 1112)  Self-Care Promotion: independence encouraged  Medication Review/Management: medications reviewed  Intervention: Promote Injury-Free  Environment  Flowsheets (Taken 7/28/2024 1112)  Safety Promotion/Fall Prevention:   assistive device/personal item within reach   medications reviewed   nonskid shoes/socks when out of bed   side rails raised x 2     Problem: Skin Injury Risk Increased  Goal: Skin Health and Integrity  Outcome: Progressing  Intervention: Optimize Skin Protection  Flowsheets (Taken 7/28/2024 1112)  Pressure Reduction Techniques:   frequent weight shift encouraged   weight shift assistance provided  Pressure Reduction Devices: positioning supports utilized  Skin Protection: incontinence pads utilized  Activity Management: Rolling - L1  Head of Bed (HOB) Positioning: HOB at 30-45 degrees  Intervention: Promote and Optimize Oral Intake  Flowsheets (Taken 7/28/2024 1112)  Oral Nutrition Promotion: rest periods promoted

## 2024-07-28 NOTE — NURSING
Nurses Note -- 4 Eyes      7/27/2024   7:15 pm      Skin assessed during: Daily Assessment      [x] No Altered Skin Integrity Present    []Prevention Measures Documented      [] Yes- Altered Skin Integrity Present or Discovered   [] LDA Added if Not in Epic (Describe Wound)   [] New Altered Skin Integrity was Present on Admit and Documented in LDA   [] Wound Image Taken    Wound Care Consulted? No    Attending Nurse:  Mary Nayak RN     Second RN/Staff Member:   Paolo Nguyen RN

## 2024-07-28 NOTE — NURSING
Nurses Note -- 4 Eyes      7/28/2024   8:00 AM      Skin assessed during: Q Shift Change      [x] No New Altered Skin Integrity Present    [x]Prevention Measures Documented      [] Yes- Altered Skin Integrity Present or Discovered   [] LDA Added if Not in Epic (Describe Wound)   [] New Altered Skin Integrity was Present on Admit and Documented in LDA   [] Wound Image Taken    Wound Care Consulted? No    Attending Nurse:  Paolo Nguyen RN     Second RN/Staff Member:  Mary Bardales RN

## 2024-07-29 LAB
ALBUMIN SERPL-MCNC: 2.8 G/DL (ref 3.4–4.8)
ALBUMIN/GLOB SERPL: 1.2 RATIO (ref 1.1–2)
ALP SERPL-CCNC: 65 UNIT/L (ref 40–150)
ALT SERPL-CCNC: 22 UNIT/L (ref 0–55)
ANION GAP SERPL CALC-SCNC: 11 MEQ/L
AST SERPL-CCNC: 24 UNIT/L (ref 5–34)
BILIRUB SERPL-MCNC: 0.8 MG/DL
BUN SERPL-MCNC: 22.4 MG/DL (ref 9.8–20.1)
CALCIUM SERPL-MCNC: 8.9 MG/DL (ref 8.4–10.2)
CHLORIDE SERPL-SCNC: 99 MMOL/L (ref 98–107)
CO2 SERPL-SCNC: 31 MMOL/L (ref 23–31)
CREAT SERPL-MCNC: 0.7 MG/DL (ref 0.55–1.02)
CREAT/UREA NIT SERPL: 32
GFR SERPLBLD CREATININE-BSD FMLA CKD-EPI: >60 ML/MIN/1.73/M2
GLOBULIN SER-MCNC: 2.4 GM/DL (ref 2.4–3.5)
GLUCOSE SERPL-MCNC: 166 MG/DL (ref 82–115)
GLUCOSE SERPL-MCNC: 177 MG/DL (ref 70–110)
GLUCOSE SERPL-MCNC: 229 MG/DL (ref 70–110)
POCT GLUCOSE: 229 MG/DL (ref 70–110)
POTASSIUM SERPL-SCNC: 3.6 MMOL/L (ref 3.5–5.1)
PROT SERPL-MCNC: 5.2 GM/DL (ref 5.8–7.6)
SODIUM SERPL-SCNC: 141 MMOL/L (ref 136–145)

## 2024-07-29 PROCEDURE — 25000003 PHARM REV CODE 250: Performed by: SURGERY

## 2024-07-29 PROCEDURE — 94660 CPAP INITIATION&MGMT: CPT

## 2024-07-29 PROCEDURE — 99900035 HC TECH TIME PER 15 MIN (STAT)

## 2024-07-29 PROCEDURE — 94640 AIRWAY INHALATION TREATMENT: CPT

## 2024-07-29 PROCEDURE — 25000242 PHARM REV CODE 250 ALT 637 W/ HCPCS

## 2024-07-29 PROCEDURE — 25000003 PHARM REV CODE 250

## 2024-07-29 PROCEDURE — 27000190 HC CPAP FULL FACE MASK W/VALVE

## 2024-07-29 PROCEDURE — 99900031 HC PATIENT EDUCATION (STAT)

## 2024-07-29 PROCEDURE — 63600175 PHARM REV CODE 636 W HCPCS: Performed by: SURGERY

## 2024-07-29 PROCEDURE — 94760 N-INVAS EAR/PLS OXIMETRY 1: CPT

## 2024-07-29 PROCEDURE — 63600175 PHARM REV CODE 636 W HCPCS

## 2024-07-29 PROCEDURE — 36415 COLL VENOUS BLD VENIPUNCTURE: CPT

## 2024-07-29 PROCEDURE — 94761 N-INVAS EAR/PLS OXIMETRY MLT: CPT

## 2024-07-29 PROCEDURE — 27000221 HC OXYGEN, UP TO 24 HOURS

## 2024-07-29 PROCEDURE — 11000001 HC ACUTE MED/SURG PRIVATE ROOM

## 2024-07-29 PROCEDURE — 5A09357 ASSISTANCE WITH RESPIRATORY VENTILATION, LESS THAN 24 CONSECUTIVE HOURS, CONTINUOUS POSITIVE AIRWAY PRESSURE: ICD-10-PCS | Performed by: SURGERY

## 2024-07-29 PROCEDURE — 27100171 HC OXYGEN HIGH FLOW UP TO 24 HOURS

## 2024-07-29 PROCEDURE — 80053 COMPREHEN METABOLIC PANEL: CPT

## 2024-07-29 RX ORDER — METOPROLOL SUCCINATE 50 MG/1
50 TABLET, EXTENDED RELEASE ORAL DAILY
Status: CANCELLED | OUTPATIENT
Start: 2024-07-29

## 2024-07-29 RX ORDER — IBUPROFEN 200 MG
16 TABLET ORAL
Status: DISCONTINUED | OUTPATIENT
Start: 2024-07-29 | End: 2024-07-31 | Stop reason: HOSPADM

## 2024-07-29 RX ORDER — IBUPROFEN 200 MG
24 TABLET ORAL
Status: CANCELLED | OUTPATIENT
Start: 2024-07-29

## 2024-07-29 RX ORDER — INSULIN GLARGINE 100 [IU]/ML
5 INJECTION, SOLUTION SUBCUTANEOUS DAILY
Status: CANCELLED | OUTPATIENT
Start: 2024-07-29

## 2024-07-29 RX ORDER — INSULIN ASPART 100 [IU]/ML
0-10 INJECTION, SOLUTION INTRAVENOUS; SUBCUTANEOUS
Status: DISCONTINUED | OUTPATIENT
Start: 2024-07-29 | End: 2024-07-31 | Stop reason: HOSPADM

## 2024-07-29 RX ORDER — IPRATROPIUM BROMIDE AND ALBUTEROL SULFATE 2.5; .5 MG/3ML; MG/3ML
3 SOLUTION RESPIRATORY (INHALATION) EVERY 8 HOURS
Status: DISCONTINUED | OUTPATIENT
Start: 2024-07-29 | End: 2024-07-31 | Stop reason: HOSPADM

## 2024-07-29 RX ORDER — LOSARTAN POTASSIUM 50 MG/1
50 TABLET ORAL DAILY
Status: DISCONTINUED | OUTPATIENT
Start: 2024-07-29 | End: 2024-07-31 | Stop reason: HOSPADM

## 2024-07-29 RX ORDER — IBUPROFEN 200 MG
16 TABLET ORAL
Status: CANCELLED | OUTPATIENT
Start: 2024-07-29

## 2024-07-29 RX ORDER — INSULIN ASPART 100 [IU]/ML
0-10 INJECTION, SOLUTION INTRAVENOUS; SUBCUTANEOUS
Status: CANCELLED | OUTPATIENT
Start: 2024-07-29

## 2024-07-29 RX ORDER — IBUPROFEN 200 MG
24 TABLET ORAL
Status: DISCONTINUED | OUTPATIENT
Start: 2024-07-29 | End: 2024-07-31 | Stop reason: HOSPADM

## 2024-07-29 RX ORDER — GLUCAGON 1 MG
1 KIT INJECTION
Status: CANCELLED | OUTPATIENT
Start: 2024-07-29

## 2024-07-29 RX ADMIN — SIMETHICONE 80 MG: 80 TABLET, CHEWABLE ORAL at 08:07

## 2024-07-29 RX ADMIN — LOSARTAN POTASSIUM 50 MG: 50 TABLET, FILM COATED ORAL at 10:07

## 2024-07-29 RX ADMIN — SODIUM CHLORIDE, POTASSIUM CHLORIDE, SODIUM LACTATE AND CALCIUM CHLORIDE: 600; 310; 30; 20 INJECTION, SOLUTION INTRAVENOUS at 01:07

## 2024-07-29 RX ADMIN — IPRATROPIUM BROMIDE AND ALBUTEROL SULFATE 3 ML: .5; 3 SOLUTION RESPIRATORY (INHALATION) at 04:07

## 2024-07-29 RX ADMIN — LABETALOL HYDROCHLORIDE 10 MG: 5 INJECTION, SOLUTION INTRAVENOUS at 10:07

## 2024-07-29 RX ADMIN — ENOXAPARIN SODIUM 30 MG: 30 INJECTION SUBCUTANEOUS at 09:07

## 2024-07-29 RX ADMIN — INSULIN ASPART 2 UNITS: 100 INJECTION, SOLUTION INTRAVENOUS; SUBCUTANEOUS at 05:07

## 2024-07-29 RX ADMIN — INSULIN ASPART 4 UNITS: 100 INJECTION, SOLUTION INTRAVENOUS; SUBCUTANEOUS at 12:07

## 2024-07-29 RX ADMIN — PANTOPRAZOLE SODIUM 40 MG: 40 INJECTION, POWDER, FOR SOLUTION INTRAVENOUS at 09:07

## 2024-07-29 RX ADMIN — LORAZEPAM 1 MG: 1 TABLET ORAL at 07:07

## 2024-07-29 RX ADMIN — SIMETHICONE 80 MG: 80 TABLET, CHEWABLE ORAL at 09:07

## 2024-07-29 RX ADMIN — INSULIN GLARGINE 5 UNITS: 100 INJECTION, SOLUTION SUBCUTANEOUS at 09:07

## 2024-07-29 RX ADMIN — VENLAFAXINE HYDROCHLORIDE 225 MG: 150 CAPSULE, EXTENDED RELEASE ORAL at 06:07

## 2024-07-29 RX ADMIN — LABETALOL HYDROCHLORIDE 10 MG: 5 INJECTION, SOLUTION INTRAVENOUS at 04:07

## 2024-07-29 RX ADMIN — INSULIN ASPART 2 UNITS: 100 INJECTION, SOLUTION INTRAVENOUS; SUBCUTANEOUS at 02:07

## 2024-07-29 RX ADMIN — LABETALOL HYDROCHLORIDE 10 MG: 5 INJECTION, SOLUTION INTRAVENOUS at 09:07

## 2024-07-29 RX ADMIN — LORAZEPAM 1 MG: 1 TABLET ORAL at 04:07

## 2024-07-29 RX ADMIN — SIMETHICONE 80 MG: 80 TABLET, CHEWABLE ORAL at 02:07

## 2024-07-29 RX ADMIN — INSULIN ASPART 3 UNITS: 100 INJECTION, SOLUTION INTRAVENOUS; SUBCUTANEOUS at 10:07

## 2024-07-29 NOTE — PROGRESS NOTES
Called by RN regarding patient concern of indigestion.    Chart reviewed     One dose Maalox 30 cc

## 2024-07-29 NOTE — PLAN OF CARE
Problem: Adult Inpatient Plan of Care  Goal: Plan of Care Review  Outcome: Progressing  Flowsheets (Taken 7/28/2024 1932)  Plan of Care Reviewed With:   patient   spouse  Goal: Patient-Specific Goal (Individualized)  Outcome: Progressing  Goal: Absence of Hospital-Acquired Illness or Injury  Outcome: Progressing  Intervention: Identify and Manage Fall Risk  Flowsheets (Taken 7/28/2024 1932)  Safety Promotion/Fall Prevention:   assistive device/personal item within reach   side rails raised x 2   nonskid shoes/socks when out of bed  Intervention: Prevent Skin Injury  Flowsheets (Taken 7/28/2024 1932)  Body Position: position changed independently  Skin Protection: protective footwear used  Device Skin Pressure Protection: tubing/devices free from skin contact  Intervention: Prevent Infection  Flowsheets (Taken 7/28/2024 1932)  Infection Prevention:   rest/sleep promoted   single patient room provided  Goal: Optimal Comfort and Wellbeing  Outcome: Progressing  Intervention: Monitor Pain and Promote Comfort  Flowsheets (Taken 7/28/2024 1932)  Pain Management Interventions:   care clustered   quiet environment facilitated  Intervention: Provide Person-Centered Care  Flowsheets (Taken 7/28/2024 1932)  Trust Relationship/Rapport:   questions encouraged   questions answered  Goal: Readiness for Transition of Care  Outcome: Progressing     Problem: Wound  Goal: Optimal Coping  Outcome: Progressing  Goal: Optimal Functional Ability  Outcome: Progressing  Goal: Absence of Infection Signs and Symptoms  Outcome: Progressing  Goal: Improved Oral Intake  Outcome: Progressing  Goal: Optimal Pain Control and Function  Outcome: Progressing  Goal: Skin Health and Integrity  Outcome: Progressing  Goal: Optimal Wound Healing  Outcome: Progressing     Problem: Fall Injury Risk  Goal: Absence of Fall and Fall-Related Injury  Outcome: Progressing  Intervention: Promote Injury-Free Environment  Flowsheets (Taken 7/28/2024 1932)  Safety  Promotion/Fall Prevention:   assistive device/personal item within reach   side rails raised x 2   nonskid shoes/socks when out of bed     Problem: Skin Injury Risk Increased  Goal: Skin Health and Integrity  Outcome: Progressing

## 2024-07-29 NOTE — PROGRESS NOTES
[facility] - Oncology Acute  General Surgery  Progress Note    Subjective:       POD# 6 s/p open ventral hernia repair    AF, VS reviewed. Hypertensive. NAEON    Progressing well. Restlessness and tremors now resolved with ativan. Sitting up on the side of the bed comfortably.     Still requiring oxygen, on 4L this morning. Audible wheezing. Complaining of SOB, not in acute distress. Uses nebulizer and CPAP at home. Reports having symptoms of bronchitis prior to surgery and visiting an urgent care.     Nausea improved. Emesis resolved. NGT discontinued after clamp trial yesterday morning. Tolerating sips of bariatric clear liquids. Reporting some indigestion.      Reports no bowel movement last night but still having flatus.    Pain managed well with PRN injectables and PO tylenol.      Post-Op Info:  Procedure(s) (LRB):  REPAIR, HERNIA, VENTRAL (N/A)   6 Days Post-Op      Medications:  Continuous Infusions:   lactated ringers   Intravenous Continuous 125 mL/hr at 07/29/24 0129 New Bag at 07/29/24 0129     Scheduled Meds:   acetaminophen  1,000 mg Oral Q8H    albuterol-ipratropium  3 mL Nebulization Q8H    enoxaparin  30 mg Subcutaneous Daily    insulin glargine U-100  5 Units Subcutaneous Daily    losartan  50 mg Oral Daily    pantoprazole  40 mg Intravenous Daily    simethicone  1 tablet Oral TID    venlafaxine  225 mg Oral QAM     PRN Meds:  Current Facility-Administered Medications:     ceFAZolin (Ancef) IV (PEDS and ADULTS), 2 g, Intravenous, On Call Procedure    cloNIDine 0.1 mg/24 hr td ptwk, 1 patch, Transdermal, Once PRN    dextrose 10%, 12.5 g, Intravenous, PRN    dextrose 10%, 25 g, Intravenous, PRN    glucagon (human recombinant), 1 mg, Intramuscular, PRN    glucose, 16 g, Oral, PRN    glucose, 24 g, Oral, PRN    insulin aspart U-100, 0-10 Units, Subcutaneous, QID (AC + HS) PRN    ketorolac, 30 mg, Intravenous, Q6H PRN    labetalol, 10 mg, Intravenous, Q2H PRN    LORazepam, 1 mg,  Sublingual, Q8H PRN    naloxone, 0.02 mg, Intravenous, PRN    ondansetron, 4 mg, Oral, Q4H PRN    ondansetron, 4 mg, Intravenous, Q4H PRN    prochlorperazine, 5 mg, Intravenous, Q4H PRN     Objective:     Vital Signs (Most Recent):  Temp: 98.2 °F (36.8 °C) (07/29/24 0755)  Pulse: 84 (07/29/24 0755)  Resp: 19 (07/29/24 0755)  BP: (!) 172/77 (07/29/24 0755)  SpO2: 100 % (07/29/24 0755) Vital Signs (24h Range):  Temp:  [97.8 °F (36.6 °C)-98.3 °F (36.8 °C)] 98.2 °F (36.8 °C)  Pulse:  [76-94] 84  Resp:  [16-20] 19  SpO2:  [88 %-100 %] 100 %  BP: (135-178)/(77-92) 172/77     No intake or output data in the 24 hours ending 07/29/24 0920      Physical Exam  Vitals reviewed.   Constitutional:       General: She is not in acute distress.     Appearance: Normal appearance. She is not ill-appearing.   Cardiovascular:      Rate and Rhythm: Normal rate and regular rhythm.   Pulmonary:      Effort: Pulmonary effort is normal. No respiratory distress.      Breath sounds: Wheezing present.   Abdominal:      General: Abdomen is flat. Bowel sounds are normal. There is no distension.      Palpations: Abdomen is soft.      Tenderness: There is abdominal tenderness. There is no guarding or rebound.      Hernia: No hernia is present.      Comments: Incision clean, draining serosanguinous fluid. Staple line intact.    Musculoskeletal:      Right lower leg: No edema.      Left lower leg: No edema.   Skin:     General: Skin is warm and dry.   Neurological:      Mental Status: She is alert.   Psychiatric:         Attention and Perception: Attention normal.         Mood and Affect: Mood is anxious.         Behavior: Behavior is not agitated.         Thought Content: Thought content normal.         Significant Labs:  CBC:   Recent Labs   Lab 07/28/24  1432   HGB 10.7*   HCT 32.3*       CMP:   Recent Labs   Lab 07/29/24  0326   CALCIUM 8.9   ALBUMIN 2.8*      K 3.6   CO2 31   CL 99   BUN 22.4*   CREATININE 0.70   ALKPHOS 65   ALT 22   AST  24   BILITOT 0.8       Significant Diagnostics:  None    Assessment/Plan:     There are no hospital problems to display for this patient.    POD#6  Progressing well. Oxygen requirement and SOB/wheezing likely related to bronchitis prior to surgery and hx COPD.    -post operative ileus  - XR KUB with non specific gas pattern  - scheduled simethicone  - advised to use chewing gum to promote bowel activity   - Mg, Phos normal   - NGT discontinued   - tolerating liquids   - advanced diet to soft/low residue   -SOB, wheezing   -improves with nebulizer treatment   -nebulizer treatment q8h   -CPAP QHS at 8mmHg  - IV meperidine and IV Toradol ordered PRN for pain   - benzodiazapine withdrawals, CIWA 8   - resolved, now on home dose ativan  - ativan 1mg q8h sublingual  - can take home dose venlafaxine  - withdrawal symptoms improving  - Hyperglycemia   - moderate dose insulin sliding scale and glucose checks   - 5 units lantus qd   -Hypertension   -losartan 50mg qd  - ambulate  - incentive spirometry  - IV fluids      João Gaffney MD  General Surgery, PGY-1  [facility]

## 2024-07-29 NOTE — PLAN OF CARE
Problem: Adult Inpatient Plan of Care  Goal: Plan of Care Review  Outcome: Progressing  Flowsheets (Taken 7/29/2024 1829)  Plan of Care Reviewed With:   patient   spouse  Goal: Patient-Specific Goal (Individualized)  Outcome: Progressing  Goal: Absence of Hospital-Acquired Illness or Injury  Outcome: Progressing  Intervention: Identify and Manage Fall Risk  Flowsheets (Taken 7/29/2024 1829)  Safety Promotion/Fall Prevention:   assistive device/personal item within reach   side rails raised x 2   nonskid shoes/socks when out of bed  Intervention: Prevent Skin Injury  Flowsheets (Taken 7/29/2024 1829)  Body Position: position changed independently  Skin Protection: protective footwear used  Device Skin Pressure Protection: tubing/devices free from skin contact  Intervention: Prevent and Manage VTE (Venous Thromboembolism) Risk  Flowsheets (Taken 7/29/2024 1829)  VTE Prevention/Management:   ambulation promoted   ROM (active) performed  Intervention: Prevent Infection  Flowsheets (Taken 7/29/2024 1829)  Infection Prevention:   rest/sleep promoted   single patient room provided  Goal: Optimal Comfort and Wellbeing  Outcome: Progressing  Intervention: Monitor Pain and Promote Comfort  Flowsheets (Taken 7/29/2024 1829)  Pain Management Interventions:   care clustered   quiet environment facilitated  Intervention: Provide Person-Centered Care  Flowsheets (Taken 7/29/2024 1829)  Trust Relationship/Rapport:   questions encouraged   questions answered  Goal: Readiness for Transition of Care  Outcome: Progressing     Problem: Wound  Goal: Optimal Coping  Outcome: Progressing  Goal: Optimal Functional Ability  Outcome: Progressing  Goal: Absence of Infection Signs and Symptoms  Outcome: Progressing  Goal: Improved Oral Intake  Outcome: Progressing  Goal: Optimal Pain Control and Function  Outcome: Progressing  Goal: Skin Health and Integrity  Outcome: Progressing  Goal: Optimal Wound Healing  Outcome: Progressing     Problem:  Fall Injury Risk  Goal: Absence of Fall and Fall-Related Injury  Outcome: Progressing  Intervention: Promote Injury-Free Environment  Flowsheets (Taken 7/29/2024 9065)  Safety Promotion/Fall Prevention:   assistive device/personal item within reach   side rails raised x 2   nonskid shoes/socks when out of bed     Problem: Skin Injury Risk Increased  Goal: Skin Health and Integrity  Outcome: Progressing

## 2024-07-29 NOTE — NURSING
Nurses Note -- 4 Eyes      7/28/2024   7:33 PM      Skin assessed during: Daily Assessment      [x] No Altered Skin Integrity Present    [x]Prevention Measures Documented      [] Yes- Altered Skin Integrity Present or Discovered   [] LDA Added if Not in Epic (Describe Wound)   [] New Altered Skin Integrity was Present on Admit and Documented in LDA   [] Wound Image Taken    Wound Care Consulted? No    Attending Nurse:  Mary Nayak RN     Second RN/Staff Member:   Paolo Nguyen RN

## 2024-07-30 LAB
ANION GAP SERPL CALC-SCNC: 8 MEQ/L
BUN SERPL-MCNC: 16.1 MG/DL (ref 9.8–20.1)
CALCIUM SERPL-MCNC: 8.5 MG/DL (ref 8.4–10.2)
CHLORIDE SERPL-SCNC: 100 MMOL/L (ref 98–107)
CO2 SERPL-SCNC: 33 MMOL/L (ref 23–31)
CREAT SERPL-MCNC: 0.75 MG/DL (ref 0.55–1.02)
CREAT/UREA NIT SERPL: 21
GFR SERPLBLD CREATININE-BSD FMLA CKD-EPI: >60 ML/MIN/1.73/M2
GLUCOSE SERPL-MCNC: 212 MG/DL (ref 82–115)
POTASSIUM SERPL-SCNC: 3.4 MMOL/L (ref 3.5–5.1)
SODIUM SERPL-SCNC: 141 MMOL/L (ref 136–145)

## 2024-07-30 PROCEDURE — 11000001 HC ACUTE MED/SURG PRIVATE ROOM

## 2024-07-30 PROCEDURE — 94760 N-INVAS EAR/PLS OXIMETRY 1: CPT

## 2024-07-30 PROCEDURE — 25000242 PHARM REV CODE 250 ALT 637 W/ HCPCS

## 2024-07-30 PROCEDURE — 99900035 HC TECH TIME PER 15 MIN (STAT)

## 2024-07-30 PROCEDURE — 25000003 PHARM REV CODE 250

## 2024-07-30 PROCEDURE — 63600175 PHARM REV CODE 636 W HCPCS: Performed by: SURGERY

## 2024-07-30 PROCEDURE — 27000221 HC OXYGEN, UP TO 24 HOURS

## 2024-07-30 PROCEDURE — 80048 BASIC METABOLIC PNL TOTAL CA: CPT

## 2024-07-30 PROCEDURE — 99900031 HC PATIENT EDUCATION (STAT)

## 2024-07-30 PROCEDURE — 63600175 PHARM REV CODE 636 W HCPCS

## 2024-07-30 PROCEDURE — 36415 COLL VENOUS BLD VENIPUNCTURE: CPT

## 2024-07-30 PROCEDURE — 25000003 PHARM REV CODE 250: Performed by: SURGERY

## 2024-07-30 PROCEDURE — 94640 AIRWAY INHALATION TREATMENT: CPT

## 2024-07-30 RX ORDER — FUROSEMIDE 10 MG/ML
10 INJECTION INTRAMUSCULAR; INTRAVENOUS ONCE
Status: DISCONTINUED | OUTPATIENT
Start: 2024-07-30 | End: 2024-07-30

## 2024-07-30 RX ORDER — POTASSIUM CHLORIDE 20 MEQ/1
40 TABLET, EXTENDED RELEASE ORAL ONCE
Status: COMPLETED | OUTPATIENT
Start: 2024-07-30 | End: 2024-07-30

## 2024-07-30 RX ORDER — FUROSEMIDE 10 MG/ML
20 INJECTION INTRAMUSCULAR; INTRAVENOUS ONCE
Status: CANCELLED | OUTPATIENT
Start: 2024-07-30

## 2024-07-30 RX ORDER — FUROSEMIDE 10 MG/ML
20 INJECTION INTRAMUSCULAR; INTRAVENOUS ONCE
Status: COMPLETED | OUTPATIENT
Start: 2024-07-30 | End: 2024-07-30

## 2024-07-30 RX ADMIN — POTASSIUM CHLORIDE 40 MEQ: 1500 TABLET, EXTENDED RELEASE ORAL at 09:07

## 2024-07-30 RX ADMIN — LABETALOL HYDROCHLORIDE 10 MG: 5 INJECTION, SOLUTION INTRAVENOUS at 12:07

## 2024-07-30 RX ADMIN — SIMETHICONE 80 MG: 80 TABLET, CHEWABLE ORAL at 08:07

## 2024-07-30 RX ADMIN — IPRATROPIUM BROMIDE AND ALBUTEROL SULFATE 3 ML: .5; 3 SOLUTION RESPIRATORY (INHALATION) at 12:07

## 2024-07-30 RX ADMIN — INSULIN ASPART 1 UNITS: 100 INJECTION, SOLUTION INTRAVENOUS; SUBCUTANEOUS at 09:07

## 2024-07-30 RX ADMIN — INSULIN ASPART 6 UNITS: 100 INJECTION, SOLUTION INTRAVENOUS; SUBCUTANEOUS at 04:07

## 2024-07-30 RX ADMIN — FUROSEMIDE 20 MG: 10 INJECTION, SOLUTION INTRAMUSCULAR; INTRAVENOUS at 09:07

## 2024-07-30 RX ADMIN — LABETALOL HYDROCHLORIDE 10 MG: 5 INJECTION, SOLUTION INTRAVENOUS at 06:07

## 2024-07-30 RX ADMIN — ENOXAPARIN SODIUM 30 MG: 30 INJECTION SUBCUTANEOUS at 08:07

## 2024-07-30 RX ADMIN — LORAZEPAM 1 MG: 1 TABLET ORAL at 08:07

## 2024-07-30 RX ADMIN — IPRATROPIUM BROMIDE AND ALBUTEROL SULFATE 3 ML: .5; 3 SOLUTION RESPIRATORY (INHALATION) at 03:07

## 2024-07-30 RX ADMIN — IPRATROPIUM BROMIDE AND ALBUTEROL SULFATE 3 ML: .5; 3 SOLUTION RESPIRATORY (INHALATION) at 08:07

## 2024-07-30 RX ADMIN — LORAZEPAM 1 MG: 1 TABLET ORAL at 04:07

## 2024-07-30 RX ADMIN — INSULIN ASPART 6 UNITS: 100 INJECTION, SOLUTION INTRAVENOUS; SUBCUTANEOUS at 10:07

## 2024-07-30 RX ADMIN — INSULIN GLARGINE 5 UNITS: 100 INJECTION, SOLUTION SUBCUTANEOUS at 08:07

## 2024-07-30 RX ADMIN — VENLAFAXINE HYDROCHLORIDE 225 MG: 150 CAPSULE, EXTENDED RELEASE ORAL at 06:07

## 2024-07-30 RX ADMIN — LABETALOL HYDROCHLORIDE 10 MG: 5 INJECTION, SOLUTION INTRAVENOUS at 04:07

## 2024-07-30 RX ADMIN — LOSARTAN POTASSIUM 50 MG: 50 TABLET, FILM COATED ORAL at 08:07

## 2024-07-30 RX ADMIN — INSULIN ASPART 4 UNITS: 100 INJECTION, SOLUTION INTRAVENOUS; SUBCUTANEOUS at 06:07

## 2024-07-30 RX ADMIN — LORAZEPAM 1 MG: 1 TABLET ORAL at 12:07

## 2024-07-30 RX ADMIN — PANTOPRAZOLE SODIUM 40 MG: 40 INJECTION, POWDER, FOR SOLUTION INTRAVENOUS at 08:07

## 2024-07-30 RX ADMIN — ACETAMINOPHEN 1000 MG: 500 TABLET ORAL at 02:07

## 2024-07-30 NOTE — PLAN OF CARE
Problem: Adult Inpatient Plan of Care  Goal: Plan of Care Review  Outcome: Progressing  Flowsheets (Taken 7/29/2024 1933)  Plan of Care Reviewed With:   patient   spouse   family  Goal: Patient-Specific Goal (Individualized)  Outcome: Progressing  Goal: Absence of Hospital-Acquired Illness or Injury  Outcome: Progressing  Intervention: Identify and Manage Fall Risk  Flowsheets (Taken 7/29/2024 1933)  Safety Promotion/Fall Prevention:   assistive device/personal item within reach   medications reviewed   nonskid shoes/socks when out of bed   side rails raised x 2  Intervention: Prevent Skin Injury  Flowsheets (Taken 7/29/2024 1933)  Body Position: position changed independently  Skin Protection: incontinence pads utilized  Device Skin Pressure Protection:   absorbent pad utilized/changed   tubing/devices free from skin contact  Intervention: Prevent and Manage VTE (Venous Thromboembolism) Risk  Flowsheets (Taken 7/29/2024 1933)  VTE Prevention/Management:   ambulation promoted   bleeding risk assessed   ROM (active) performed  Intervention: Prevent Infection  Flowsheets (Taken 7/29/2024 1933)  Infection Prevention:   rest/sleep promoted   single patient room provided  Goal: Optimal Comfort and Wellbeing  Outcome: Progressing  Intervention: Monitor Pain and Promote Comfort  Flowsheets (Taken 7/29/2024 1933)  Pain Management Interventions:   care clustered   medication offered   pain management plan reviewed with patient/caregiver   pillow support provided   position adjusted  Intervention: Provide Person-Centered Care  Flowsheets (Taken 7/29/2024 1933)  Trust Relationship/Rapport: care explained  Goal: Readiness for Transition of Care  Outcome: Progressing     Problem: Wound  Goal: Optimal Coping  Outcome: Progressing  Intervention: Support Patient and Family Response  Flowsheets (Taken 7/29/2024 1933)  Supportive Measures:   active listening utilized   relaxation techniques promoted  Goal: Optimal Functional  Ability  Outcome: Progressing  Intervention: Optimize Functional Ability  Flowsheets (Taken 7/29/2024 1933)  Activity Management:   Ambulated in koenig - L4   Ambulated in room - L4  Activity Assistance Provided: assistance, 1 person  Goal: Absence of Infection Signs and Symptoms  Outcome: Progressing  Intervention: Prevent or Manage Infection  Flowsheets (Taken 7/29/2024 1933)  Infection Management: aseptic technique maintained  Goal: Improved Oral Intake  Outcome: Progressing  Intervention: Promote and Optimize Oral Intake  Flowsheets (Taken 7/29/2024 1933)  Oral Nutrition Promotion: rest periods promoted  Goal: Optimal Pain Control and Function  Outcome: Progressing  Intervention: Prevent or Manage Pain  Flowsheets (Taken 7/29/2024 1933)  Sleep/Rest Enhancement:   regular sleep/rest pattern promoted   relaxation techniques promoted  Pain Management Interventions:   care clustered   medication offered   pain management plan reviewed with patient/caregiver   pillow support provided   position adjusted  Goal: Skin Health and Integrity  Outcome: Progressing  Intervention: Optimize Skin Protection  Flowsheets (Taken 7/29/2024 1933)  Pressure Reduction Techniques:   frequent weight shift encouraged   weight shift assistance provided  Pressure Reduction Devices: positioning supports utilized  Skin Protection: incontinence pads utilized  Activity Management:   Ambulated in koenig - L4   Ambulated in room - L4  Head of Bed (HOB) Positioning: HOB at 20-30 degrees  Goal: Optimal Wound Healing  Outcome: Progressing  Intervention: Promote Wound Healing  Flowsheets (Taken 7/29/2024 1933)  Sleep/Rest Enhancement:   regular sleep/rest pattern promoted   relaxation techniques promoted     Problem: Fall Injury Risk  Goal: Absence of Fall and Fall-Related Injury  Outcome: Progressing  Intervention: Identify and Manage Contributors  Flowsheets (Taken 7/29/2024 1933)  Self-Care Promotion: independence encouraged  Medication  Review/Management: medications reviewed  Intervention: Promote Injury-Free Environment  Flowsheets (Taken 7/29/2024 1933)  Safety Promotion/Fall Prevention:   assistive device/personal item within reach   medications reviewed   nonskid shoes/socks when out of bed   side rails raised x 2     Problem: Skin Injury Risk Increased  Goal: Skin Health and Integrity  Outcome: Progressing  Intervention: Optimize Skin Protection  Flowsheets (Taken 7/29/2024 1933)  Pressure Reduction Techniques:   frequent weight shift encouraged   weight shift assistance provided  Pressure Reduction Devices: positioning supports utilized  Skin Protection: incontinence pads utilized  Activity Management:   Ambulated in koenig - L4   Ambulated in room - L4  Head of Bed (HOB) Positioning: HOB at 20-30 degrees  Intervention: Promote and Optimize Oral Intake  Flowsheets (Taken 7/29/2024 1933)  Oral Nutrition Promotion: rest periods promoted

## 2024-07-30 NOTE — PROGRESS NOTES
[facility] - Oncology Acute  General Surgery  Progress Note    Subjective:       POD# 7 s/p open ventral hernia repair    AF, VS reviewed. Hypertensive. NAEON    Progressing well.     SOB/wheezing improving with scheduled nebulizer and overnight CPAP. Still requiring oxygen, on 2L this morning. Reports having symptoms of bronchitis prior to surgery and visiting an urgent care. Her sats were dropping to 70's overnight when trying to ween oxygen.     Nil n/v. Tolerating soft/low residue diet.     Reports bowel movement this morning but still having flatus.    Pain managed well with PRN injectables and PO tylenol.      Post-Op Info:  Procedure(s) (LRB):  REPAIR, HERNIA, VENTRAL (N/A)   7 Days Post-Op      Medications:  Continuous Infusions:   lactated ringers   Intravenous Continuous 50 mL/hr at 07/29/24 0909 Rate Change at 07/29/24 0909     Scheduled Meds:   acetaminophen  1,000 mg Oral Q8H    albuterol-ipratropium  3 mL Nebulization Q8H    enoxaparin  30 mg Subcutaneous Daily    insulin glargine U-100  5 Units Subcutaneous Daily    losartan  50 mg Oral Daily    pantoprazole  40 mg Intravenous Daily    simethicone  1 tablet Oral TID    venlafaxine  225 mg Oral QAM     PRN Meds:  Current Facility-Administered Medications:     ceFAZolin (Ancef) IV (PEDS and ADULTS), 2 g, Intravenous, On Call Procedure    cloNIDine 0.1 mg/24 hr td ptwk, 1 patch, Transdermal, Once PRN    dextrose 10%, 12.5 g, Intravenous, PRN    dextrose 10%, 25 g, Intravenous, PRN    glucagon (human recombinant), 1 mg, Intramuscular, PRN    glucose, 16 g, Oral, PRN    glucose, 24 g, Oral, PRN    insulin aspart U-100, 0-10 Units, Subcutaneous, QID (AC + HS) PRN    ketorolac, 30 mg, Intravenous, Q6H PRN    labetalol, 10 mg, Intravenous, Q2H PRN    LORazepam, 1 mg, Sublingual, Q8H PRN    naloxone, 0.02 mg, Intravenous, PRN    ondansetron, 4 mg, Oral, Q4H PRN    ondansetron, 4 mg, Intravenous, Q4H PRN    prochlorperazine, 5 mg, Intravenous,  Q4H PRN     Objective:     Vital Signs (Most Recent):  Temp: 97.7 °F (36.5 °C) (07/30/24 0333)  Pulse: 80 (07/30/24 0333)  Resp: 16 (07/30/24 0043)  BP: (!) 155/83 (07/30/24 0333)  SpO2: 97 % (07/30/24 0333) Vital Signs (24h Range):  Temp:  [97.7 °F (36.5 °C)-98.4 °F (36.9 °C)] 97.7 °F (36.5 °C)  Pulse:  [76-94] 80  Resp:  [16-20] 16  SpO2:  [97 %-100 %] 97 %  BP: (138-172)/(72-94) 155/83       Intake/Output Summary (Last 24 hours) at 7/30/2024 0633  Last data filed at 7/30/2024 0509  Gross per 24 hour   Intake 1400 ml   Output 1800 ml   Net -400 ml         Physical Exam  Vitals reviewed.   Constitutional:       General: She is not in acute distress.     Appearance: Normal appearance. She is not ill-appearing.   Cardiovascular:      Rate and Rhythm: Normal rate and regular rhythm.   Pulmonary:      Effort: Pulmonary effort is normal. No respiratory distress.      Breath sounds: No wheezing.   Abdominal:      General: Abdomen is flat. Bowel sounds are normal. There is no distension.      Palpations: Abdomen is soft.      Tenderness: There is no abdominal tenderness. There is no guarding or rebound.      Hernia: No hernia is present.      Comments: Incision clean, draining serosanguinous fluid. Staple line intact.    Musculoskeletal:      Right lower leg: Edema present.      Left lower leg: Edema present.   Skin:     General: Skin is warm and dry.   Neurological:      Mental Status: She is alert.   Psychiatric:         Attention and Perception: Attention normal.         Mood and Affect: Mood is not anxious.         Behavior: Behavior is not agitated.         Thought Content: Thought content normal.         Significant Labs:  CBC:   Recent Labs   Lab 07/28/24  1432   HGB 10.7*   HCT 32.3*       CMP:   Recent Labs   Lab 07/29/24  0326   CALCIUM 8.9   ALBUMIN 2.8*      K 3.6   CO2 31   CL 99   BUN 22.4*   CREATININE 0.70   ALKPHOS 65   ALT 22   AST 24   BILITOT 0.8       Significant  Diagnostics:  None    Assessment/Plan:     There are no hospital problems to display for this patient.    POD#7  Progressing well.     -post operative ileus  - XR KUB with non specific gas pattern  - scheduled simethicone  - advised to use chewing gum to promote bowel activity   - Mg, Phos normal   - NGT discontinued   - tolerating soft/low residue   -SOB, wheezing   -improving   -nebulizer treatment q8h   -CPAP QHS at 8mmHg   -fluid up, bilateral lower extremity edema, dc fluids    -repeat CXR  - IV meperidine and IV Toradol ordered PRN for pain   - benzodiazapine withdrawals   - resolved, now on home dose ativan  - ativan 1mg q8h sublingual  - can take home dose venlafaxine  - withdrawal symptoms improving  - Hyperglycemia   - moderate dose insulin sliding scale and glucose checks   - 5 units lantus qd   -Hypertension   -losartan 50mg qd  - ambulate  - incentive spirometry  - IV fluids      João Gaffney MD  General Surgery, PGY-1  [facility]

## 2024-07-30 NOTE — NURSING
Nurses Note -- 4 Eyes      7/29/2024   8:00 AM      Skin assessed during: Q Shift Change      [x] No Altered Skin Integrity Present    [x]Prevention Measures Documented      [] Yes- Altered Skin Integrity Present or Discovered   [] LDA Added if Not in Epic (Describe Wound)   [] New Altered Skin Integrity was Present on Admit and Documented in LDA   [] Wound Image Taken    Wound Care Consulted? No    Attending Nurse:  Paolo Nguyen RN     Second RN/Staff Member:  Mary Bardales RN

## 2024-07-30 NOTE — NURSING
Nurses Note -- 4 Eyes      7/30/2024   7:05 AM      Skin assessed during: Q Shift Change      [x] No new Altered Skin Integrity Present    []Prevention Measures Documented      [] Yes- Altered Skin Integrity Present or Discovered   [] LDA Added if Not in Epic (Describe Wound)   [] New Altered Skin Integrity was Present on Admit and Documented in LDA   [] Wound Image Taken    Wound Care Consulted? No    Attending Nurse:  Alva Balbuena RN    Second RN/Staff Member:   LAURA Bardales RN

## 2024-07-30 NOTE — NURSING
Nurses Note -- 4 Eyes      7/29/2024   7:12 PM      Skin assessed during: Daily Assessment      [x] No Altered Skin Integrity Present    []Prevention Measures Documented      [] Yes- Altered Skin Integrity Present or Discovered   [] LDA Added if Not in Epic (Describe Wound)   [] New Altered Skin Integrity was Present on Admit and Documented in LDA   [] Wound Image Taken    Wound Care Consulted? No    Attending Nurse:  Mary Nayak RN     Second RN/Staff Member:   Paolo Nguyen RN

## 2024-07-30 NOTE — PROGRESS NOTES
Inpatient Nutrition Assessment    Admit Date: 7/23/2024   Total duration of encounter: 7 days   Patient Age: 64 y.o.    Nutrition Recommendation/Prescription     Diet diabetic 1800 Calorie ordered   Encouraged adequate PO intake  Will monitor appetite/PO intake, weight, and labs    Communication of Recommendations: reviewed with patient    Nutrition Assessment     Malnutrition Assessment/Nutrition-Focused Physical Exam    Malnutrition Context: other (see comments) (Does not meet criteria at this time) (07/26/24 1349)  Malnutrition Level: other (see comments) (Does not meet criteria at this time) (07/26/24 1349)  Energy Intake (Malnutrition): less than or equal to 50% for greater than or equal to 5 days (07/26/24 1349)  Weight Loss (Malnutrition): other (see comments) (Does not meet criteria) (07/26/24 1349)  Subcutaneous Fat (Malnutrition): other (see comments) (Does not meet criteria) (07/26/24 1349)           Muscle Mass (Malnutrition): other (see comments) (Does not meet criteria) (07/26/24 1349)                          Fluid Accumulation (Malnutrition): other (see comments) (Does not meet criteria) (07/26/24 1349)        A minimum of two characteristics is recommended for diagnosis of either severe or non-severe malnutrition.    Chart Review    Reason Seen: continuous nutrition monitoring and follow-up NPO/Clear Liquid diet >4 days on 7/28/2024    Malnutrition Screening Tool Results   Have you recently lost weight without trying?: No  Have you been eating poorly because of a decreased appetite?: No   MST Score: 0   Diagnosis:  - Open Incarcerated Ventral Hernia Repair      Relevant Medical History:    Abdominal pain      Arthritis      Asthma      Back pain      Breast cancer, stage 1       lumpectomy    Breast pain      Chronic back pain      Claustrophobia      Constipation      CPAP (continuous positive airway pressure) dependence      Depression      Diabetes mellitus type I      Disorder of muscle       Dyspnea      Encounter for blood transfusion      Fibromyalgia      GERD (gastroesophageal reflux disease)      Hx of urinary tract infection      Hypercholesterolemia      Hyperlipidemia      Hypertension      Hypotension      IBS (irritable bowel syndrome)      Incarcerated ventral hernia      Memory loss      Muscular disorder      Necrotizing fasciitis      Osteoporosis      Panic attack      Personal history of colonic polyps 12/21/2018    Sleep apnea, unspecified      SOB (shortness of breath) on exertion      Urinary frequency      Ventral hernia      Weakness        Scheduled Medications:  acetaminophen, 1,000 mg, Q8H  albuterol-ipratropium, 3 mL, Q8H  enoxaparin, 30 mg, Daily  insulin glargine U-100, 5 Units, Daily  losartan, 50 mg, Daily  pantoprazole, 40 mg, Daily  simethicone, 1 tablet, TID  venlafaxine, 225 mg, QAM    Continuous Infusions:     PRN Medications:  ceFAZolin (Ancef) IV (PEDS and ADULTS), 2 g, On Call Procedure  cloNIDine 0.1 mg/24 hr td ptwk, 1 patch, Once PRN  dextrose 10%, 12.5 g, PRN  dextrose 10%, 25 g, PRN  glucagon (human recombinant), 1 mg, PRN  glucose, 16 g, PRN  glucose, 24 g, PRN  insulin aspart U-100, 0-10 Units, QID (AC + HS) PRN  ketorolac, 30 mg, Q6H PRN  labetalol, 10 mg, Q2H PRN  LORazepam, 1 mg, Q8H PRN  naloxone, 0.02 mg, PRN  ondansetron, 4 mg, Q4H PRN  ondansetron, 4 mg, Q4H PRN  prochlorperazine, 5 mg, Q4H PRN    Calorie Containing IV Medications: no significant kcals from medications at this time    Recent Labs   Lab 07/24/24  0339 07/24/24  0514 07/25/24  0403 07/26/24  0402 07/27/24  0308 07/28/24  0407 07/28/24  1432 07/29/24  0326 07/30/24  0656     --  137 139 142 141  --  141 141   K 5.4*  --  4.9 4.7 4.1 3.7  --  3.6 3.4*   CALCIUM 8.7  --  8.8 9.2 9.2 9.5  --  8.9 8.5   PHOS  --   --   --   --   --  2.6  --   --   --    MG  --   --   --   --   --  1.90  --   --   --    CO2 25  --  23 22* 30 34*  --  31 33*   BUN 24.3*  --  26.2* 22.2* 27.0* 27.7*  --   "22.4* 16.1   CREATININE 2.34*  --  1.66* 0.99 0.98 0.83  --  0.70 0.75   EGFRNORACEVR 23  --  34 >60 >60 >60  --  >60 >60   GLUCOSE 151*  --  110 252* 221* 207*  --  166* 212*   BILITOT  --   --  0.4 0.7 0.5 0.6  --  0.8  --    ALKPHOS  --   --  78 74 72 66  --  65  --    ALT  --   --  5 11 17 19  --  22  --    AST  --   --  34 45* 44* 23  --  24  --    ALBUMIN  --   --  3.5 3.4 3.4 3.1*  --  2.8*  --    WBC  --  12.21*  --   --   --   --   --   --   --    HGB  --  12.7 12.5  --   --   --  10.7*  --   --    HCT  --  40.9 39.9  --   --   --  32.3*  --   --      Nutrition Orders:  Diet diabetic 1800 Calorie      Appetite/Oral Intake: good/% of meals  Factors Affecting Nutritional Intake: none identified  Social Needs Impacting Access to Food: none identified  Food/Gnosticist/Cultural Preferences: none reported  Food Allergies: none reported  Last Bowel Movement: 24  Wound(s):  none noted    Comments    2024: Pt's  reports a poor appetite/PO intake ~5 days. Pt NPO with NGT to suction. Pt denies N/V and chewing/swallowing difficulties. Last BM noted. Pt wears dentures, reports the top is tight-fitting. Pt denies unplanned wt loss. Per EMR, pt weighed 77.1 kg on 2023. Provided PPN recommendations if needed. Will monitor.    2024: Pt reports a good appetite/PO intake and denies N/V/D/C. Last BM noted. Encouraged adequate PO intake. Will monitor.    Anthropometrics    Height: 5' 7" (170.2 cm), Height Method: Stated  Last Weight: 83.7 kg (184 lb 8.4 oz) (24 1159), Weight Method: Standard Scale  BMI (Calculated): 28.9  BMI Classification: overweight (BMI 25-29.9)     Ideal Body Weight (IBW), Female: 135 lb     % Ideal Body Weight, Female (lb): 136.69 %                    Usual Body Weight (UBW), k.1 kg  % Usual Body Weight: 108.79     Usual Weight Provided By: EMR weight history    Wt Readings from Last 5 Encounters:   24 83.7 kg (184 lb 8.4 oz)   24 84.8 kg (187 lb) "   01/10/24 86.4 kg (190 lb 6.4 oz)   11/29/23 86.2 kg (190 lb)   08/22/23 77.1 kg (170 lb)     Weight Change(s) Since Admission:   7/23/2024: 83.7 kg  7/30/2024: no new wts  Wt Readings from Last 1 Encounters:   07/23/24 1159 83.7 kg (184 lb 8.4 oz)   07/17/24 1347 83.9 kg (185 lb)   Admit Weight: 83.9 kg (185 lb) (07/17/24 1347), Weight Method: Stated    Estimated Needs    Weight Used For Calorie Calculations: 83.7 kg (184 lb 8.4 oz)  Energy Calorie Requirements (kcal): 2092 (25 kcal/kg)  Energy Need Method: Kcal/kg  Weight Used For Protein Calculations: 83.7 kg (184 lb 8.4 oz)  Protein Requirements:  (1.0-1.2 g/kg)  Fluid Requirements (mL): 2092 (1 mL/kcal)  CHO Requirement: 235-288 g (45-55% est min kcal needs)     Enteral Nutrition     Patient not receiving enteral nutrition at this time.    Parenteral Nutrition     Patient not receiving parenteral nutrition support at this time.    Evaluation of Received Nutrient Intake    Calories: meeting estimated needs  Protein: meeting estimated needs    Patient Education     Not applicable.    Nutrition Diagnosis     PES: Inadequate oral intake related to acute illness as evidenced by poor PO intake for ~5 days. (active)     Nutrition Interventions     Intervention(s): general/healthful diet, modified composition of parenteral nutrition, modified rate of parenteral nutrition, and collaboration with other providers    Goal: Meet greater than 80% of nutritional needs by follow-up. (goal progressing)  Goal: Maintain weight throughout hospitalization. (goal progressing)    Nutrition Goals & Monitoring     Dietitian will monitor: energy intake, weight, electrolyte/renal panel, glucose/endocrine profile, and gastrointestinal profile  Discharge planning: continue Diabetic diet  Nutrition Risk/Follow-Up: moderate (follow-up in 3-5 days)   Please consult if re-assessment needed sooner.

## 2024-07-31 VITALS
HEIGHT: 67 IN | HEART RATE: 97 BPM | SYSTOLIC BLOOD PRESSURE: 166 MMHG | WEIGHT: 184.5 LBS | DIASTOLIC BLOOD PRESSURE: 80 MMHG | OXYGEN SATURATION: 91 % | TEMPERATURE: 99 F | BODY MASS INDEX: 28.96 KG/M2 | RESPIRATION RATE: 16 BRPM

## 2024-07-31 PROBLEM — K43.6 INCARCERATED VENTRAL HERNIA: Status: ACTIVE | Noted: 2024-07-31

## 2024-07-31 LAB
ANION GAP SERPL CALC-SCNC: 8 MEQ/L
BUN SERPL-MCNC: 14.2 MG/DL (ref 9.8–20.1)
CALCIUM SERPL-MCNC: 8.2 MG/DL (ref 8.4–10.2)
CHLORIDE SERPL-SCNC: 99 MMOL/L (ref 98–107)
CO2 SERPL-SCNC: 33 MMOL/L (ref 23–31)
CREAT SERPL-MCNC: 0.77 MG/DL (ref 0.55–1.02)
CREAT/UREA NIT SERPL: 18
GFR SERPLBLD CREATININE-BSD FMLA CKD-EPI: >60 ML/MIN/1.73/M2
GLUCOSE SERPL-MCNC: 225 MG/DL (ref 82–115)
MAGNESIUM SERPL-MCNC: 1.8 MG/DL (ref 1.6–2.6)
PHOSPHATE SERPL-MCNC: 4.2 MG/DL (ref 2.3–4.7)
POTASSIUM SERPL-SCNC: 3.4 MMOL/L (ref 3.5–5.1)
SODIUM SERPL-SCNC: 140 MMOL/L (ref 136–145)

## 2024-07-31 PROCEDURE — 99900035 HC TECH TIME PER 15 MIN (STAT)

## 2024-07-31 PROCEDURE — 25000242 PHARM REV CODE 250 ALT 637 W/ HCPCS

## 2024-07-31 PROCEDURE — 63600175 PHARM REV CODE 636 W HCPCS

## 2024-07-31 PROCEDURE — 94760 N-INVAS EAR/PLS OXIMETRY 1: CPT

## 2024-07-31 PROCEDURE — 63600175 PHARM REV CODE 636 W HCPCS: Performed by: SURGERY

## 2024-07-31 PROCEDURE — 99900031 HC PATIENT EDUCATION (STAT)

## 2024-07-31 PROCEDURE — 94799 UNLISTED PULMONARY SVC/PX: CPT

## 2024-07-31 PROCEDURE — 27000221 HC OXYGEN, UP TO 24 HOURS

## 2024-07-31 PROCEDURE — 25000003 PHARM REV CODE 250: Performed by: SURGERY

## 2024-07-31 PROCEDURE — 94640 AIRWAY INHALATION TREATMENT: CPT

## 2024-07-31 PROCEDURE — 80048 BASIC METABOLIC PNL TOTAL CA: CPT

## 2024-07-31 PROCEDURE — 36415 COLL VENOUS BLD VENIPUNCTURE: CPT

## 2024-07-31 PROCEDURE — 84100 ASSAY OF PHOSPHORUS: CPT

## 2024-07-31 PROCEDURE — 83735 ASSAY OF MAGNESIUM: CPT

## 2024-07-31 PROCEDURE — 25000003 PHARM REV CODE 250

## 2024-07-31 RX ORDER — FUROSEMIDE 10 MG/ML
20 INJECTION INTRAMUSCULAR; INTRAVENOUS ONCE
Status: COMPLETED | OUTPATIENT
Start: 2024-07-31 | End: 2024-07-31

## 2024-07-31 RX ORDER — POTASSIUM CHLORIDE 20 MEQ/1
40 TABLET, EXTENDED RELEASE ORAL ONCE
Status: COMPLETED | OUTPATIENT
Start: 2024-07-31 | End: 2024-07-31

## 2024-07-31 RX ORDER — INSULIN GLARGINE 100 [IU]/ML
6 INJECTION, SOLUTION SUBCUTANEOUS DAILY
Status: DISCONTINUED | OUTPATIENT
Start: 2024-07-31 | End: 2024-07-31 | Stop reason: HOSPADM

## 2024-07-31 RX ORDER — LORAZEPAM 1 MG/1
1 TABLET ORAL EVERY 8 HOURS PRN
Status: DISCONTINUED | OUTPATIENT
Start: 2024-07-31 | End: 2024-07-31 | Stop reason: HOSPADM

## 2024-07-31 RX ADMIN — INSULIN GLARGINE 6 UNITS: 100 INJECTION, SOLUTION SUBCUTANEOUS at 08:07

## 2024-07-31 RX ADMIN — LORAZEPAM 1 MG: 1 TABLET ORAL at 05:07

## 2024-07-31 RX ADMIN — IPRATROPIUM BROMIDE AND ALBUTEROL SULFATE 3 ML: .5; 3 SOLUTION RESPIRATORY (INHALATION) at 07:07

## 2024-07-31 RX ADMIN — LABETALOL HYDROCHLORIDE 10 MG: 5 INJECTION, SOLUTION INTRAVENOUS at 04:07

## 2024-07-31 RX ADMIN — ENOXAPARIN SODIUM 30 MG: 30 INJECTION SUBCUTANEOUS at 08:07

## 2024-07-31 RX ADMIN — PANTOPRAZOLE SODIUM 40 MG: 40 INJECTION, POWDER, FOR SOLUTION INTRAVENOUS at 08:07

## 2024-07-31 RX ADMIN — LOSARTAN POTASSIUM 50 MG: 50 TABLET, FILM COATED ORAL at 08:07

## 2024-07-31 RX ADMIN — IPRATROPIUM BROMIDE AND ALBUTEROL SULFATE 3 ML: .5; 3 SOLUTION RESPIRATORY (INHALATION) at 04:07

## 2024-07-31 RX ADMIN — VENLAFAXINE HYDROCHLORIDE 225 MG: 150 CAPSULE, EXTENDED RELEASE ORAL at 06:07

## 2024-07-31 RX ADMIN — INSULIN ASPART 6 UNITS: 100 INJECTION, SOLUTION INTRAVENOUS; SUBCUTANEOUS at 04:07

## 2024-07-31 RX ADMIN — LORAZEPAM 1 MG: 1 TABLET ORAL at 09:07

## 2024-07-31 RX ADMIN — POTASSIUM CHLORIDE 40 MEQ: 1500 TABLET, EXTENDED RELEASE ORAL at 08:07

## 2024-07-31 RX ADMIN — FUROSEMIDE 20 MG: 10 INJECTION, SOLUTION INTRAMUSCULAR; INTRAVENOUS at 08:07

## 2024-07-31 RX ADMIN — INSULIN ASPART 8 UNITS: 100 INJECTION, SOLUTION INTRAVENOUS; SUBCUTANEOUS at 01:07

## 2024-07-31 RX ADMIN — LORAZEPAM 1 MG: 1 TABLET ORAL at 12:07

## 2024-07-31 NOTE — PLAN OF CARE
Problem: Adult Inpatient Plan of Care  Goal: Plan of Care Review  7/31/2024 1747 by Alva Balbuena RN  Outcome: Progressing  7/31/2024 0733 by Alva Balbuena RN  Outcome: Progressing  Goal: Patient-Specific Goal (Individualized)  7/31/2024 1747 by Alva Balbuena RN  Outcome: Progressing  7/31/2024 0733 by Alva Balbuena RN  Outcome: Progressing  Goal: Absence of Hospital-Acquired Illness or Injury  7/31/2024 1747 by Alva Balbuena RN  Outcome: Progressing  7/31/2024 0733 by Alva Balbuena RN  Outcome: Progressing  Goal: Optimal Comfort and Wellbeing  7/31/2024 1747 by Alva Balbuena RN  Outcome: Progressing  7/31/2024 0733 by Alva Balbuena RN  Outcome: Progressing  Goal: Readiness for Transition of Care  7/31/2024 1747 by Alva Balbuena RN  Outcome: Progressing  7/31/2024 0733 by Alva Balbuena RN  Outcome: Progressing     Problem: Wound  Goal: Optimal Coping  7/31/2024 1747 by Alva Balbuena RN  Outcome: Progressing  7/31/2024 0733 by Alva Balbuena RN  Outcome: Progressing  Goal: Optimal Functional Ability  7/31/2024 1747 by Alva Balbuena RN  Outcome: Progressing  7/31/2024 0733 by Alva Balbuena RN  Outcome: Progressing  Goal: Absence of Infection Signs and Symptoms  7/31/2024 1747 by Alva Balbuena RN  Outcome: Progressing  7/31/2024 0733 by Alva Balbuena RN  Outcome: Progressing  Goal: Improved Oral Intake  7/31/2024 1747 by Alva Balbuena RN  Outcome: Progressing  7/31/2024 0733 by Alva Balbuena RN  Outcome: Progressing  Goal: Optimal Pain Control and Function  7/31/2024 1747 by Alva Balbuena RN  Outcome: Progressing  7/31/2024 0733 by Alva Balbuena RN  Outcome: Progressing  Goal: Skin Health and Integrity  7/31/2024 1747 by Alva Balbuena RN  Outcome: Progressing  7/31/2024 0733 by Alva Balbuena RN  Outcome: Progressing  Goal: Optimal Wound Healing  7/31/2024 1747 by Alva Balbuena RN  Outcome: Progressing  7/31/2024 0733 by  Alva Balbuena, RN  Outcome: Progressing     Problem: Fall Injury Risk  Goal: Absence of Fall and Fall-Related Injury  7/31/2024 1747 by Alva Balbuena RN  Outcome: Progressing  7/31/2024 0733 by Alva Balbuena RN  Outcome: Progressing     Problem: Skin Injury Risk Increased  Goal: Skin Health and Integrity  7/31/2024 1747 by Alva Balbuena RN  Outcome: Progressing  7/31/2024 0733 by Alva Balbuena RN  Outcome: Progressing

## 2024-07-31 NOTE — NURSING
Nurses Note -- 4 Eyes      7/31/2024   7:34 AM      Skin assessed during: Q Shift Change      [x] No new Altered Skin Integrity Present    []Prevention Measures Documented      [] Yes- Altered Skin Integrity Present or Discovered   [] LDA Added if Not in Epic (Describe Wound)   [] New Altered Skin Integrity was Present on Admit and Documented in LDA   [] Wound Image Taken    Wound Care Consulted? No    Attending Nurse:  Alva Balbuena RN    Second RN/Staff Member:   MILANA Monte RN

## 2024-07-31 NOTE — PROGRESS NOTES
[facility] - Oncology Acute  General Surgery  Progress Note    Subjective:       POD# 8 s/p open ventral hernia repair    AF, VS reviewed. Hypertensive. NAEON    Progressing well. Reports feeling better this morning. SOB/wheezing improved after lasix.     Nil n/v. Tolerating diabetic diet.     Reports having bowel movements and flatus.    Pain managed well with PRN injectables and PO tylenol.      Post-Op Info:  Procedure(s) (LRB):  REPAIR, HERNIA, VENTRAL (N/A)   8 Days Post-Op      Medications:  Continuous Infusions:      Scheduled Meds:   acetaminophen  1,000 mg Oral Q8H    albuterol-ipratropium  3 mL Nebulization Q8H    enoxaparin  30 mg Subcutaneous Daily    insulin glargine U-100  6 Units Subcutaneous Daily    losartan  50 mg Oral Daily    pantoprazole  40 mg Intravenous Daily    simethicone  1 tablet Oral TID    venlafaxine  225 mg Oral QAM     PRN Meds:  Current Facility-Administered Medications:     ceFAZolin (Ancef) IV (PEDS and ADULTS), 2 g, Intravenous, On Call Procedure    cloNIDine 0.1 mg/24 hr td ptwk, 1 patch, Transdermal, Once PRN    dextrose 10%, 12.5 g, Intravenous, PRN    dextrose 10%, 25 g, Intravenous, PRN    glucagon (human recombinant), 1 mg, Intramuscular, PRN    glucose, 16 g, Oral, PRN    glucose, 24 g, Oral, PRN    insulin aspart U-100, 0-10 Units, Subcutaneous, QID (AC + HS) PRN    ketorolac, 30 mg, Intravenous, Q6H PRN    labetalol, 10 mg, Intravenous, Q2H PRN    LORazepam, 1 mg, Oral, Q8H PRN    naloxone, 0.02 mg, Intravenous, PRN    ondansetron, 4 mg, Oral, Q4H PRN    ondansetron, 4 mg, Intravenous, Q4H PRN    prochlorperazine, 5 mg, Intravenous, Q4H PRN     Objective:     Vital Signs (Most Recent):  Temp: 97.7 °F (36.5 °C) (07/31/24 0728)  Pulse: 69 (07/31/24 0728)  Resp: 16 (07/31/24 0728)  BP: 138/84 (07/31/24 0834)  SpO2: 98 % (07/31/24 0728) Vital Signs (24h Range):  Temp:  [97.5 °F (36.4 °C)-98.4 °F (36.9 °C)] 97.7 °F (36.5 °C)  Pulse:  [69-80] 69  Resp:  [16-20]  "16  SpO2:  [97 %-100 %] 98 %  BP: (138-168)/(55-99) 138/84       Intake/Output Summary (Last 24 hours) at 7/31/2024 0845  Last data filed at 7/31/2024 0649  Gross per 24 hour   Intake 480 ml   Output 700 ml   Net -220 ml         Physical Exam  Vitals reviewed.   Constitutional:       General: She is not in acute distress.     Appearance: Normal appearance. She is not ill-appearing.   Cardiovascular:      Rate and Rhythm: Normal rate and regular rhythm.   Pulmonary:      Effort: Pulmonary effort is normal. No respiratory distress.      Breath sounds: Rales present. No wheezing.   Abdominal:      General: Abdomen is flat. Bowel sounds are normal. There is no distension.      Palpations: Abdomen is soft.      Tenderness: There is no abdominal tenderness. There is no guarding or rebound.      Hernia: No hernia is present.      Comments: Incision clean, draining serosanguinous fluid. Staple line intact.    Musculoskeletal:      Right lower leg: Edema present.      Left lower leg: Edema present.   Skin:     General: Skin is warm and dry.   Neurological:      Mental Status: She is alert.   Psychiatric:         Attention and Perception: Attention normal.         Mood and Affect: Mood is not anxious.         Behavior: Behavior is not agitated.         Thought Content: Thought content normal.         Significant Labs:  CBC:   No results for input(s): "WBC", "RBC", "HGB", "HCT", "PLT", "MCV", "MCH", "MCHC" in the last 48 hours.      CMP:   Recent Labs   Lab 07/31/24  0400   CALCIUM 8.2*      K 3.4*   CO2 33*   CL 99   BUN 14.2   CREATININE 0.77       Significant Diagnostics:      Assessment/Plan:     There are no hospital problems to display for this patient.    POD#8  Progressing well.     -post operative ileus  - XR KUB with non specific gas pattern  - scheduled simethicone  - advised to use chewing gum to promote bowel activity   - Mg, Phos normal   - NGT discontinued   - tolerating diabetic diet, having BMs & " flatus  -SOB, wheezing   -improving   -nebulizer treatment q8h   -CPAP QHS at 8mmHg  -repeat CXR yesterday showed pulmonary congestion, 20mg lasix given with improvement of SOB  -CXR this morning shows some improvement   -IV lasix 20mg this morning  - IV meperidine and IV Toradol ordered PRN for pain   - benzodiazapine withdrawals   - resolved, now on home dose ativan  - ativan 1mg q8h PO  - can take home dose venlafaxine  - withdrawal symptoms improving  - Hyperglycemia   - moderate dose insulin sliding scale and glucose checks   - 6 units lantus qd   -Hypertension   -losartan 50mg qd  - ambulate  - incentive spirometry  - IV fluids      João Gaffney MD  General Surgery, PGY-1  [facility]

## 2024-07-31 NOTE — DISCHARGE INSTRUCTIONS
Discharge home   Activity - no heavy lifting >20lbs x 4 weeks   Wounds - may shower in 48hr. NO tub baths or soaking. After gently cleansing with soap and water, pat dry. Change dressings daily. We will remove your staples at your post-op visit.    Diet - resume   Follow up in 2 weeks

## 2024-07-31 NOTE — NURSING
Nurses Note -- 4 Eyes      7/30/2024   11:14 PM      Skin assessed during: Q Shift Change      [x] No Altered Skin Integrity Present    []Prevention Measures Documented      [] Yes- Altered Skin Integrity Present or Discovered   [] LDA Added if Not in Epic (Describe Wound)   [] New Altered Skin Integrity was Present on Admit and Documented in LDA   [] Wound Image Taken    Wound Care Consulted? No    Attending Nurse:  Trudy Monte RN    Second RN/Staff Member:  Alva Balbuena RN

## 2024-07-31 NOTE — DISCHARGE SUMMARY
DISCHARGE DATE: 7/31/2024           ADMISSION DIAGNOSIS: Incarcerated ventral hernia           DISCHARGE DIAGNOSIS:  Incarcerated ventral hernia            OPERATION:  Procedure(s) (LRB):  REPAIR, HERNIA, VENTRAL (N/A)              HOSPITAL COURSE:  The patient is a 64 year old female who underwent a open ventral hernia repair for an incarcerated ventral hernia. On POD1 patient was doing well on PCA pump for pain and NPO diet. On POD2 she became hypertensive and began having increased abdominal pain and distension with frequent episodes of non-bloody emesis resistant to Zofran and metoclopramide, no bowel movements or flatus, concerning for post-op ileus. An NGT was placed. 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 Ativan was began with improvement of withdrawal symptoms. On POD4, NGT still had copious drainage, no bowel movements. On POD5, she began having flatus and a bowel movement. A clamp trial was performed with minimal residual and the NGT was discontinued. She was begun on a clear liquids diet. On POD6 she was tolerating a clear liquids diet and was progressed to soft diet. She began having SOB/wheezing overnight, her CXR showed no acute pathology and was treated with nebulizer and oxygen with improvement. On POD7 she continued to have oxygen requirements and was scheduled nebulizer treatment with some improvement. A repeat CXR was performed showing pulmonary congestion for which lasix was prescribed with considerable improvement. On POD 8 she was given another dose of lasix, no longer requiring oxygen and was tolerating a diabetic diet and was subsequently discharged in stable condition.       DISPOSITION: Home          DISCHARGE MEDICATIONS:  The patient may resume her home medications. The patient will  likely need changes to her home insulin regimen based off of her new diet. Her regimen in hospital was  6 units insulin glargine (long-acting) daily and 6 units insulin aspart pre-prandial(before meals).          DISCHARGE INSTRUCTIONS:  Diet , diabetic.  Activity, no heavy lifting greater than 20 lbs for six weeks.  Wounds, patient may shower, no tub baths for two weeks.  Patient is to follow up with Dr. Yao Singletary in one week. Patient is to call their endocrinologist in the morning to discuss changes to their insulin regimen as they advance their diet. Patient is to make an appointment with their primary care physician to evaluate their hypertension medications.

## 2024-08-04 NOTE — OP NOTE
Patient:  Beth Chaudhary      :  1960        DATE OF SURGERY:    2024          SURGEON:  Yao Singletary MD       ASSISTANT:  Lynne Lama NP  (First Assistant)      PREOPERATIVE DIAGNOSIS:  Incarcerated Recurrent Ventral hernia.           POSTOPERATIVE DIAGNOSIS:  Incarcerated Recurrent Ventral hernia.           PROCEDURE:  Open Primary ventral hernia repair - 12 cm in greatest dimension.           ANESTHESIA:  General endotracheal anesthesia.           ESTIMATED BLOOD LOSS:  Minimal.           BLOOD ADMINISTERED:  None.           INSTRUMENT COUNT:  Lap and instrument counts are correct x2 at the end of the case.           INDICATIONS:  The patient is a 64 y.o.-year-old female, who presented with complaints of a recurrent ventral hernia.  The patient appeared to have a recurrent ventral hernia in the lower midline.  Risks and benefits of surgery were discussed with the patient.  The patient voiced understanding of risks and benefits and elected to proceed with surgery.           PROCEDURE IN DETAIL:  Once informed consents were obtained, the patient was taken to the operating room and placed in supine position on the operating table.  After general endotracheal anesthesia was induced, the abdomen was prepped and draped in the usual sterile fashion.  An incision was made over the defect and was sharply dissected to the hernia sac.  The sac was opened and incarcerated colon was visualized. The adhesions to the colon was sharply dissected and freed from the abdominal fascia.  The adhesions to the surrounding fascia were intense due to all the prior previous surgical manipulation.  Once all the edges were cleaned and freed, the defect was approximately 12 cm in greatest dimension.  The fascia was easily re-approximated with minimal tension.  The fascia was then closed with 1-0 prolene sutures in an interrupted figure of eight fashion.  The wound was irrigated and dried.  The skin was then closed with a  deep layer of 3-0 vicryl suture followed by staples.  Skin was clean and dry. A dry, sterile pressure dressing was placed in the wound. Lynne Lama was present during the entirety of the case and was critical in retraction for proper dissection. The patient was transported to the recovery room in good condition.

## 2024-08-05 ENCOUNTER — TELEPHONE (OUTPATIENT)
Dept: SURGERY | Facility: CLINIC | Age: 64
End: 2024-08-05
Payer: COMMERCIAL

## 2024-08-07 ENCOUNTER — TELEPHONE (OUTPATIENT)
Dept: SURGERY | Facility: CLINIC | Age: 64
End: 2024-08-07

## 2024-08-07 ENCOUNTER — OFFICE VISIT (OUTPATIENT)
Dept: SURGERY | Facility: CLINIC | Age: 64
End: 2024-08-07
Payer: COMMERCIAL

## 2024-08-07 VITALS
HEIGHT: 67 IN | SYSTOLIC BLOOD PRESSURE: 150 MMHG | BODY MASS INDEX: 28.96 KG/M2 | HEART RATE: 80 BPM | DIASTOLIC BLOOD PRESSURE: 88 MMHG | WEIGHT: 184.5 LBS

## 2024-08-07 DIAGNOSIS — Z98.890 POST-OPERATIVE STATE: Primary | ICD-10-CM

## 2024-08-07 PROCEDURE — 1159F MED LIST DOCD IN RCRD: CPT | Mod: CPTII,,,

## 2024-08-07 PROCEDURE — 1160F RVW MEDS BY RX/DR IN RCRD: CPT | Mod: CPTII,,,

## 2024-08-07 PROCEDURE — 3077F SYST BP >= 140 MM HG: CPT | Mod: CPTII,,,

## 2024-08-07 PROCEDURE — 4010F ACE/ARB THERAPY RXD/TAKEN: CPT | Mod: CPTII,,,

## 2024-08-07 PROCEDURE — 3079F DIAST BP 80-89 MM HG: CPT | Mod: CPTII,,,

## 2024-08-07 PROCEDURE — 99024 POSTOP FOLLOW-UP VISIT: CPT | Mod: ,,,

## 2024-08-12 ENCOUNTER — TELEPHONE (OUTPATIENT)
Dept: SURGERY | Facility: CLINIC | Age: 64
End: 2024-08-12
Payer: COMMERCIAL

## 2024-12-30 NOTE — PROGRESS NOTES
HISTORY & PHYSICAL  General Surgery    Patient Name: Beth Chaudhary  YOB: 1960    Date: 12/29/2024                     SUBJECTIVE:     Chief Complaint/Reason for Admission: Follow-up (1 month fu - ventral hernia)       History of Present Illness:  Ms. Beth Chaudhary is a 64 y.o. female with a history of multiple abdominal surgeries and reports she noticed an increasing abdominal bulge that has been increasing for quite some time. She states bulge has become larger over time and is now causing some discomfort. She denies changes in bladder or bowel habits or nausea/vomiting. She denies chest pain, shortness of breath, fever or chills.     Review of Systems:  12 point ROS negative except as stated in HPI    PAST HISTORY:     Past Medical History:   Diagnosis Date    Abdominal pain     Arthritis     Asthma     Back pain     Breast cancer, stage 1     lumpectomy    Breast pain     Chronic back pain     Claustrophobia     Constipation     CPAP (continuous positive airway pressure) dependence     Depression     Diabetes mellitus type I     Disorder of muscle     Dyspnea     Encounter for blood transfusion     Fibromyalgia     GERD (gastroesophageal reflux disease)     Hx of urinary tract infection     Hypercholesterolemia     Hyperlipidemia     Hypertension     Hypotension     IBS (irritable bowel syndrome)     Incarcerated ventral hernia     Memory loss     Muscular disorder     Necrotizing fasciitis     Osteoporosis     Panic attack     Personal history of colonic polyps 12/21/2018    Sleep apnea, unspecified     SOB (shortness of breath) on exertion     Urinary frequency     Ventral hernia     Weakness      Past Surgical History:   Procedure Laterality Date    APPENDECTOMY      CHOLECYSTECTOMY      COLONOSCOPY N/A 12/21/2018    FACIAL RECONSTRUCTION SURGERY      LUMPECTOMY, BREAST Left     necrotizing fasciitis      REPAIR, HERNIA, VENTRAL N/A 7/23/2024    Procedure: REPAIR, HERNIA, VENTRAL;  Surgeon:  Yao Singletary Jr., MD;  Location: [facility];  Service: General;  Laterality: N/A;    TONSILLECTOMY      VENTRAL HERNIA REPAIR  03/03/2020     Family History   Problem Relation Name Age of Onset    Heart disease Mother      Cancer Father      Thyroid disease Sister      No Known Problems Brother      No Known Problems Daughter      Diabetes Maternal Grandmother      Diabetes Maternal Grandfather      Diabetes Paternal Grandmother      Diabetes Paternal Grandfather       Social History     Socioeconomic History    Marital status:    Tobacco Use    Smoking status: Former     Passive exposure: Never    Smokeless tobacco: Never   Substance and Sexual Activity    Alcohol use: Not Currently    Drug use: Never    Sexual activity: Not Currently     Social Drivers of Health     Financial Resource Strain: Low Risk  (7/24/2024)    Overall Financial Resource Strain (CARDIA)     Difficulty of Paying Living Expenses: Not hard at all   Recent Concern: Financial Resource Strain - Medium Risk (6/11/2024)    Received from [employer] [facility] and Its Subsidiaries and Affiliates    Overall Financial Resource Strain (CARDIA)     Difficulty of Paying Living Expenses: Somewhat hard   Food Insecurity: No Food Insecurity (7/24/2024)    Hunger Vital Sign     Worried About Running Out of Food in the Last Year: Never true     Ran Out of Food in the Last Year: Never true   Transportation Needs: Unmet Transportation Needs (6/11/2024)    Received from [employer] [facility] and Its Subsidiaries and Affiliates    PRAPARE - Transportation     Lack of Transportation (Medical): Yes     Lack of Transportation (Non-Medical): Yes   Physical Activity: Inactive (7/24/2024)    Exercise Vital Sign     Days of Exercise per Week: 0 days     Minutes of Exercise per Session: 0 min   Stress: No Stress Concern Present (7/24/2024)    [place] [employer] - Occupational Stress  Questionnaire     Feeling of Stress : Only a little   Recent Concern: Stress - Stress Concern Present (2024)    Received from [facility] and Its Subsidiaries and Affiliates    [place] [employer]ational [employer] - Occupational Stress Questionnaire     Feeling of Stress : Very much   Housing Stability: Low Risk  (2024)    Housing Stability Vital Sign     Unable to Pay for Housing in the Last Year: No     Homeless in the Last Year: No       MEDICATIONS & ALLERGIES:     Current Outpatient Medications on File Prior to Visit   Medication Sig    albuterol (PROVENTIL/VENTOLIN HFA) 90 mcg/actuation inhaler Inhale 2 puffs into the lungs every 6 (six) hours as needed.    alpha lipoic acid 300 mg Cap Take 1 capsule by mouth every morning.    cholecalciferol, vitamin D3, (VITAMIN D3) 25 mcg (1,000 unit) capsule Take 2,500 Units by mouth once daily.    clotrimazole-betamethasone 1-0.05% (LOTRISONE) cream SMARTSIG:sparingly Topical Twice Daily PRN    DEXCOM G7 SENSOR Rowena SMARTSI Topical Every 10 Days    FIASP FLEXTOUCH U-100 INSULIN 100 unit/mL (3 mL) InPn SMARTSIG:10 Unit(s) SUB-Q 3 Times Daily    fluticasone propionate (FLONASE) 50 mcg/actuation nasal spray 1 spray by Each Nostril route as needed.    hydrOXYzine HCL (ATARAX) 25 MG tablet Take 25 mg by mouth as needed.    insulin NPH-insulin regular, 70/30, 100 unit/mL (70-30) injection Inject into the skin 2 (two) times daily.    LORazepam (ATIVAN) 1 MG tablet Take 1 mg by mouth 3 (three) times daily.    metoprolol succinate (TOPROL-XL) 50 MG 24 hr tablet Take 50 mg by mouth once daily.    mupirocin (BACTROBAN) 2 % ointment Apply topically 3 (three) times daily.    nitrofurantoin, macrocrystal-monohydrate, (MACROBID) 100 MG capsule Take 100 mg by mouth.    olmesartan (BENICAR) 20 MG tablet Take 20 mg by mouth once daily.    pravastatin (PRAVACHOL) 80 MG tablet Take 80 mg by mouth once daily.    TOUJEO SOLOSTAR U-300  "INSULIN 300 unit/mL (1.5 mL) InPn pen SMARTSI Unit(s) SUB-Q Daily    venlafaxine 225 mg TR24 Take 1 tablet by mouth every morning.     No current facility-administered medications on file prior to visit.     Review of patient's allergies indicates:   Allergen Reactions    Gabapentin      Other reaction(s): Hives    Morphine      Other reaction(s): morphine  Other reaction(s): morphine      Sulfa (sulfonamide antibiotics)     Codeine Rash         OBJECTIVE:   Visit Vitals  BP (!) 92/57 (BP Location: Left arm, Patient Position: Sitting)   Pulse 80   Ht 5' 7" (1.702 m)   Wt 86.4 kg (190 lb 6.4 oz)   BMI 29.82 kg/m²       Physical Exam:  General:  Well developed, well nourished, no acute distress  HEENT:  Normocephalic, atraumatic, PERRL, EOMI, clear sclera, ears normal, neck supple, throat clear without erythema or exudates  CVS:  RRR, S1 and S2 normal, no murmurs, rubs, gallops  Resp:  Lungs clear to auscultation, no wheezes, rales, rhonchi, cough  GI:  Abdomen soft, non-tender, non-distended, normoactive bowel sounds, no masses.   + reducible ventral hernia, NTTP  :  Deferred  MSK:  No muscle atrophy, cyanosis, peripheral edema, full range of motion  Skin:  No rashes, ulcers, erythema  Neuro:  CNII-XII grossly intact  Psych:  Alert and oriented to person, place, and time    Results:  I have independently reviewed all pertinent lab (CBC, CMP) and radiologic studies (CT) relevant to general/bariatric surgery.    VISIT DIAGNOSES:       ICD-10-CM ICD-9-CM   1. Ventral hernia without obstruction or gangrene  K43.9 553.20        ASSESSMENT/PLAN:     64 yo female with small Ventral Hernia    -  Discussed with the patient that I would like her to lose some weight prior to surgical intervtion.  Will refer her to dietary for counseling and f/u after  -  Plan for Open Ventral Hernia Repair early next year          "

## 2025-01-13 ENCOUNTER — TELEPHONE (OUTPATIENT)
Dept: SURGERY | Facility: CLINIC | Age: 65
End: 2025-01-13
Payer: COMMERCIAL

## 2025-01-24 NOTE — PROGRESS NOTES
Post Operative Progress Note  General Surgery    Patient Name: Beth Chaudhary  YOB: 1960    Date: 02/03/2025                   SUBJECTIVE:     Chief Complaint/Reason for Admission:   Chief Complaint   Patient presents with    Follow-up     concerned about incision not healing properly from 7/23/24 Open Ventral Hernia Repair     Recheck incisions Open Ventral Hernia Repair 7/23/24     History of Present Illness:  Ms. Beth Chaudhary is a 64 y.o. female s/p ventral hernia repair. female is with concerns that incision is not healing properly. Tolerating a regular diet without changes to bowel / bladder habits. Denies fever / chills.    Review of Systems:  12 point ROS negative except as stated in HPI    PAST HISTORY:     Past Medical History:   Diagnosis Date    Abdominal pain     Arthritis     Asthma     Back pain     Breast cancer, stage 1     lumpectomy    Breast pain     Chronic back pain     Claustrophobia     Constipation     CPAP (continuous positive airway pressure) dependence     Depression     Diabetes mellitus type I     Disorder of muscle     Dyspnea     Encounter for blood transfusion     Fibromyalgia     GERD (gastroesophageal reflux disease)     Hx of urinary tract infection     Hypercholesterolemia     Hyperlipidemia     Hypertension     Hypotension     IBS (irritable bowel syndrome)     Incarcerated ventral hernia     Memory loss     Muscular disorder     Necrotizing fasciitis     Osteoporosis     Panic attack     Personal history of colonic polyps 12/21/2018    Sleep apnea, unspecified     SOB (shortness of breath) on exertion     Urinary frequency     Ventral hernia     Weakness      Past Surgical History:   Procedure Laterality Date    APPENDECTOMY      CHOLECYSTECTOMY      COLONOSCOPY N/A 12/21/2018    FACIAL RECONSTRUCTION SURGERY      LUMPECTOMY, BREAST Left     necrotizing fasciitis      REPAIR, HERNIA, VENTRAL N/A 7/23/2024    Procedure: REPAIR, HERNIA, VENTRAL;  Surgeon: Hayder  Yao RODRIGUES Jr., MD;  [facility];  Service: General;  Laterality: N/A;    TONSILLECTOMY      VENTRAL HERNIA REPAIR  03/03/2020     Family History   Problem Relation Name Age of Onset    Heart disease Mother      Cancer Father      Thyroid disease Sister      No Known Problems Brother      No Known Problems Daughter      Diabetes Maternal Grandmother      Diabetes Maternal Grandfather      Diabetes Paternal Grandmother      Diabetes Paternal Grandfather       Social History     Socioeconomic History    Marital status:    Tobacco Use    Smoking status: Former     Passive exposure: Never    Smokeless tobacco: Never   Substance and Sexual Activity    Alcohol use: Not Currently    Drug use: Never    Sexual activity: Not Currently     Social Drivers of Health     Financial Resource Strain: Low Risk  (7/24/2024)    Overall Financial Resource Strain (CARDIA)     Difficulty of Paying Living Expenses: Not hard at all   Recent Concern: Financial Resource Strain - Medium Risk (6/11/2024)    Received from [employer] [facility] and Its Subsidiaries and Affiliates    Overall Financial Resource Strain (CARDIA)     Difficulty of Paying Living Expenses: Somewhat hard   Food Insecurity: No Food Insecurity (7/24/2024)    Hunger Vital Sign     Worried About Running Out of Food in the Last Year: Never true     Ran Out of Food in the Last Year: Never true   Transportation Needs: Unmet Transportation Needs (6/11/2024)    Received from [employer] [facility] and Its Subsidiaries and Affiliates    PRAPARE - Transportation     Lack of Transportation (Medical): Yes     Lack of Transportation (Non-Medical): Yes   Physical Activity: Inactive (7/24/2024)    Exercise Vital Sign     Days of Exercise per Week: 0 days     Minutes of Exercise per Session: 0 min   Stress: No Stress Concern Present (7/24/2024)    Citizen of [place] of Occupational Health - Occupational Stress Questionnaire      Feeling of Stress : Only a little   Recent Concern: Stress - Stress Concern Present (2024)    Received from [facility] and Its Subsidiaries and Affiliates    [place] of Occupational Health - Occupational Stress Questionnaire     Feeling of Stress : Very much   Housing Stability: Low Risk  (2024)    Housing Stability Vital Sign     Unable to Pay for Housing in the Last Year: No     Homeless in the Last Year: No       MEDICATIONS & ALLERGIES:     Current Outpatient Medications on File Prior to Visit   Medication Sig    albuterol (PROVENTIL/VENTOLIN HFA) 90 mcg/actuation inhaler Inhale 2 puffs into the lungs every 6 (six) hours as needed.    alpha lipoic acid 300 mg Cap Take 1 capsule by mouth every morning.    BASAGLAR KWIKPEN U-100 INSULIN 100 unit/mL (3 mL) InPn pen Inject 43 Units into the skin every evening.    cholecalciferol, vitamin D3, (VITAMIN D3) 25 mcg (1,000 unit) capsule Take 2,500 Units by mouth once daily.    clotrimazole-betamethasone 1-0.05% (LOTRISONE) cream SMARTSIG:sparingly Topical Twice Daily PRN    DEXCOM G7 SENSOR Rowena SMARTSI Topical Every 10 Days    FIASP FLEXTOUCH U-100 INSULIN 100 unit/mL (3 mL) InPn SMARTSIG:10 Unit(s) SUB-Q 3 Times Daily    fluticasone propionate (FLONASE) 50 mcg/actuation nasal spray 1 spray by Each Nostril route as needed.    hydrOXYzine HCL (ATARAX) 25 MG tablet Take 25 mg by mouth as needed.    insulin NPH-insulin regular, 70/30, 100 unit/mL (70-30) injection Inject into the skin 2 (two) times daily.    LORazepam (ATIVAN) 1 MG tablet Take 1 mg by mouth 3 (three) times daily.    LYUMJEV KWIKPEN U-100 INSULIN 100 unit/mL pen Inject 10 Units into the skin after meals and at bedtime as needed.    metoprolol succinate (TOPROL-XL) 50 MG 24 hr tablet Take 50 mg by mouth once daily.    mupirocin (BACTROBAN) 2 % ointment Apply topically 3 (three) times daily.    nitrofurantoin, macrocrystal-monohydrate,  (MACROBID) 100 MG capsule Take 100 mg by mouth.    olmesartan (BENICAR) 20 MG tablet Take 20 mg by mouth once daily.    pravastatin (PRAVACHOL) 80 MG tablet Take 80 mg by mouth once daily.    TOUJEO SOLOSTAR U-300 INSULIN 300 unit/mL (1.5 mL) InPn pen SMARTSI Unit(s) SUB-Q Daily    venlafaxine 225 mg TR24 Take 1 tablet by mouth every morning.     No current facility-administered medications on file prior to visit.     Review of patient's allergies indicates:   Allergen Reactions    Gabapentin      Other reaction(s): Hives    Morphine      Other reaction(s): morphine  Other reaction(s): morphine      Sulfa (sulfonamide antibiotics)     Codeine Rash       OBJECTIVE:     There were no vitals filed for this visit.    There is no height or weight on file to calculate BMI.    Physical Exam:  General:  Well developed, well nourished, no acute distress  HEENT:  Normocephalic, atraumatic, PERRL, EOMI, clear sclera, ears normal, neck supple, throat clear without erythema or exudates  CVS:  RRR, S1 and S2 normal, no murmurs, rubs, gallops  Resp:  Lungs clear to auscultation, no wheezes, rales, rhonchi, cough  GI:  Abdomen soft, non-tender, non-distended, normoactive bowel sounds, no masses  :  Deferred  MSK:  No muscle atrophy, cyanosis, peripheral edema, full range of motion  Skin:  No rashes, ulcers, erythema. Incision c/d/I, small area in the midline with chronic scarring  Neuro:  CNII-XII grossly intact  Psych:  Alert and oriented to person, place, and time        VISIT DIAGNOSES:       ICD-10-CM ICD-9-CM   1. Open wound of abdominal wall, initial encounter  S31.109A 879.2       ASSESSMENT/PLAN:     Ms. Beth Chaudhary is a 64 y.o. female s/p primary ventral hernia repair    -  It appears that she is currently picking/scratching the wound - impeding wound healing  - discussed with her the need to discontinue scratching the wound - she voiced understanding  - doing well      RTC PRN

## 2025-01-29 ENCOUNTER — OFFICE VISIT (OUTPATIENT)
Dept: SURGERY | Facility: CLINIC | Age: 65
End: 2025-01-29
Payer: COMMERCIAL

## 2025-01-29 DIAGNOSIS — S31.109A OPEN WOUND OF ABDOMINAL WALL, INITIAL ENCOUNTER: Primary | ICD-10-CM

## 2025-01-29 PROCEDURE — 1160F RVW MEDS BY RX/DR IN RCRD: CPT | Mod: CPTII,,, | Performed by: SURGERY

## 2025-01-29 PROCEDURE — 4010F ACE/ARB THERAPY RXD/TAKEN: CPT | Mod: CPTII,,, | Performed by: SURGERY

## 2025-01-29 PROCEDURE — 99213 OFFICE O/P EST LOW 20 MIN: CPT | Mod: ,,, | Performed by: SURGERY

## 2025-01-29 PROCEDURE — 1159F MED LIST DOCD IN RCRD: CPT | Mod: CPTII,,, | Performed by: SURGERY

## 2025-02-24 ENCOUNTER — LAB VISIT (OUTPATIENT)
Dept: LAB | Facility: HOSPITAL | Age: 65
End: 2025-02-24
Attending: INTERNAL MEDICINE
Payer: COMMERCIAL

## 2025-02-24 DIAGNOSIS — E78.5 HYPERLIPIDEMIA, UNSPECIFIED HYPERLIPIDEMIA TYPE: ICD-10-CM

## 2025-02-24 DIAGNOSIS — Z11.59 SCREENING EXAMINATION FOR POLIOMYELITIS: ICD-10-CM

## 2025-02-24 DIAGNOSIS — R53.83 FATIGUE, UNSPECIFIED TYPE: ICD-10-CM

## 2025-02-24 DIAGNOSIS — E10.65 TYPE I DIABETES MELLITUS WITH HYPEROSMOLAR COMA: ICD-10-CM

## 2025-02-24 DIAGNOSIS — E87.0 TYPE I DIABETES MELLITUS WITH HYPEROSMOLAR COMA: ICD-10-CM

## 2025-02-24 DIAGNOSIS — E55.9 AVITAMINOSIS D: Primary | ICD-10-CM

## 2025-02-24 DIAGNOSIS — E10.69 TYPE I DIABETES MELLITUS WITH HYPEROSMOLAR COMA: ICD-10-CM

## 2025-02-24 LAB
25(OH)D3+25(OH)D2 SERPL-MCNC: 53 NG/ML (ref 30–80)
ALBUMIN SERPL-MCNC: 3.6 G/DL (ref 3.4–4.8)
ALBUMIN/GLOB SERPL: 1.2 RATIO (ref 1.1–2)
ALP SERPL-CCNC: 75 UNIT/L (ref 40–150)
ALT SERPL-CCNC: 13 UNIT/L (ref 0–55)
ANION GAP SERPL CALC-SCNC: 6 MEQ/L
AST SERPL-CCNC: 16 UNIT/L (ref 5–34)
BASOPHILS # BLD AUTO: 0.07 X10(3)/MCL
BASOPHILS NFR BLD AUTO: 0.8 %
BILIRUB SERPL-MCNC: 0.5 MG/DL
BUN SERPL-MCNC: 28.3 MG/DL (ref 9.8–20.1)
CALCIUM SERPL-MCNC: 9.9 MG/DL (ref 8.4–10.2)
CHLORIDE SERPL-SCNC: 104 MMOL/L (ref 98–107)
CHOLEST SERPL-MCNC: 199 MG/DL
CHOLEST/HDLC SERPL: 5 {RATIO} (ref 0–5)
CO2 SERPL-SCNC: 30 MMOL/L (ref 23–31)
CREAT SERPL-MCNC: 1.32 MG/DL (ref 0.55–1.02)
CREAT/UREA NIT SERPL: 21
EOSINOPHIL # BLD AUTO: 0.36 X10(3)/MCL (ref 0–0.9)
EOSINOPHIL NFR BLD AUTO: 4.1 %
ERYTHROCYTE [DISTWIDTH] IN BLOOD BY AUTOMATED COUNT: 12.3 % (ref 11.5–17)
GFR SERPLBLD CREATININE-BSD FMLA CKD-EPI: 45 ML/MIN/1.73/M2
GLOBULIN SER-MCNC: 3 GM/DL (ref 2.4–3.5)
GLUCOSE SERPL-MCNC: 26 MG/DL (ref 82–115)
HCT VFR BLD AUTO: 41.7 % (ref 37–47)
HDLC SERPL-MCNC: 44 MG/DL (ref 35–60)
HGB BLD-MCNC: 13.5 G/DL (ref 12–16)
IMM GRANULOCYTES # BLD AUTO: 0.03 X10(3)/MCL (ref 0–0.04)
IMM GRANULOCYTES NFR BLD AUTO: 0.3 %
LDLC SERPL CALC-MCNC: 123 MG/DL (ref 50–140)
LYMPHOCYTES # BLD AUTO: 4.05 X10(3)/MCL (ref 0.6–4.6)
LYMPHOCYTES NFR BLD AUTO: 46.3 %
MCH RBC QN AUTO: 29.9 PG (ref 27–31)
MCHC RBC AUTO-ENTMCNC: 32.4 G/DL (ref 33–36)
MCV RBC AUTO: 92.3 FL (ref 80–94)
MONOCYTES # BLD AUTO: 0.69 X10(3)/MCL (ref 0.1–1.3)
MONOCYTES NFR BLD AUTO: 7.9 %
NEUTROPHILS # BLD AUTO: 3.54 X10(3)/MCL (ref 2.1–9.2)
NEUTROPHILS NFR BLD AUTO: 40.6 %
NRBC BLD AUTO-RTO: 0 %
PLATELET # BLD AUTO: 287 X10(3)/MCL (ref 130–400)
PMV BLD AUTO: 9 FL (ref 7.4–10.4)
POTASSIUM SERPL-SCNC: 4.7 MMOL/L (ref 3.5–5.1)
PROT SERPL-MCNC: 6.6 GM/DL (ref 5.8–7.6)
RBC # BLD AUTO: 4.52 X10(6)/MCL (ref 4.2–5.4)
SODIUM SERPL-SCNC: 140 MMOL/L (ref 136–145)
T4 FREE SERPL-MCNC: 1 NG/DL (ref 0.7–1.48)
TRIGL SERPL-MCNC: 162 MG/DL (ref 37–140)
TSH SERPL-ACNC: 0.74 UIU/ML (ref 0.35–4.94)
VLDLC SERPL CALC-MCNC: 32 MG/DL
WBC # BLD AUTO: 8.74 X10(3)/MCL (ref 4.5–11.5)

## 2025-02-24 PROCEDURE — 84439 ASSAY OF FREE THYROXINE: CPT

## 2025-02-24 PROCEDURE — 85025 COMPLETE CBC W/AUTO DIFF WBC: CPT

## 2025-02-24 PROCEDURE — 84443 ASSAY THYROID STIM HORMONE: CPT

## 2025-02-24 PROCEDURE — 36415 COLL VENOUS BLD VENIPUNCTURE: CPT

## 2025-02-24 PROCEDURE — 80053 COMPREHEN METABOLIC PANEL: CPT

## 2025-02-24 PROCEDURE — 82306 VITAMIN D 25 HYDROXY: CPT

## 2025-02-24 PROCEDURE — 80061 LIPID PANEL: CPT

## 2025-02-26 LAB — HCV RNA SERPL NAA+PROBE-ACNC: NORMAL IU/ML

## (undated) DEVICE — GLOVE PROTEXIS LTX MICRO  7.5

## (undated) DEVICE — SUT VICRYL 1 OB 36 CTX

## (undated) DEVICE — SUT PROLENE 1 CTX 30IN BLUE

## (undated) DEVICE — TAPE MEDIPORE 4IN X 2YDS

## (undated) DEVICE — GLOVE PROTEXIS LTX MICRO 6.5

## (undated) DEVICE — APPLICATOR CHLORAPREP ORN 26ML

## (undated) DEVICE — ELECTRODE PATIENT RETURN DISP

## (undated) DEVICE — DRAPE FLUID WARMER 44X44IN

## (undated) DEVICE — TRAY CATH FOL SIL URIMTR 16FR

## (undated) DEVICE — SOL NACL IRR 1000ML BTL

## (undated) DEVICE — Device

## (undated) DEVICE — STRIP MEDI WND CLSR 1/2X4IN

## (undated) DEVICE — SUT VICRYL 3-0 27 SH

## (undated) DEVICE — BINDER ABD 12IN SM/MED 30-45IN

## (undated) DEVICE — NDL HYPO 22GX1 1/2 SYR 10ML LL

## (undated) DEVICE — SUT VICRYL PLUS 3-0 SH 18IN